# Patient Record
Sex: FEMALE | Race: BLACK OR AFRICAN AMERICAN | Employment: OTHER | URBAN - METROPOLITAN AREA
[De-identification: names, ages, dates, MRNs, and addresses within clinical notes are randomized per-mention and may not be internally consistent; named-entity substitution may affect disease eponyms.]

---

## 2013-04-17 LAB
COLONOSCOPY, EXTERNAL: NORMAL
COLONOSCOPY, EXTERNAL: NORMAL

## 2017-07-10 ENCOUNTER — OFFICE VISIT (OUTPATIENT)
Dept: FAMILY MEDICINE CLINIC | Age: 52
End: 2017-07-10

## 2017-07-10 ENCOUNTER — HOSPITAL ENCOUNTER (OUTPATIENT)
Dept: LAB | Age: 52
Discharge: HOME | End: 2017-07-10
Payer: OTHER GOVERNMENT

## 2017-07-10 VITALS
BODY MASS INDEX: 31.76 KG/M2 | TEMPERATURE: 98.2 F | HEIGHT: 62 IN | HEART RATE: 84 BPM | OXYGEN SATURATION: 97 % | RESPIRATION RATE: 20 BRPM | WEIGHT: 172.6 LBS | DIASTOLIC BLOOD PRESSURE: 110 MMHG | SYSTOLIC BLOOD PRESSURE: 180 MMHG

## 2017-07-10 DIAGNOSIS — I10 ESSENTIAL HYPERTENSION: ICD-10-CM

## 2017-07-10 DIAGNOSIS — K64.9 HEMORRHOIDS, UNSPECIFIED HEMORRHOID TYPE: Primary | ICD-10-CM

## 2017-07-10 DIAGNOSIS — G43.009 NONINTRACTABLE MIGRAINE, UNSPECIFIED MIGRAINE TYPE: ICD-10-CM

## 2017-07-10 DIAGNOSIS — J45.20 MILD INTERMITTENT ASTHMA WITHOUT COMPLICATION: ICD-10-CM

## 2017-07-10 DIAGNOSIS — E78.2 MIXED HYPERLIPIDEMIA: ICD-10-CM

## 2017-07-10 LAB
ALBUMIN SERPL BCP-MCNC: 3.7 G/DL (ref 3.4–5)
ALBUMIN/GLOB SERPL: 1 {RATIO} (ref 0.8–1.7)
ALP SERPL-CCNC: 120 U/L (ref 45–117)
ALT SERPL-CCNC: 67 U/L (ref 13–56)
ANION GAP BLD CALC-SCNC: 10 MMOL/L (ref 3–18)
AST SERPL W P-5'-P-CCNC: 89 U/L (ref 15–37)
BILIRUB SERPL-MCNC: 0.5 MG/DL (ref 0.2–1)
BUN SERPL-MCNC: 7 MG/DL (ref 7–18)
BUN/CREAT SERPL: 10 (ref 12–20)
CALCIUM SERPL-MCNC: 9.3 MG/DL (ref 8.5–10.1)
CHLORIDE SERPL-SCNC: 105 MMOL/L (ref 100–108)
CHOLEST SERPL-MCNC: 315 MG/DL
CO2 SERPL-SCNC: 26 MMOL/L (ref 21–32)
CREAT SERPL-MCNC: 0.67 MG/DL (ref 0.6–1.3)
GLOBULIN SER CALC-MCNC: 3.8 G/DL (ref 2–4)
GLUCOSE SERPL-MCNC: 89 MG/DL (ref 74–99)
HDLC SERPL-MCNC: 64 MG/DL (ref 40–60)
HDLC SERPL: 4.9 {RATIO} (ref 0–5)
LDLC SERPL CALC-MCNC: 222.6 MG/DL (ref 0–100)
LIPID PROFILE,FLP: ABNORMAL
POTASSIUM SERPL-SCNC: 4.1 MMOL/L (ref 3.5–5.5)
PROT SERPL-MCNC: 7.5 G/DL (ref 6.4–8.2)
SODIUM SERPL-SCNC: 141 MMOL/L (ref 136–145)
TRIGL SERPL-MCNC: 142 MG/DL (ref ?–150)
VLDLC SERPL CALC-MCNC: 28.4 MG/DL

## 2017-07-10 PROCEDURE — 80061 LIPID PANEL: CPT | Performed by: NURSE PRACTITIONER

## 2017-07-10 PROCEDURE — 80053 COMPREHEN METABOLIC PANEL: CPT | Performed by: NURSE PRACTITIONER

## 2017-07-10 RX ORDER — LORATADINE 10 MG/1
10 TABLET ORAL DAILY
Qty: 90 TAB | Refills: 3 | Status: SHIPPED | OUTPATIENT
Start: 2017-07-10 | End: 2018-10-26 | Stop reason: ALTCHOICE

## 2017-07-10 RX ORDER — MONTELUKAST SODIUM 10 MG/1
10 TABLET ORAL DAILY
Qty: 90 TAB | Refills: 3 | Status: SHIPPED | OUTPATIENT
Start: 2017-07-10 | End: 2018-03-28 | Stop reason: SDUPTHER

## 2017-07-10 RX ORDER — ALBUTEROL SULFATE 90 UG/1
2 AEROSOL, METERED RESPIRATORY (INHALATION)
Qty: 1 INHALER | Refills: 6 | Status: SHIPPED | OUTPATIENT
Start: 2017-07-10 | End: 2019-04-16 | Stop reason: SDUPTHER

## 2017-07-10 RX ORDER — SUMATRIPTAN 25 MG/1
TABLET, FILM COATED ORAL
Qty: 30 TAB | Refills: 0 | Status: SHIPPED | OUTPATIENT
Start: 2017-07-10 | End: 2019-01-07 | Stop reason: SDUPTHER

## 2017-07-10 RX ORDER — ERYTHROMYCIN 5 MG/G
OINTMENT OPHTHALMIC
COMMUNITY
Start: 2017-04-06 | End: 2017-07-10 | Stop reason: ALTCHOICE

## 2017-07-10 RX ORDER — LISINOPRIL AND HYDROCHLOROTHIAZIDE 20; 25 MG/1; MG/1
1 TABLET ORAL DAILY
Qty: 90 TAB | Refills: 1 | Status: SHIPPED | OUTPATIENT
Start: 2017-07-10 | End: 2018-10-26 | Stop reason: ALTCHOICE

## 2017-07-10 RX ORDER — ATORVASTATIN CALCIUM 40 MG/1
40 TABLET, FILM COATED ORAL DAILY
Qty: 30 TAB | Refills: 3 | Status: SHIPPED | OUTPATIENT
Start: 2017-07-10 | End: 2018-02-22 | Stop reason: ALTCHOICE

## 2017-07-10 RX ORDER — FLUTICASONE PROPIONATE AND SALMETEROL 250; 50 UG/1; UG/1
1 POWDER RESPIRATORY (INHALATION) 2 TIMES DAILY
Qty: 1 INHALER | Refills: 3 | Status: SHIPPED | OUTPATIENT
Start: 2017-07-10 | End: 2018-02-22

## 2017-07-10 NOTE — MR AVS SNAPSHOT
Visit Information Date & Time Provider Department Dept. Phone Encounter #  
 7/10/2017 12:00 PM Camilo Hightower  [facility] [phone] [id number] Upcoming Health Maintenance Date Due Hepatitis C Screening 1965 BREAST CANCER SCRN MAMMOGRAM 3/30/2013 FOBT Q 1 YEAR AGE 50-75 3/30/2013 PAP AKA CERVICAL CYTOLOGY 8/12/2013 Pneumococcal 19-64 Medium Risk (1 of 1 - PPSV23) 7/10/2018* INFLUENZA AGE 9 TO ADULT 8/1/2017 DTaP/Tdap/Td series (2 - Td) 10/7/2020 *Topic was postponed. The date shown is not the original due date. Allergies as of 7/10/2017  Review Complete On: 7/10/2017 By: Camilo Hightower NP Severity Noted Reaction Type Reactions [place] High 07/10/2017    Anaphylaxis Cherry High 07/10/2017    Anaphylaxis Asa-acetaminophen-caff-potass  07/16/2010    Hives Orange Concentrate [Flavoring Agent]  03/19/2014    Nausea and Vomiting Zyrtec [Cetirizine]  04/16/2014    Other (comments) Dizziness, blurred vision and disoriented Current Immunizations  Never Reviewed Name Date Influenza Vaccine 1/9/2013 Influenza Vaccine Split 10/7/2010 PPD 4/18/2012 TDAP Vaccine 10/7/2010 Not reviewed this visit You Were Diagnosed With   
  
 Codes Comments Hemorrhoids, unspecified hemorrhoid type    -  Primary ICD-10-CM: K64.9 ICD-9-CM: 455.6 Mild intermittent asthma without complication     YSP-78-GG: J45.20 ICD-9-CM: 493.90 Mixed hyperlipidemia     ICD-10-CM: E78.2 ICD-9-CM: 272.2 Essential hypertension     ICD-10-CM: I10 
ICD-9-CM: 401.9 Nonintractable migraine, unspecified migraine type     ICD-10-CM: G43.009 ICD-9-CM: 346.10 Vitals BP Pulse Temp Resp Height(growth percentile) Weight(growth percentile) (!) 180/110 (BP 1 Location: Left arm, BP Patient Position: Sitting) 84 98.2 °F (36.8 °C) (Oral) 20 5' 2\" (1.575 m) 172 lb 9.6 oz (78.3 kg) LMP SpO2 BMI OB Status 05/16/2014 97% 31.57 kg/m2 Hysterectomy BMI and BSA Data Body Mass Index Body Surface Area  
 31.57 kg/m 2 1.85 m 2 Preferred Pharmacy Pharmacy Name Phone [facility] [phone] Your Updated Medication List  
  
   
This list is accurate as of: 7/10/17  1:08 PM.  Always use your most recent med list.  
  
  
  
  
 * albuterol 2.5 mg /3 mL (0.083 %) nebulizer solution Commonly known as:  PROVENTIL VENTOLIN  
3 mL by Nebulization route every six (6) hours as needed for Wheezing. * albuterol 2.5 mg /3 mL (0.083 %) nebulizer solution Commonly known as:  PROVENTIL VENTOLIN  
3 mL by Nebulization route every four (4) hours as needed for Wheezing or Shortness of Breath. * albuterol 90 mcg/actuation inhaler Commonly known as:  PROVENTIL HFA, VENTOLIN HFA, PROAIR HFA Take 2 Puffs by inhalation every four (4) hours as needed for Wheezing or Shortness of Breath. atorvastatin 40 mg tablet Commonly known as:  LIPITOR Take 1 Tab by mouth daily. * CLOBEX 0.05 % Spry Generic drug:  clobetasol  
by Apply Externally route. * clobetasol 0.05 % topical cream  
Commonly known as:  Dorita Conception Apply  to affected area two (2) times a day. cloNIDine HCl 0.2 mg tablet Commonly known as:  CATAPRES Take 1 Tab by mouth two (2) times a day. cyclobenzaprine 10 mg tablet Commonly known as:  FLEXERIL Take 1 Tab by mouth three (3) times daily as needed for Muscle Spasm(s). erythromycin ophthalmic ointment Commonly known as:  ILOTYCIN  
  
 fluticasone-salmeterol 250-50 mcg/dose diskus inhaler Commonly known as:  ADVAIR DISKUS Take 1 Puff by inhalation two (2) times a day. hydrocortisone-pramoxine rectal foam  
Commonly known as:  PROCTOFOAM HC Insert 1 Applicator into rectum two (2) times a day. lisinopril-hydroCHLOROthiazide 20-25 mg per tablet Commonly known as:  Adonica Frankel  
 Take 1 Tab by mouth daily. loratadine 10 mg tablet Commonly known as:  Nisha Fleischer Take 1 Tab by mouth daily. montelukast 10 mg tablet Commonly known as:  SINGULAIR Take 1 Tab by mouth daily. Nebulizer & Compressor machine 1 Each by Does Not Apply route every four (4) hours as needed. NEBULIZER Kit Generic drug:  Nebulizer Accessories  
by Does Not Apply route. SUMAtriptan 25 mg tablet Commonly known as:  IMITREX Take 2 tabs by mouth at onset of headache. Then 1 tab by mouth as directed. triamcinolone 0.5 % topical cream  
Commonly known as:  ARISTOCORT Apply  to affected area two (2) times a day. use thin layer * Notice: This list has 5 medication(s) that are the same as other medications prescribed for you. Read the directions carefully, and ask your doctor or other care provider to review them with you. Prescriptions Printed Refills  
 hydrocortisone-pramoxine (PROCTOFOAM HC) rectal foam 0 Sig: Insert 1 Applicator into rectum two (2) times a day. Class: Print Route: Rectal  
 albuterol (PROVENTIL HFA, VENTOLIN HFA, PROAIR HFA) 90 mcg/actuation inhaler 6 Sig: Take 2 Puffs by inhalation every four (4) hours as needed for Wheezing or Shortness of Breath. Class: Print Route: Inhalation  
 atorvastatin (LIPITOR) 40 mg tablet 3 Sig: Take 1 Tab by mouth daily. Class: Print Route: Oral  
 SUMAtriptan (IMITREX) 25 mg tablet 0 Sig: Take 2 tabs by mouth at onset of headache. Then 1 tab by mouth as directed. Class: Print  
 montelukast (SINGULAIR) 10 mg tablet 3 Sig: Take 1 Tab by mouth daily. Class: Print Route: Oral  
 loratadine (CLARITIN) 10 mg tablet 3 Sig: Take 1 Tab by mouth daily. Class: Print Route: Oral  
 lisinopril-hydroCHLOROthiazide (PRINZIDE, ZESTORETIC) 20-25 mg per tablet 1 Sig: Take 1 Tab by mouth daily. Class: Print  Route: Oral  
 fluticasone-salmeterol (ADVAIR DISKUS) 250-50 mcg/dose diskus inhaler 3 Sig: Take 1 Puff by inhalation two (2) times a day. Class: Print Route: Inhalation We Performed the Following REFERRAL TO GENERAL SURGERY [REF27 Custom] Comments:  
 Please evaluate patient for painful external hemorrhoids. Thank you for seeing this patient. [facility] To-Do List   
 07/10/2017 Lab:  LIPID PANEL   
  
 07/10/2017 Lab:  METABOLIC PANEL, COMPREHENSIVE Referral Information Referral ID Referred By Referred To  
  
 [id number] [facility] ARLENE Mckeon [facility], [address] [facility] [place], [address] Phone: [phone] Fax: [phone] Visits Status Start Date End Date 1 Open 7/10/17 7/10/18 If your referral has a status of pending review or denied, additional information will be sent to support the outcome of this decision. Introducing [facility]! [facility] introduces [employer] patient portal. Now you can access parts of your medical record, email your doctor's office, and request medication refills online. 1. In your internet browser, go to [email or web address] 2. Click on the First Time User? Click Here link in the Sign In box. You will see the New Member Sign Up page. 3. Enter your [employer]star Access Code exactly as it appears below. You will not need to use this code after youve completed the sign-up process. If you do not sign up before the expiration date, you must request a new code. · [employer] Access Code: [email or web address] Expires: 10/8/2017 12:55 PM 
 
4. Enter the last four digits of your Social Security Number (xxxx) and Date of Birth (mm/dd/yyyy) as indicated and click Submit. You will be taken to the next sign-up page. 5. Create a Brightstar ID.  This will be your [employer]star login ID and cannot be changed, so think of one that is secure and easy to remember. 6. Create a Hy-Drive password. You can change your password at any time. 7. Enter your Password Reset Question and Answer. This can be used at a later time if you forget your password. 8. Enter your e-mail address. You will receive e-mail notification when new information is available in [address] 9. Click Sign Up. You can now view and download portions of your medical record. 10. Click the Download Summary menu link to download a portable copy of your medical information. If you have questions, please visit the Frequently Asked Questions section of the Hy-[employer] website. Remember, Hy-Drive is NOT to be used for urgent needs. For medical emergencies, dial 911. Now available from your iPhone and Android! Please provide this summary of care documentation to your next provider. Your primary care clinician is listed as oDmenica Marino. If you have any questions after today's visit, please call [phone].

## 2017-07-10 NOTE — PROGRESS NOTES
HISTORY OF PRESENT ILLNESS  Hiram De Oliveira is a 45 y.o. female. Pt presents with a chronic history of hemorrhoids. She has had them since the birth of her son. She has used over the counter treatments that have worked in the past. Nothing has helped this time. They are so painful, she is finding it difficult to walk. Pt also needs refills on all her medicines. Hemorrhoids         Review of Systems   Constitutional: Negative. HENT: Negative. Eyes: Negative. Cardiovascular: Negative. Gastrointestinal: Positive for hemorrhoids. Negative for constipation, diarrhea, nausea and vomiting. Skin: Negative. Visit Vitals    BP (!) 180/110 (BP 1 Location: Left arm, BP Patient Position: Sitting)    Pulse 84    Temp 98.2 °F (36.8 °C) (Oral)    Resp 20    Ht 5' 2\" (1.575 m)    Wt 172 lb 9.6 oz (78.3 kg)    LMP 05/16/2014    SpO2 97%    BMI 31.57 kg/m2       Physical Exam   Constitutional: She appears well-developed. No distress. Abdominal: She exhibits no distension and no mass. There is no tenderness. There is no rebound and no guarding. Rectal:  Pt has two large external nonbleeding hemorrhoids. The one on the left is swollen and very tender to touch. It does not look thrombosed. ASSESSMENT and PLAN    ICD-10-CM ICD-9-CM    1. Hemorrhoids, unspecified hemorrhoid type K64.9 455.6 REFERRAL TO GENERAL SURGERY   2. Mild intermittent asthma without complication S55.81 809.58 albuterol (PROVENTIL HFA, VENTOLIN HFA, PROAIR HFA) 90 mcg/actuation inhaler      montelukast (SINGULAIR) 10 mg tablet      fluticasone-salmeterol (ADVAIR DISKUS) 250-50 mcg/dose diskus inhaler   3. Mixed hyperlipidemia E78.2 272.2 atorvastatin (LIPITOR) 40 mg tablet      LIPID PANEL   4. Essential hypertension I10 401.9 lisinopril-hydroCHLOROthiazide (PRINZIDE, ZESTORETIC) 20-25 mg per tablet      METABOLIC PANEL, COMPREHENSIVE   5.  Nonintractable migraine, unspecified migraine type G43.009 346.10 SUMAtriptan (IMITREX) 25 mg tablet       PLAN:  Pt was given referral but if the pain worsens, she is advised to go to the ED for evaluation. Pt is to monitor her blood pressure. We may need to change medicine or add medicne. Pt was instructed on how to use rectal foam.  Pt was given after care summary.

## 2017-07-12 ENCOUNTER — TELEPHONE (OUTPATIENT)
Dept: FAMILY MEDICINE CLINIC | Age: 52
End: 2017-07-12

## 2017-07-12 NOTE — TELEPHONE ENCOUNTER
Called patient at [phone] (M)  (non-secure line) and did not leave a detailed message. Patient needs to be informed that epi pen is not prescribed by provider and patient should request refill from prescribing provider.

## 2017-07-12 NOTE — TELEPHONE ENCOUNTER
Pt called back and said that she needs her epi pen because it is  and if she goes out to eat or something happens it could be a serious problem. Requesting for Dr. Prasad Quezada or a physician in office to sign off on the refill.

## 2017-07-14 RX ORDER — EPINEPHRINE 0.3 MG/.3ML
0.3 INJECTION SUBCUTANEOUS
Qty: 1 SYRINGE | Refills: 0 | Status: SHIPPED | OUTPATIENT
Start: 2017-07-14 | End: 2018-03-28 | Stop reason: SDUPTHER

## 2017-07-14 NOTE — PROGRESS NOTES
Please advise Pt that her cholesterol is too high. Ask her if she is taking her cholesterol medication. This needs to be rechecked in 3 months fasting. Her LDL is high as well. Advise her to go on-line for diet information on high cholesterol diets. Her liver functions are elevated and this can be because of her cholesterol numbers. Advise her to limit her intake of tylenol, Motrin and Aleve as well as alcohol if she drinks. We will recheck this again in 3 months.

## 2017-07-17 ENCOUNTER — TELEPHONE (OUTPATIENT)
Dept: FAMILY MEDICINE CLINIC | Age: 52
End: 2017-07-17

## 2017-07-17 NOTE — TELEPHONE ENCOUNTER
----- Message from Stacy Coburn LPN sent at 9/68/8235  4:46 PM EDT -----      ----- Message -----     From: Chery Ortiz NP     Sent: 7/14/2017   8:26 AM       To: Mariluz Stovall LPN    Please advise Pt that her cholesterol is too high. Ask her if she is taking her cholesterol medication. This needs to be rechecked in 3 months fasting. Her LDL is high as well. Advise her to go on-line for diet information on high cholesterol diets. Her liver functions are elevated and this can be because of her cholesterol numbers. Advise her to limit her intake of tylenol, Motrin and Aleve as well as alcohol if she drinks. We will recheck this again in 3 months.

## 2017-07-17 NOTE — TELEPHONE ENCOUNTER
----- Message from Robbie Walters LPN sent at 9/16/3159  4:46 PM EDT -----      ----- Message -----     From: Juan Miguel Carlson NP     Sent: 7/14/2017   8:26 AM       To: Sana Duque LPN    Please advise Pt that her cholesterol is too high. Ask her if she is taking her cholesterol medication. This needs to be rechecked in 3 months fasting. Her LDL is high as well. Advise her to go on-line for diet information on high cholesterol diets. Her liver functions are elevated and this can be because of her cholesterol numbers. Advise her to limit her intake of tylenol, Motrin and Aleve as well as alcohol if she drinks. We will recheck this again in 3 months.

## 2018-02-22 ENCOUNTER — OFFICE VISIT (OUTPATIENT)
Dept: FAMILY MEDICINE CLINIC | Age: 53
End: 2018-02-22

## 2018-02-22 VITALS
RESPIRATION RATE: 20 BRPM | SYSTOLIC BLOOD PRESSURE: 100 MMHG | TEMPERATURE: 98.6 F | HEIGHT: 61 IN | OXYGEN SATURATION: 98 % | DIASTOLIC BLOOD PRESSURE: 67 MMHG | WEIGHT: 162.6 LBS | HEART RATE: 70 BPM | BODY MASS INDEX: 30.7 KG/M2

## 2018-02-22 DIAGNOSIS — M25.531 RIGHT WRIST PAIN: ICD-10-CM

## 2018-02-22 DIAGNOSIS — M79.641 RIGHT HAND PAIN: Primary | ICD-10-CM

## 2018-02-22 RX ORDER — BUTALBITAL, ACETAMINOPHEN AND CAFFEINE 50; 325; 40 MG/1; MG/1; MG/1
TABLET ORAL
COMMUNITY
Start: 2018-02-14 | End: 2019-07-03 | Stop reason: SDUPTHER

## 2018-02-22 NOTE — PROGRESS NOTES
HISTORY OF PRESENT ILLNESS  Goyo Tanner is a 45 y.o. female. Patient presents today for hand pain. HPI  Last night at work while moving a refrigerator she injured her hand. She was told she could come her to get an x-ray. Review of Systems   Musculoskeletal: Positive for joint pain (right wrist and hand pain.). Visit Vitals    /67 (BP 1 Location: Right arm, BP Patient Position: Sitting)    Pulse 70    Temp 98.6 °F (37 °C) (Oral)    Resp 20    Ht 5' 1\" (1.549 m)    Wt 162 lb 9.6 oz (73.8 kg)    LMP 05/16/2014    SpO2 98%    BMI 30.72 kg/m2       Physical Exam   Musculoskeletal:        Right wrist: She exhibits decreased range of motion, tenderness and swelling. Left hand: She exhibits decreased range of motion, tenderness and swelling. ASSESSMENT and PLAN    ICD-10-CM ICD-9-CM    1. Right hand pain M79.641 729.5    2. Right wrist pain M25.531 719.43      PLAN  Pt was very upset to learn that we are not set up as yet to do x-rays. Pt states she is going to the [facility] so she can get everything done at once. I did not charge patient for this visit. Pt has an appt tomorrow for migraines.

## 2018-02-22 NOTE — PROGRESS NOTES
1. Have you been to the ER, urgent care clinic since your last visit? Hospitalized since your last visit? Yes When: 02/17 [facility]    2. Have you seen or consulted any other health care providers outside of the [address] [place] [address] since your last visit? Include any pap smears or colon screening.  No

## 2018-02-23 ENCOUNTER — OFFICE VISIT (OUTPATIENT)
Dept: FAMILY MEDICINE CLINIC | Age: 53
End: 2018-02-23

## 2018-02-23 ENCOUNTER — HOSPITAL ENCOUNTER (OUTPATIENT)
Dept: LAB | Age: 53
Discharge: HOME | End: 2018-02-23
Payer: OTHER GOVERNMENT

## 2018-02-23 VITALS
HEART RATE: 78 BPM | OXYGEN SATURATION: 98 % | DIASTOLIC BLOOD PRESSURE: 87 MMHG | RESPIRATION RATE: 20 BRPM | WEIGHT: 164 LBS | SYSTOLIC BLOOD PRESSURE: 154 MMHG | BODY MASS INDEX: 30.18 KG/M2 | HEIGHT: 62 IN | TEMPERATURE: 98.3 F

## 2018-02-23 DIAGNOSIS — J01.00 ACUTE NON-RECURRENT MAXILLARY SINUSITIS: Primary | ICD-10-CM

## 2018-02-23 DIAGNOSIS — G43.009 NONINTRACTABLE MIGRAINE, UNSPECIFIED MIGRAINE TYPE: ICD-10-CM

## 2018-02-23 DIAGNOSIS — I10 ESSENTIAL HYPERTENSION: ICD-10-CM

## 2018-02-23 DIAGNOSIS — Z12.31 ENCOUNTER FOR SCREENING MAMMOGRAM FOR BREAST CANCER: ICD-10-CM

## 2018-02-23 DIAGNOSIS — M79.641 RIGHT HAND PAIN: ICD-10-CM

## 2018-02-23 LAB
ALBUMIN SERPL-MCNC: 3.7 G/DL (ref 3.4–5)
ALBUMIN/GLOB SERPL: 1 {RATIO} (ref 0.8–1.7)
ALP SERPL-CCNC: 128 U/L (ref 45–117)
ALT SERPL-CCNC: 20 U/L (ref 13–56)
ANION GAP SERPL CALC-SCNC: 8 MMOL/L (ref 3–18)
AST SERPL-CCNC: 15 U/L (ref 15–37)
BILIRUB SERPL-MCNC: 0.4 MG/DL (ref 0.2–1)
BUN SERPL-MCNC: 7 MG/DL (ref 7–18)
BUN/CREAT SERPL: 11 (ref 12–20)
CALCIUM SERPL-MCNC: 9 MG/DL (ref 8.5–10.1)
CHLORIDE SERPL-SCNC: 106 MMOL/L (ref 100–108)
CHOLEST SERPL-MCNC: 278 MG/DL
CO2 SERPL-SCNC: 26 MMOL/L (ref 21–32)
CREAT SERPL-MCNC: 0.65 MG/DL (ref 0.6–1.3)
GLOBULIN SER CALC-MCNC: 3.6 G/DL (ref 2–4)
GLUCOSE SERPL-MCNC: 84 MG/DL (ref 74–99)
HDLC SERPL-MCNC: 59 MG/DL (ref 40–60)
HDLC SERPL: 4.7 {RATIO} (ref 0–5)
LDLC SERPL CALC-MCNC: 199.6 MG/DL (ref 0–100)
LIPID PROFILE,FLP: ABNORMAL
POTASSIUM SERPL-SCNC: 4.4 MMOL/L (ref 3.5–5.5)
PROT SERPL-MCNC: 7.3 G/DL (ref 6.4–8.2)
SODIUM SERPL-SCNC: 140 MMOL/L (ref 136–145)
TRIGL SERPL-MCNC: 97 MG/DL (ref ?–150)
VLDLC SERPL CALC-MCNC: 19.4 MG/DL

## 2018-02-23 PROCEDURE — 80061 LIPID PANEL: CPT | Performed by: NURSE PRACTITIONER

## 2018-02-23 PROCEDURE — 80053 COMPREHEN METABOLIC PANEL: CPT | Performed by: NURSE PRACTITIONER

## 2018-02-23 PROCEDURE — 36415 COLL VENOUS BLD VENIPUNCTURE: CPT | Performed by: NURSE PRACTITIONER

## 2018-02-23 RX ORDER — NAPROXEN SODIUM 550 MG/1
550 TABLET ORAL 2 TIMES DAILY WITH MEALS
Qty: 30 TAB | Refills: 1 | Status: SHIPPED | OUTPATIENT
Start: 2018-02-23 | End: 2018-03-28 | Stop reason: SDUPTHER

## 2018-02-23 RX ORDER — CEFUROXIME AXETIL 500 MG/1
500 TABLET ORAL 2 TIMES DAILY
Qty: 20 TAB | Refills: 0 | Status: SHIPPED | OUTPATIENT
Start: 2018-02-23 | End: 2018-03-28 | Stop reason: ALTCHOICE

## 2018-02-23 RX ORDER — HYDROCODONE BITARTRATE AND ACETAMINOPHEN 5; 325 MG/1; MG/1
1 TABLET ORAL
Qty: 30 TAB | Refills: 0 | Status: SHIPPED | OUTPATIENT
Start: 2018-02-23 | End: 2018-03-28 | Stop reason: ALTCHOICE

## 2018-02-23 NOTE — MR AVS SNAPSHOT
[address] [place] [address] 
 
 
 [phone] [address] 
[phone] Patient: Verito Gallagher MRN:  [id number] Visit Information Date & Time Provider Department Dept. Phone Encounter #  
 2/23/2018  8:15 AM Kecia Bonilla NP [facility] [phone] [id number] Upcoming Health Maintenance Date Due Hepatitis C Screening 1965 BREAST CANCER SCRN MAMMOGRAM 3/30/2013 FOBT Q 1 YEAR AGE 50-75 3/30/2013 Influenza Age 5 to Adult 8/1/2017 PAP AKA CERVICAL CYTOLOGY 2/23/2018* Pneumococcal 19-64 Medium Risk (1 of 1 - PPSV23) 7/10/2018* DTaP/Tdap/Td series (2 - Td) 10/7/2020 *Topic was postponed. The date shown is not the original due date. Allergies as of 2/23/2018  Review Complete On: 2/23/2018 By: Kecia Bonilla NP Severity Noted Reaction Type Reactions [place] High 07/10/2017    Anaphylaxis Cherry High 07/10/2017    Anaphylaxis Asa-acetaminophen-caff-potass  07/16/2010    Hives Orange Concentrate [Flavoring Agent]  03/19/2014    Nausea and Vomiting Zyrtec [Cetirizine]  04/16/2014    Other (comments) Dizziness, blurred vision and disoriented Current Immunizations  Never Reviewed Name Date Influenza Vaccine 1/9/2013 Influenza Vaccine Split 10/7/2010 PPD 4/18/2012 TDAP Vaccine 10/7/2010 Not reviewed this visit You Were Diagnosed With   
  
 Codes Comments Acute non-recurrent maxillary sinusitis    -  Primary ICD-10-CM: J01.00 ICD-9-CM: 461.0 Right hand pain     ICD-10-CM: M79.641 ICD-9-CM: 729.5 Nonintractable migraine, unspecified migraine type     ICD-10-CM: G43.009 ICD-9-CM: 346.10 Vitals BP Pulse Temp Resp Height(growth percentile) Weight(growth percentile) 154/87 (BP 1 Location: Right arm, BP Patient Position: Sitting) 78 98.3 °F (36.8 °C) (Oral) 20 5' 2\" (1.575 m) 164 lb (74.4 kg) LMP SpO2 BMI OB Status Smoking Status 05/16/2014 98% 30 kg/m2 Hysterectomy Never Smoker BMI and BSA Data Body Mass Index Body Surface Area  
 30 kg/m 2 1.8 m 2 Preferred Pharmacy Pharmacy Name Phone [address] [facility] [phone] Your Updated Medication List  
  
   
This list is accurate as of 2/23/18  9:02 AM.  Always use your most recent med list.  
  
  
  
  
 * albuterol 2.5 mg /3 mL (0.083 %) nebulizer solution Commonly known as:  PROVENTIL VENTOLIN  
3 mL by Nebulization route every four (4) hours as needed for Wheezing or Shortness of Breath. * albuterol 90 mcg/actuation inhaler Commonly known as:  PROVENTIL HFA, VENTOLIN HFA, PROAIR HFA Take 2 Puffs by inhalation every four (4) hours as needed for Wheezing or Shortness of Breath. butalbital-acetaminophen-caffeine -40 mg per tablet Commonly known as:  FIORICET, ESGIC  
  
 cefUROXime 500 mg tablet Commonly known as:  CEFTIN Take 1 Tab by mouth two (2) times a day. EPINEPHrine 0.3 mg/0.3 mL injection Commonly known as:  EPIPEN  
0.3 mL by IntraMUSCular route once as needed for up to 1 dose. HYDROcodone-acetaminophen 5-325 mg per tablet Commonly known as:  Jackelyn Centers Take 1 Tab by mouth every six (6) hours as needed for Pain. Max Daily Amount: 4 Tabs. hydrocortisone-pramoxine rectal foam  
Commonly known as:  PROCTOFOAM HC Insert 1 Applicator into rectum two (2) times a day. lisinopril-hydroCHLOROthiazide 20-25 mg per tablet Commonly known as:  Princess Scrape Take 1 Tab by mouth daily. loratadine 10 mg tablet Commonly known as:  Ashish Krystian Take 1 Tab by mouth daily. montelukast 10 mg tablet Commonly known as:  SINGULAIR Take 1 Tab by mouth daily. naproxen sodium 550 mg tablet Commonly known as:  Leonard Rowleyo DS Take 1 Tab by mouth two (2) times daily (with meals). Nebulizer & Compressor machine 1 Each by Does Not Apply route every four (4) hours as needed. NEBULIZER Kit Generic drug:  Nebulizer Accessories  
by Does Not Apply route. SUMAtriptan 25 mg tablet Commonly known as:  IMITREX Take 2 tabs by mouth at onset of headache. Then 1 tab by mouth as directed. triamcinolone 0.5 % topical cream  
Commonly known as:  ARISTOCORT Apply  to affected area two (2) times a day. use thin layer * Notice: This list has 2 medication(s) that are the same as other medications prescribed for you. Read the directions carefully, and ask your doctor or other care provider to review them with you. Prescriptions Printed Refills  
 naproxen sodium (ANAPROX DS) 550 mg tablet 1 Sig: Take 1 Tab by mouth two (2) times daily (with meals). Class: Print Route: Oral  
 HYDROcodone-acetaminophen (NORCO) 5-325 mg per tablet 0 Sig: Take 1 Tab by mouth every six (6) hours as needed for Pain. Max Daily Amount: 4 Tabs. Class: Print Route: Oral  
 cefUROXime (CEFTIN) 500 mg tablet 0 Sig: Take 1 Tab by mouth two (2) times a day. Class: Print Route: Oral  
  
Patient Instructions Migraine/headaches Propranolol 50mg once a day: does not need weening Topamax is increased weekly until headache free then needs to be taper down to ween off. Introducing [facility]! Yi Thurman introduces [employer] patient portal. Now you can access parts of your medical record, email your doctor's office, and request medication refills online. 1. In your internet browser, go to [email or web address] 2. Click on the First Time User? Click Here link in the Sign In box. You will see the New Member Sign Up page. 3. Enter your [employer] Access Code exactly as it appears below. You will not need to use this code after youve completed the sign-up process. If you do not sign up before the expiration date, you must request a new code. · X[employer] Access Code: [email or web address] Expires: 5/24/2018  8:57 AM 
 
4. Enter the last four digits of your Social Security Number (xxxx) and Date of Birth (mm/dd/yyyy) as indicated and click Submit. You will be taken to the next sign-up page. 5. Create a Xeko ID. This will be your Xeko login ID and cannot be changed, so think of one that is secure and easy to remember. 6. Create a Xeko password. You can change your password at any time. 7. Enter your Password Reset Question and Answer. This can be used at a later time if you forget your password. 8. Enter your e-mail address. You will receive e-mail notification when new information is available in [address] 9. Click Sign Up. You can now view and download portions of your medical record. 10. Click the Download Summary menu link to download a portable copy of your medical information. If you have questions, please visit the Frequently Asked Questions section of the [employer] website. Remember, [employer] is NOT to be used for urgent needs. For medical emergencies, dial 911. Now available from your iPhone and Android! Please provide this summary of care documentation to your next provider. Your primary care clinician is listed as Severiano Reardon. If you have any questions after today's visit, please call [phone].

## 2018-02-23 NOTE — PROGRESS NOTES
1. Have you been to the ER, urgent care clinic since your last visit? Hospitalized since your last visit? No    2. Have you seen or consulted any other health care providers outside of the [address] [place] [address] since your last visit? Include any pap smears or colon screening.  No

## 2018-02-23 NOTE — PATIENT INSTRUCTIONS
Migraine/headaches    Propranolol 50mg once a day: does not need weening    Topamax is increased weekly until headache free then needs to be taper down to ween off.

## 2018-02-23 NOTE — PROGRESS NOTES
HISTORY OF PRESENT ILLNESS  Reta Lloyd is a 45 y.o. female. Patient presents today for issues with migraines. HPI  Pt seen in ED at [place] for her hand yesterday. She was given a splint and tylenol. For her headache she has been recently given Fioricet  During an ED visit. Review of Systems   Constitutional: Negative. HENT: Negative. Eyes: Negative. Respiratory: Negative. Cardiovascular: Negative. Musculoskeletal: Positive for joint pain (right hand and right wrist pain). Neurological: Positive for headaches. Visit Vitals    /87 (BP 1 Location: Right arm, BP Patient Position: Sitting)    Pulse 78    Temp 98.3 °F (36.8 °C) (Oral)    Resp 20    Ht 5' 2\" (1.575 m)    Wt 164 lb (74.4 kg)    LMP 05/16/2014    SpO2 98%    BMI 30 kg/m2       Physical Exam   Constitutional: She is oriented to person, place, and time. She appears well-developed. No distress. HENT:   Head: Normocephalic and atraumatic. Right Ear: Tympanic membrane is injected and retracted. A middle ear effusion (right greater than left) is present. Left Ear: A middle ear effusion is present. Nose: Mucosal edema present. Mouth/Throat: Oropharynx is clear and moist.   Eyes: Conjunctivae and EOM are normal. Pupils are equal, round, and reactive to light. Right eye exhibits no discharge. Left eye exhibits no discharge. Neck: Normal range of motion. Neck supple. Cardiovascular: Normal rate and regular rhythm. Murmur heard. Pulmonary/Chest: Effort normal and breath sounds normal. No respiratory distress. She has no wheezes. She has no rales. Musculoskeletal: She exhibits no edema. Right wrist: She exhibits decreased range of motion, tenderness and swelling. Right hand: She exhibits decreased range of motion, tenderness and swelling. Neurological: She is alert and oriented to person, place, and time. No cranial nerve deficit.        ASSESSMENT and PLAN    ICD-10-CM ICD-9-CM 1. Acute non-recurrent maxillary sinusitis J01.00 461.0 cefUROXime (CEFTIN) 500 mg tablet   2. Right hand pain M79.641 729.5 naproxen sodium (ANAPROX DS) 550 mg tablet      HYDROcodone-acetaminophen (NORCO) 5-325 mg per tablet   3. Nonintractable migraine, unspecified migraine type G43.009 346.10      PLAN:  We discussed her ED visit yesterday. Pt advised to continue use of splint. We discussed her concerns about too much tylenol and those kind of meds. Since she stopped drinking a year ago, she is concerned about her liver functions. Since stop drinking her blood pressure has been controlled without medications. We discussed her migraines. If Migraine persist after finishing antibiotic, we can try a daily medication such as Propranolol or Topamax. We discussed the side effects of those.     Pt was given after visit summary

## 2018-02-26 NOTE — PROGRESS NOTES
Please advise Pt that Her kidney and liver functions are stable. Her cholesterol numbers are improved but area still elevated. Encourage her to continue working on diet and exercise. We will recheck these fasting in 6 months.

## 2018-02-28 ENCOUNTER — TELEPHONE (OUTPATIENT)
Dept: FAMILY MEDICINE CLINIC | Age: 53
End: 2018-02-28

## 2018-02-28 NOTE — TELEPHONE ENCOUNTER
----- Message from Frank Vaca NP sent at 2/26/2018  8:15 AM EST -----  Please advise Pt that Her kidney and liver functions are stable. Her cholesterol numbers are improved but area still elevated. Encourage her to continue working on diet and exercise. We will recheck these fasting in 6 months.

## 2018-02-28 NOTE — LETTER
2/28/2018 1:41 PM 
 
Ms. Carly Jamil [address] [facility] [address] We are writing to inform you that your kidney and liver functions are stable. Her cholesterol numbers are improved but area still elevated. Please continue working on diet and exercise. We will recheck these fasting in 6 months Sincerely, Emilia Alejandra LPN

## 2018-03-01 ENCOUNTER — TELEPHONE (OUTPATIENT)
Dept: FAMILY MEDICINE CLINIC | Age: 53
End: 2018-03-01

## 2018-03-01 RX ORDER — AZITHROMYCIN 250 MG/1
TABLET, FILM COATED ORAL
Qty: 6 TAB | Refills: 0 | Status: SHIPPED | OUTPATIENT
Start: 2018-03-01 | End: 2018-03-05 | Stop reason: SDUPTHER

## 2018-03-01 NOTE — TELEPHONE ENCOUNTER
Pt states she is having an reaction to the antibiotics that were prescribed to her pt is having blistering lips she has taken benadryl for it she wants another antibiotic.

## 2018-03-01 NOTE — TELEPHONE ENCOUNTER
Left message for patient to return call to the office.    Provider would patient to stop antibiotic and Zpack sent to the pharmacy

## 2018-03-05 ENCOUNTER — HOSPITAL ENCOUNTER (OUTPATIENT)
Dept: MAMMOGRAPHY | Age: 53
Discharge: HOME | End: 2018-03-05
Attending: NURSE PRACTITIONER
Payer: OTHER GOVERNMENT

## 2018-03-05 DIAGNOSIS — Z12.31 ENCOUNTER FOR SCREENING MAMMOGRAM FOR BREAST CANCER: ICD-10-CM

## 2018-03-05 PROCEDURE — 77067 SCR MAMMO BI INCL CAD: CPT

## 2018-03-05 RX ORDER — AZITHROMYCIN 250 MG/1
TABLET, FILM COATED ORAL
Qty: 6 TAB | Refills: 0 | Status: SHIPPED | OUTPATIENT
Start: 2018-03-05 | End: 2018-03-10

## 2018-03-05 NOTE — TELEPHONE ENCOUNTER
Patient returned phone call and was given message. Per patient she wants to know if she should stop by the office to  script. Patient can be reached at [phone]. Please advise.

## 2018-03-28 ENCOUNTER — OFFICE VISIT (OUTPATIENT)
Dept: FAMILY MEDICINE CLINIC | Age: 53
End: 2018-03-28

## 2018-03-28 VITALS
TEMPERATURE: 99.2 F | OXYGEN SATURATION: 99 % | BODY MASS INDEX: 30.95 KG/M2 | SYSTOLIC BLOOD PRESSURE: 118 MMHG | RESPIRATION RATE: 18 BRPM | WEIGHT: 168.2 LBS | HEART RATE: 87 BPM | DIASTOLIC BLOOD PRESSURE: 73 MMHG | HEIGHT: 62 IN

## 2018-03-28 DIAGNOSIS — J45.20 MILD INTERMITTENT ASTHMA WITHOUT COMPLICATION: ICD-10-CM

## 2018-03-28 DIAGNOSIS — M65.4 DE QUERVAIN'S TENOSYNOVITIS, RIGHT: Primary | ICD-10-CM

## 2018-03-28 DIAGNOSIS — Z86.69 HISTORY OF MIGRAINE HEADACHES: ICD-10-CM

## 2018-03-28 DIAGNOSIS — M79.641 RIGHT HAND PAIN: ICD-10-CM

## 2018-03-28 RX ORDER — EPINEPHRINE 0.3 MG/.3ML
0.3 INJECTION SUBCUTANEOUS
Qty: 1 SYRINGE | Refills: 0 | Status: SHIPPED | OUTPATIENT
Start: 2018-03-28 | End: 2019-04-16 | Stop reason: SDUPTHER

## 2018-03-28 RX ORDER — MONTELUKAST SODIUM 10 MG/1
10 TABLET ORAL DAILY
Qty: 90 TAB | Refills: 3 | Status: SHIPPED | OUTPATIENT
Start: 2018-03-28 | End: 2018-10-26 | Stop reason: ALTCHOICE

## 2018-03-28 RX ORDER — NAPROXEN SODIUM 550 MG/1
550 TABLET ORAL 2 TIMES DAILY WITH MEALS
Qty: 30 TAB | Refills: 1 | Status: SHIPPED | OUTPATIENT
Start: 2018-03-28 | End: 2018-04-27

## 2018-03-28 NOTE — PATIENT INSTRUCTIONS
Rocco Ax Tenosynovitis: Care Instructions  Your Care Instructions  Celestinoa Ax (say \"Eunice\") tenosynovitis is a problem that makes the bottom of your thumb and the side of your wrist hurt. When you have de Quervain's, the ropey fiber (tendon) that helps move your thumb away from your fingers becomes swollen. You may have pain when you move your wrist or pick things up. You may hear a creaking sound when you move your wrist or thumb. Symptoms often get better in a few weeks with home care. Your doctor may want you to start some gentle stretching exercises once your symptoms are gone. Sometimes treatment with an injection or surgery is needed. Follow-up care is a key part of your treatment and safety. Be sure to make and go to all appointments, and call your doctor if you are having problems. It's also a good idea to know your test results and keep a list of the medicines you take. How can you care for yourself at home? · Until your symptoms are better, stop the activities that caused the pain. · Avoid moving the hand and wrist that hurt. · Follow your doctor's directions for wearing a splint to keep your thumb and wrist from moving. · Try ice or heat. ¨ Put ice or a cold pack on your thumb and wrist for 10 to 20 minutes at a time. Put a thin cloth between the ice and your skin. ¨ You can use heat for 20 to 30 minutes, 2 or 3 times a day. Try using a heating pad, hot shower, or hot pack. · Ask your doctor if you can take an over-the-counter pain medicine, such as acetaminophen (Tylenol), ibuprofen (Advil, Motrin), or naproxen (Aleve). Be safe with medicines. Read and follow all instructions on the label. When should you call for help? Watch closely for changes in your health, and be sure to contact your doctor if:  ? · You have new or worse pain. ? · You have new or worse numbness or tingling in your hand or fingers. ? · Your hand feels weaker.    ? · You do not get better as expected. Where can you learn more? Go to http://digna-markel.info/. Enter N162 in the search box to learn more about \"De Quervain's Tenosynovitis: Care Instructions. \"  Current as of: March 21, 2017  Content Version: 11.4  © 2961-3631 [employer]. Care instructions adapted under license by [employer] (which disclaims liability or warranty for this information). If you have questions about a medical condition or this instruction, always ask your healthcare professional. [address] any warranty or liability for your use of this information. Marie Arabia Disease: Exercises  Your Care Instructions  Here are some examples of typical rehabilitation exercises for your condition. Start each exercise slowly. Ease off the exercise if you start to have pain. Your doctor or your physical or occupational therapist will tell you when you can start these exercises and which ones will work best for you. How to do the exercises  Thumb lifts    1. Place your hand on a flat surface, with your palm up. 2. Lift your thumb away from your palm to make a \"C\" shape. 3. Hold for about 6 seconds. 4. Repeat 8 to 12 times. Passive thumb MP flexion    1. Hold your hand in front of you, and turn your hand so your little finger faces down and your thumb faces up. (Your hand should be in the position used for shaking someone's hand.) You may also rest your hand on a flat surface. 2. Use the fingers on your other hand to bend your thumb down at the point where your thumb connects to your palm. 3. Hold for at least 15 to 30 seconds. 4. Repeat 2 to 4 times. Finkelstein stretch    1. Hold your arms out in front of you. (Your hand should be in the position used for shaking someone's hand.)  2. Bend your thumb toward your palm.   3. Use your other hand to gently stretch your thumb and wrist downward until you feel the stretch on the thumb side of your wrist.  4. Hold for at least 15 to 30 seconds. 5. Repeat 2 to 4 times. Resisted ulnar deviation    For this exercise, you will need elastic exercise material, such as surgical tubing or Thera-Band. 1. Sit leaning forward with your legs slightly spread and your elbow on your thigh. 2. Grasp one end of the band with your palm down, and step on the other end with the foot opposite the hand holding the band. 3. Slowly bend your wrist sideways and away from your knee. 4. Repeat 8 to 12 times. Follow-up care is a key part of your treatment and safety. Be sure to make and go to all appointments, and call your doctor if you are having problems. It's also a good idea to know your test results and keep a list of the medicines you take. Where can you learn more? Go to http://digna-markel.info/. Enter G450 in the search box to learn more about \"De Quervain's Disease: Exercises. \"  Current as of: March 21, 2017  Content Version: 11.4  © 2145-9666 [employer]wise[employer] Incorporated. Care instructions adapted under license by [employer] (which disclaims liability or warranty for this information). If you have questions about a medical condition or this instruction, always ask your healthcare professional. [address] any warranty or liability for your use of this information.

## 2018-03-28 NOTE — PROGRESS NOTES
Chief Complaint   Patient presents with   Werner Dial     right     Patient is here today for right arm sprain x 1wk. Pt sts it is stuff and painful at night. 1. Have you been to the ER, urgent care clinic since your last visit? Hospitalized since your last visit? No    2. Have you seen or consulted any other health care providers outside of the [facility] since your last visit? Include any pap smears or colon screening.  No

## 2018-03-28 NOTE — MR AVS SNAPSHOT
[facility] 
 
 
 [address] [address] [address] [address] 
[phone] Patient: Jeanette Landa MRN:  [id number] Visit Information Date & Time Provider Department Dept. Phone Encounter #  
 3/28/2018  1:30 PM Mira Ramirez, [address] [place] [address] Primary Care [phone] [id number] Follow-up Instructions Return if symptoms worsen or fail to improve. Upcoming Health Maintenance Date Due Hepatitis C Screening 1965 FOBT Q 1 YEAR AGE 50-75 3/30/2013 PAP AKA CERVICAL CYTOLOGY 8/12/2013 Pneumococcal 19-64 Medium Risk (1 of 1 - PPSV23) 7/10/2018* Influenza Age 5 to Adult 10/16/2018* BREAST CANCER SCRN MAMMOGRAM 3/5/2020 DTaP/Tdap/Td series (2 - Td) 10/7/2020 *Topic was postponed. The date shown is not the original due date. Allergies as of 3/28/2018  Review Complete On: 3/28/2018 By: Linh Escobedo LPN Severity Noted Reaction Type Reactions Ord High 07/10/2017    Anaphylaxis Cherry High 07/10/2017    Anaphylaxis Asa-acetaminophen-caff-potass  07/16/2010    Hives Orange Concentrate [Flavoring Agent]  03/19/2014    Nausea and Vomiting Zyrtec [Cetirizine]  04/16/2014    Other (comments) Dizziness, blurred vision and disoriented Current Immunizations  Never Reviewed Name Date Influenza Vaccine 1/9/2013 Influenza Vaccine Split 10/7/2010 PPD 4/18/2012 TDAP Vaccine 10/7/2010 Not reviewed this visit You Were Diagnosed With   
  
 Codes Comments Right wrist tendonitis    -  Primary ICD-10-CM: M77.8 ICD-9-CM: 727.05 Right hand pain     ICD-10-CM: M79.641 ICD-9-CM: 729.5 Mild intermittent asthma without complication     JQW-[id number]: J45.20 ICD-9-CM: 493.90 History of migraine headaches     ICD-10-CM: Z86.69 
ICD-9-CM: V12.49 Vitals BP Pulse Temp Resp Height(growth percentile) Weight(growth percentile) 118/73 (BP 1 Location: Right arm, BP Patient Position: Sitting) 87 99.2 °F (37.3 °C) (Oral) 18 5' 2\" (1.575 m) 168 lb 3.2 oz (76.3 kg) LMP SpO2 BMI OB Status Smoking Status 05/16/2014 99% 30.76 kg/m2 Hysterectomy Never Smoker Vitals History BMI and BSA Data Body Mass Index Body Surface Area 30.76 kg/m 2 1.83 m 2 Preferred Pharmacy Pharmacy Name Phone [address] [facility] [phone] Your Updated Medication List  
  
   
This list is accurate as of 3/28/18  2:01 PM.  Always use your most recent med list.  
  
  
  
  
 * albuterol 2.5 mg /3 mL (0.083 %) nebulizer solution Commonly known as:  PROVENTIL VENTOLIN  
3 mL by Nebulization route every four (4) hours as needed for Wheezing or Shortness of Breath. * albuterol 90 mcg/actuation inhaler Commonly known as:  PROVENTIL HFA, VENTOLIN HFA, PROAIR HFA Take 2 Puffs by inhalation every four (4) hours as needed for Wheezing or Shortness of Breath. butalbital-acetaminophen-caffeine -40 mg per tablet Commonly known as:  FIORICET, ESGIC  
  
 cefUROXime 500 mg tablet Commonly known as:  CEFTIN Take 1 Tab by mouth two (2) times a day. EPINEPHrine 0.3 mg/0.3 mL injection Commonly known as:  EPIPEN  
0.3 mL by IntraMUSCular route once as needed for up to 1 dose. HYDROcodone-acetaminophen 5-325 mg per tablet Commonly known as:  Billye [place] Take 1 Tab by mouth every six (6) hours as needed for Pain. Max Daily Amount: 4 Tabs. lisinopril-hydroCHLOROthiazide 20-25 mg per tablet Commonly known as:  Ginger Kirks Take 1 Tab by mouth daily. loratadine 10 mg tablet Commonly known as:  Phyliss Jeremiah Take 1 Tab by mouth daily. montelukast 10 mg tablet Commonly known as:  SINGULAIR Take 1 Tab by mouth daily. naproxen sodium 550 mg tablet Commonly known as:  Aloha Pillion DS Take 1 Tab by mouth two (2) times daily (with meals). Nebulizer & Compressor machine 1 Each by Does Not Apply route every four (4) hours as needed. NEBULIZER Kit Generic drug:  Nebulizer Accessories  
by Does Not Apply route. SUMAtriptan 25 mg tablet Commonly known as:  IMITREX Take 2 tabs by mouth at onset of headache. Then 1 tab by mouth as directed. triamcinolone 0.5 % topical cream  
Commonly known as:  ARISTOCORT Apply  to affected area two (2) times a day. use thin layer * Notice: This list has 2 medication(s) that are the same as other medications prescribed for you. Read the directions carefully, and ask your doctor or other care provider to review them with you. Follow-up Instructions Return if symptoms worsen or fail to improve. Patient Instructions Ki Martinez Tenosynovitis: Care Instructions Your Care Instructions Ki Martinez (say \"jdj-wkcn-RIE\") tenosynovitis is a problem that makes the bottom of your thumb and the side of your wrist hurt. When you have de Quervain's, the ropey fiber (tendon) that helps move your thumb away from your fingers becomes swollen. You may have pain when you move your wrist or pick things up. You may hear a creaking sound when you move your wrist or thumb. Symptoms often get better in a few weeks with home care. Your doctor may want you to start some gentle stretching exercises once your symptoms are gone. Sometimes treatment with an injection or surgery is needed. Follow-up care is a key part of your treatment and safety. Be sure to make and go to all appointments, and call your doctor if you are having problems. It's also a good idea to know your test results and keep a list of the medicines you take. How can you care for yourself at home? · Until your symptoms are better, stop the activities that caused the pain. · Avoid moving the hand and wrist that hurt.  
· Follow your doctor's directions for wearing a splint to keep your thumb and wrist from moving. · Try ice or heat. ¨ Put ice or a cold pack on your thumb and wrist for 10 to 20 minutes at a time. Put a thin cloth between the ice and your skin. ¨ You can use heat for 20 to 30 minutes, 2 or 3 times a day. Try using a heating pad, hot shower, or hot pack. · Ask your doctor if you can take an over-the-counter pain medicine, such as acetaminophen (Tylenol), ibuprofen (Advil, Motrin), or naproxen (Aleve). Be safe with medicines. Read and follow all instructions on the label. When should you call for help? Watch closely for changes in your health, and be sure to contact your doctor if: 
? · You have new or worse pain. ? · You have new or worse numbness or tingling in your hand or fingers. ? · Your hand feels weaker. ? · You do not get better as expected. Where can you learn more? Go to http://digna-markel.info/. Enter H026 in the search box to learn more about \"De Quervain's Tenosynovitis: Care Instructions. \" Current as of: March 21, 2017 Content Version: 11.4 © 2975-7509 [employer]. Care instructions adapted under license by [employer] (which disclaims liability or warranty for this information). If you have questions about a medical condition or this instruction, always ask your healthcare professional. [address] [address] any warranty or liability for your use of this information. Chriss Geller Disease: Exercises Your Care Instructions Here are some examples of typical rehabilitation exercises for your condition. Start each exercise slowly. Ease off the exercise if you start to have pain. Your doctor or your physical or occupational therapist will tell you when you can start these exercises and which ones will work best for you. How to do the exercises Thumb lifts 1. Place your hand on a flat surface, with your palm up. 2. Lift your thumb away from your palm to make a \"C\" shape. 3. Hold for about 6 seconds. 4. Repeat 8 to 12 times. Passive thumb MP flexion 1. Hold your hand in front of you, and turn your hand so your little finger faces down and your thumb faces up. (Your hand should be in the position used for shaking someone's hand.) You may also rest your hand on a flat surface. 2. Use the fingers on your other hand to bend your thumb down at the point where your thumb connects to your palm. 3. Hold for at least 15 to 30 seconds. 4. Repeat 2 to 4 times. Finkelstein stretch 1. Hold your arms out in front of you. (Your hand should be in the position used for shaking someone's hand.) 2. Bend your thumb toward your palm. 3. Use your other hand to gently stretch your thumb and wrist downward until you feel the stretch on the thumb side of your wrist. 
4. Hold for at least 15 to 30 seconds. 5. Repeat 2 to 4 times. Resisted ulnar deviation For this exercise, you will need elastic exercise material, such as surgical tubing or Thera-Band. 1. Sit leaning forward with your legs slightly spread and your elbow on your thigh. 2. Grasp one end of the band with your palm down, and step on the other end with the foot opposite the hand holding the band. 3. Slowly bend your wrist sideways and away from your knee. 4. Repeat 8 to 12 times. Follow-up care is a key part of your treatment and safety. Be sure to make and go to all appointments, and call your doctor if you are having problems. It's also a good idea to know your test results and keep a list of the medicines you take. Where can you learn more? Go to http://digna-markel.info/. Enter Z638 in the search box to learn more about \"De Quervain's Disease: Exercises. \" Current as of: March 21, 2017 Content Version: 11.4 © 3638-5279 Healthwise, Incorporated.  Care instructions adapted under license by [employer] (which disclaims liability or warranty for this information). If you have questions about a medical condition or this instruction, always ask your healthcare professional. [address] any warranty or liability for your use of this information. Introducing hospitals HEALTH SERVICES! [facility] introduces Medallia patient portal. Now you can access parts of your medical record, email your doctor's office, and request medication refills online. 1. In your internet browser, go to [email or web address] 2. Click on the First Time User? Click Here link in the Sign In box. You will see the New Member Sign Up page. 3. Enter your Medallia Access Code exactly as it appears below. You will not need to use this code after youve completed the sign-up process. If you do not sign up before the expiration date, you must request a new code. · Medallia Access Code: [email or web address] Expires: 5/24/2018  9:57 AM 
 
4. Enter the last four digits of your Social Security Number (xxxx) and Date of Birth (mm/dd/yyyy) as indicated and click Submit. You will be taken to the next sign-up page. 5. Create a Medallia ID. This will be your Medallia login ID and cannot be changed, so think of one that is secure and easy to remember. 6. Create a Medallia password. You can change your password at any time. 7. Enter your Password Reset Question and Answer. This can be used at a later time if you forget your password. 8. Enter your e-mail address. You will receive e-mail notification when new information is available in [address] 9. Click Sign Up. You can now view and download portions of your medical record. 10. Click the Download Summary menu link to download a portable copy of your medical information. If you have questions, please visit the Frequently Asked Questions section of the Medallia website. Remember, Medallia is NOT to be used for urgent needs. For medical emergencies, dial 911. Now available from your iPhone and Android! Please provide this summary of care documentation to your next provider. Your primary care clinician is listed as Aysha Thomas. If you have any questions after today's visit, please call [phone].

## 2018-03-28 NOTE — PROGRESS NOTES
HISTORY OF PRESENT ILLNESS  Fawn Dotson is a 45 y.o. female. HPI Comments: States she has right wrist sprain. Comments she hears a crunching noise when she moves her wrist.  Reports during the day she will wear a brace with improvement. Reports if she bends it a certain way she has increased pain. Migraines: reports headaches have worsened. Reports she was seen in the ED x3 in 1 month. Reports she takes Fioricet for her headaches that she was prescribed by the ED without improvement. Comments she was previously on Imitrex but after a while it stopped working. Comments she would like to be referred to a neurologist.  Reports when head is present she has vomiting, will have a copper taste in her mouth. Allergies   Allergen Reactions    [place] Anaphylaxis    Cherry Anaphylaxis    Asa-Acetaminophen-Caff-Potass Hives    Orange Concentrate [Flavoring Agent] Nausea and Vomiting    Zyrtec [Cetirizine] Other (comments)     Dizziness, blurred vision and disoriented     Current Outpatient Prescriptions   Medication Sig Dispense Refill    EPINEPHrine (EPIPEN) 0.3 mg/0.3 mL injection 0.3 mL by IntraMUSCular route once as needed for up to 1 dose. 1 Syringe 0    naproxen sodium (ANAPROX DS) 550 mg tablet Take 1 Tab by mouth two (2) times daily (with meals). 30 Tab 1    montelukast (SINGULAIR) 10 mg tablet Take 1 Tab by mouth daily. 90 Tab 3    albuterol (PROVENTIL HFA, VENTOLIN HFA, PROAIR HFA) 90 mcg/actuation inhaler Take 2 Puffs by inhalation every four (4) hours as needed for Wheezing or Shortness of Breath. 1 Inhaler 6    loratadine (CLARITIN) 10 mg tablet Take 1 Tab by mouth daily. 90 Tab 3    lisinopril-hydroCHLOROthiazide (PRINZIDE, ZESTORETIC) 20-25 mg per tablet Take 1 Tab by mouth daily. 90 Tab 1    triamcinolone (ARISTOCORT) 0.5 % topical cream Apply  to affected area two (2) times a day.  use thin layer 30 g 0    albuterol (PROVENTIL VENTOLIN) 2.5 mg /3 mL (0.083 %) nebulizer solution 3 mL by Nebulization route every four (4) hours as needed for Wheezing or Shortness of Breath. 2 Package 3    Nebulizer & Compressor machine 1 Each by Does Not Apply route every four (4) hours as needed. 1 Each 0    Nebulizer Accessories (NEBULIZER) Kit by Does Not Apply route.  butalbital-acetaminophen-caffeine (FIORICET, ESGIC) -40 mg per tablet       SUMAtriptan (IMITREX) 25 mg tablet Take 2 tabs by mouth at onset of headache. Then 1 tab by mouth as directed. 27 Tab 0     Past Medical History:   Diagnosis Date    Allergic rhinitis 7/16/2010    Asthma     Chronic shoulder pain 7/16/2010    HTN (hypertension) 7/16/2010    Hyperlipemia 1/84/1046    Lichen planus 1/4/2162    Obesity 7/16/2010     Review of Systems   Constitutional: Negative for chills and fever. Eyes: Negative for photophobia. Gastrointestinal: Negative for nausea and vomiting. Musculoskeletal: Positive for joint pain (right hand pain ). Neurological: Negative for dizziness. Visit Vitals    /73 (BP 1 Location: Right arm, BP Patient Position: Sitting)    Pulse 87    Temp 99.2 °F (37.3 °C) (Oral)    Resp 18    Ht 5' 2\" (1.575 m)    Wt 168 lb 3.2 oz (76.3 kg)    LMP 05/16/2014    SpO2 99%    BMI 30.76 kg/m2     Physical Exam   Constitutional: She appears well-developed and well-nourished. No distress. Cardiovascular: Normal rate, regular rhythm and normal heart sounds. Exam reveals no gallop and no friction rub. No murmur heard. Pulmonary/Chest: Effort normal and breath sounds normal. She has no wheezes. She has no rhonchi. She has no rales. Musculoskeletal:        Right wrist: She exhibits tenderness (medial wrist, positive Finklestein test ). ASSESSMENT and PLAN  Diagnoses and all orders for this visit:    1. De Quervain's tenosynovitis, right  -     REFERRAL TO ORTHOPEDICS    2. Right hand pain  -     naproxen sodium (ANAPROX DS) 550 mg tablet;  Take 1 Tab by mouth two (2) times daily (with meals). 3. Mild intermittent asthma without complication  -     montelukast (SINGULAIR) 10 mg tablet; Take 1 Tab by mouth daily. 4. History of migraine headaches  -     REFERRAL TO NEUROLOGY    Other orders  -     EPINEPHrine (EPIPEN) 0.3 mg/0.3 mL injection; 0.3 mL by IntraMUSCular route once as needed for up to 1 dose. stretches for wrist tendonitis provided and reviewed  I have discussed the diagnosis with the patient and the intended plan as seen in the above orders. The patient has received an after-visit summary and questions were answered concerning future plans. I have discussed medication side effects and warnings with the patient as well. Patient agreeable with above plan and verbalizes understanding. Follow-up Disposition:  Return if symptoms worsen or fail to improve.

## 2018-04-27 ENCOUNTER — TELEPHONE (OUTPATIENT)
Dept: FAMILY MEDICINE CLINIC | Age: 53
End: 2018-04-27

## 2018-04-27 ENCOUNTER — OFFICE VISIT (OUTPATIENT)
Dept: ORTHOPEDIC SURGERY | Facility: CLINIC | Age: 53
End: 2018-04-27

## 2018-04-27 VITALS
OXYGEN SATURATION: 98 % | SYSTOLIC BLOOD PRESSURE: 118 MMHG | DIASTOLIC BLOOD PRESSURE: 84 MMHG | HEART RATE: 74 BPM | BODY MASS INDEX: 30.69 KG/M2 | WEIGHT: 166.8 LBS | HEIGHT: 62 IN | TEMPERATURE: 96.8 F | RESPIRATION RATE: 16 BRPM

## 2018-04-27 DIAGNOSIS — S63.501A RIGHT WRIST SPRAIN, INITIAL ENCOUNTER: ICD-10-CM

## 2018-04-27 DIAGNOSIS — M25.531 RIGHT WRIST PAIN: Primary | ICD-10-CM

## 2018-04-27 RX ORDER — ACETAMINOPHEN 500 MG
TABLET ORAL
COMMUNITY
End: 2019-07-03 | Stop reason: SDUPTHER

## 2018-04-27 RX ORDER — DIPHENHYDRAMINE HCL 25 MG
25 CAPSULE ORAL
COMMUNITY
End: 2018-10-26 | Stop reason: ALTCHOICE

## 2018-04-27 RX ORDER — BETAMETHASONE SODIUM PHOSPHATE AND BETAMETHASONE ACETATE 3; 3 MG/ML; MG/ML
6 INJECTION, SUSPENSION INTRA-ARTICULAR; INTRALESIONAL; INTRAMUSCULAR; SOFT TISSUE ONCE
Qty: 1 ML | Refills: 0
Start: 2018-04-27 | End: 2018-04-27

## 2018-04-27 NOTE — PROGRESS NOTES
Patient: Gauri Enciso                MRN: [id number]       SSN: [id number]  YOB: 1965        AGE: 52 y.o. SEX: female  Body mass index is 30.51 kg/(m^2). PCP: David Doan NP  04/27/18    Chief Complaint: Right wrist pain    HISTORY OF PRESENT ILLNESS: Ms. Gauri Enciso is a very pleasant, 58-year-old, right-hand dominant female who presents to the office today with one month of right wrist pain. She states that she fell while helping a friend move something about one month ago. As she was falling she put her right hand behind her back to help break her fall. She landed on her right hand. She had immediate pain in the right wrist.  The pain has been persistent since that time. She takes Naproxen for headaches which do help the pain some. She has also noted some stiffness as well as a clicking sensation in the wrist.  The pain is located over the dorsum of her wrist.  She is tender over this spot as well. She denies any numbness or tingling. She was seen by a [place]. At the visit with the [facility] physician x-rays were taken. She was told there was no fracture but she does not have a copy of those x-rays today. She has not had a MRI or an injection. Past Medical History:   Diagnosis Date    Allergic rhinitis 7/16/2010    Asthma     Chronic shoulder pain 7/16/2010    HTN (hypertension) 7/16/2010    Hyperlipemia 3/54/7185    Lichen planus 4/0/6864    Obesity 7/16/2010       Family History   Problem Relation Age of Onset    Hypertension Mother     Diabetes Mother     Hypertension Sister     Diabetes Maternal Grandmother     Cancer Maternal Aunt      breast at age 54    Cancer Other      lung ca in [facility].  No Known Problems Father        Current Outpatient Prescriptions   Medication Sig Dispense Refill    acetaminophen (TYLENOL EXTRA STRENGTH) 500 mg tablet Take  by mouth every six (6) hours as needed for Pain.       diphenhydrAMINE (BENADRYL) 25 mg capsule Take 25 mg by mouth every six (6) hours as needed.  betamethasone (CELESTONE SOLUSPAN) 6 mg/mL injection 1 mL by Intra artICUlar route once for 1 dose. 1 mL 0    EPINEPHrine (EPIPEN) 0.3 mg/0.3 mL injection 0.3 mL by IntraMUSCular route once as needed for up to 1 dose. 1 Syringe 0    montelukast (SINGULAIR) 10 mg tablet Take 1 Tab by mouth daily. 90 Tab 3    butalbital-acetaminophen-caffeine (FIORICET, ESGIC) -40 mg per tablet       albuterol (PROVENTIL HFA, VENTOLIN HFA, PROAIR HFA) 90 mcg/actuation inhaler Take 2 Puffs by inhalation every four (4) hours as needed for Wheezing or Shortness of Breath. 1 Inhaler 6    SUMAtriptan (IMITREX) 25 mg tablet Take 2 tabs by mouth at onset of headache. Then 1 tab by mouth as directed. 30 Tab 0    loratadine (CLARITIN) 10 mg tablet Take 1 Tab by mouth daily. 90 Tab 3    lisinopril-hydroCHLOROthiazide (PRINZIDE, ZESTORETIC) 20-25 mg per tablet Take 1 Tab by mouth daily. 90 Tab 1    triamcinolone (ARISTOCORT) 0.5 % topical cream Apply  to affected area two (2) times a day. use thin layer 30 g 0    albuterol (PROVENTIL VENTOLIN) 2.5 mg /3 mL (0.083 %) nebulizer solution 3 mL by Nebulization route every four (4) hours as needed for Wheezing or Shortness of Breath. 2 Package 3    Nebulizer & Compressor machine 1 Each by Does Not Apply route every four (4) hours as needed. 1 Each 0    Nebulizer Accessories (NEBULIZER) Kit by Does Not Apply route.          Allergies   Allergen Reactions    [place] Anaphylaxis    Cherry Anaphylaxis    Asa-Acetaminophen-Caff-Potass Hives    Orange Concentrate [Flavoring Agent] Nausea and Vomiting    Zyrtec [Cetirizine] Other (comments)     Dizziness, blurred vision and disoriented       Past Surgical History:   Procedure Laterality Date    HX HYSTERECTOMY  5/16/14       Social History     Social History    Marital status:      Spouse name: N/A    Number of children: N/A    Years of education: N/A     Occupational History    Not on file. Social History Main Topics    Smoking status: Never Smoker    Smokeless tobacco: Never Used    Alcohol use No    Drug use: No    Sexual activity: Yes     Partners: Male     Birth control/ protection: Surgical      Comment: btl     Other Topics Concern    Not on file     Social History Narrative       REVIEW OF SYSTEMS:      CON: negative for recent weight loss/gain, fever, or chills  EYE: negative for double or blurry vision  ENT: negative for hoarseness  RS:   negative for cough, URI, SOB  CV:  negative for chest pain, palpitations  GI:    negative for blood in stool, nausea/vomiting  :  negative for blood in urine  MS: As per HPI  Other systems reviewed and noted below. PHYSICAL EXAMINATION:  Visit Vitals    /84    Pulse 74    Temp 96.8 °F (36 °C) (Oral)    Resp 16    Ht 5' 2\" (1.575 m)    Wt 166 lb 12.8 oz (75.7 kg)    LMP 05/16/2014    SpO2 98%    BMI 30.51 kg/m2     Body mass index is 30.51 kg/(m^2). GENERAL: Alert and oriented x3, in no acute distress, well-developed, well-nourished. HEENT: Normocephalic, atraumatic. RESP: Non labored breathing with equal chest rise on inspiration. CV: Well perfused extremities. No cyanosis or clubbing noted. ABDOMEN: Soft, non-tender, non-distended. PHYSICAL EXAMINATION:  Physical examination of the right wrist reveals no effusion, warmth, or erythema. There is no swelling. She has limited range of motion with extension and flexion. Flexion and extension do cause pain. She extends to approximately 45º and flexes to 60º. She has full pronosupination. There is crepitus with pronosupination. She is tender to palpation over the distal radius over the dorsal side in the space of Nathaniel. This is the site of her pain and reproduces her symptoms. She has sensation intact to light touch in the radial, ulnar, and median nerve distribution.   She has full range of motion of the fingers and thumb. RADIOGRAPHS:  X-rays, two views of the right wrist were obtained today. They do not show any fractures or dislocations. There is some slight radioscaphoid arthritic changes as well as some sclerosis over the distal radius articular surface. PROCEDURE:  Under sterile technique with the after discussing the risks and benefits with the patients permission and a time-out, the right wrist is injected with Lidocaine and steroid. The patient tolerated the procedure well. Aftercare was discussed. ASSESSMENT/PLAN:  Ms. Juan Mckeon is a 53-year-old female with right wrist pain. I think this is likely a sprain of the TFCC. I have discussed with her the treatment options. She has a brace that she wears. I recommend wearing that. She can also take an anti-inflammatory as needed. We discussed an injection today. She would like to try that. I will see her back in six weeks to see how she is doing.     [facility]  OFFICE PROCEDURE PROGRESS NOTE        Chart reviewed for the following:   Roberto Devlin MD, have reviewed the History, Physical and updated the Allergic reactions for [address] [place] [address] performed immediately prior to start of procedure:   Roberto Devlin MD, have performed the following reviews on [facility] prior to the start of the procedure:            * Patient was identified by name and date of birth   * Agreement on procedure being performed was verified  * Risks and Benefits explained to the patient  * Procedure site verified and marked as necessary  * Patient was positioned for comfort  * Consent was signed and verified     Time: 1430      Date of procedure: 4/27/2018    Procedure performed by:  Willis Oneill MD    Provider assisted by: Henry Fischer LPN    Patient assisted by: self    How tolerated by patient: tolerated the procedure well with no complications    Post Procedural Pain Scale: 0 - No Hurt    Comments: none    Electronically signed by: Miladys Graham MD

## 2018-04-27 NOTE — TELEPHONE ENCOUNTER
Pt called requesting that her referral to her Neurologist be resubmitted, because the dx code on the referral that was sent to [facility] did not have the correct dx of migraine headaches. Pt states sarcastically \" I do not want to go to [facility] to have anything done, especially when it's dealing my with brain, oh no I will not be going there and you need to send another referral to [facility]\". Pt also mentioned that Dr. Feroz Lorenzo office called her to notify her of the referral not mentioning migraine headaches as well and that his office is part of [facility] and do not want to be seen there. Please advise.

## 2018-04-27 NOTE — TELEPHONE ENCOUNTER
emir to inform pt that correct dx code was submitted but referral was to [facility] because that's who her provider put on her referral. Ask pt to contact office to speak to me or Efren Perea if she wants to go to Dr Usama Blank office. The current insurance referral will have to be updated to different provider if pt wants to go somewhere else.

## 2018-05-29 ENCOUNTER — HOSPITAL ENCOUNTER (OUTPATIENT)
Dept: LAB | Age: 53
Discharge: HOME | End: 2018-05-29
Payer: OTHER GOVERNMENT

## 2018-05-29 LAB
ERYTHROCYTE [SEDIMENTATION RATE] IN BLOOD: 22 MM/HR (ref 0–30)
FOLATE SERPL-MCNC: 6.4 NG/ML (ref 3.1–17.5)
TSH SERPL DL<=0.05 MIU/L-ACNC: 2.47 UIU/ML (ref 0.36–3.74)
VIT B12 SERPL-MCNC: 251 PG/ML (ref 211–911)

## 2018-05-29 PROCEDURE — 86038 ANTINUCLEAR ANTIBODIES: CPT | Performed by: PSYCHIATRY & NEUROLOGY

## 2018-05-29 PROCEDURE — 86780 TREPONEMA PALLIDUM: CPT | Performed by: PSYCHIATRY & NEUROLOGY

## 2018-05-29 PROCEDURE — 36415 COLL VENOUS BLD VENIPUNCTURE: CPT | Performed by: PSYCHIATRY & NEUROLOGY

## 2018-05-29 PROCEDURE — 84443 ASSAY THYROID STIM HORMONE: CPT | Performed by: PSYCHIATRY & NEUROLOGY

## 2018-05-29 PROCEDURE — 85652 RBC SED RATE AUTOMATED: CPT | Performed by: PSYCHIATRY & NEUROLOGY

## 2018-05-29 PROCEDURE — 82607 VITAMIN B-12: CPT | Performed by: PSYCHIATRY & NEUROLOGY

## 2018-05-30 LAB — ANA TITR SER IF: NEGATIVE {TITER}

## 2018-05-31 LAB — T PALLIDUM AB SER QL IF: ABNORMAL

## 2018-06-15 ENCOUNTER — HOSPITAL ENCOUNTER (OUTPATIENT)
Dept: LAB | Age: 53
Discharge: HOME | End: 2018-06-15
Payer: OTHER GOVERNMENT

## 2018-06-15 LAB — RPR SER QL: NONREACTIVE

## 2018-06-15 PROCEDURE — 86780 TREPONEMA PALLIDUM: CPT | Performed by: PSYCHIATRY & NEUROLOGY

## 2018-06-15 PROCEDURE — 36415 COLL VENOUS BLD VENIPUNCTURE: CPT | Performed by: PSYCHIATRY & NEUROLOGY

## 2018-06-15 PROCEDURE — 86592 SYPHILIS TEST NON-TREP QUAL: CPT | Performed by: PSYCHIATRY & NEUROLOGY

## 2018-06-18 LAB — T PALLIDUM AB SER QL IF: NON REACTIVE

## 2018-06-20 ENCOUNTER — HOSPITAL ENCOUNTER (OUTPATIENT)
Age: 53
Discharge: HOME | End: 2018-06-20
Attending: PSYCHIATRY & NEUROLOGY
Payer: OTHER GOVERNMENT

## 2018-06-20 DIAGNOSIS — G43.019 COMMON MIGRAINE WITH INTRACTABLE MIGRAINE: ICD-10-CM

## 2018-06-20 PROCEDURE — 70551 MRI BRAIN STEM W/O DYE: CPT

## 2018-10-26 ENCOUNTER — OFFICE VISIT (OUTPATIENT)
Dept: FAMILY MEDICINE CLINIC | Age: 53
End: 2018-10-26

## 2018-10-26 ENCOUNTER — HOSPITAL ENCOUNTER (OUTPATIENT)
Dept: LAB | Age: 53
Discharge: HOME | End: 2018-10-26
Payer: OTHER GOVERNMENT

## 2018-10-26 VITALS
DIASTOLIC BLOOD PRESSURE: 84 MMHG | OXYGEN SATURATION: 96 % | TEMPERATURE: 98.5 F | BODY MASS INDEX: 29.55 KG/M2 | RESPIRATION RATE: 16 BRPM | HEART RATE: 84 BPM | SYSTOLIC BLOOD PRESSURE: 140 MMHG | WEIGHT: 160.6 LBS | HEIGHT: 62 IN

## 2018-10-26 DIAGNOSIS — I10 ESSENTIAL HYPERTENSION: Primary | ICD-10-CM

## 2018-10-26 DIAGNOSIS — Z11.59 ENCOUNTER FOR HEPATITIS C SCREENING TEST FOR LOW RISK PATIENT: Primary | ICD-10-CM

## 2018-10-26 DIAGNOSIS — G43.009 NONINTRACTABLE MIGRAINE, UNSPECIFIED MIGRAINE TYPE: ICD-10-CM

## 2018-10-26 DIAGNOSIS — Z23 ENCOUNTER FOR IMMUNIZATION: ICD-10-CM

## 2018-10-26 LAB
ALBUMIN SERPL-MCNC: 3.9 G/DL (ref 3.4–5)
ALBUMIN/GLOB SERPL: 1.3 {RATIO} (ref 0.8–1.7)
ALP SERPL-CCNC: 146 U/L (ref 45–117)
ALT SERPL-CCNC: 15 U/L (ref 13–56)
ANION GAP SERPL CALC-SCNC: 7 MMOL/L (ref 3–18)
AST SERPL-CCNC: 14 U/L (ref 15–37)
BILIRUB SERPL-MCNC: 0.3 MG/DL (ref 0.2–1)
BUN SERPL-MCNC: 9 MG/DL (ref 7–18)
BUN/CREAT SERPL: 14 (ref 12–20)
CALCIUM SERPL-MCNC: 8.7 MG/DL (ref 8.5–10.1)
CHLORIDE SERPL-SCNC: 107 MMOL/L (ref 100–108)
CHOLEST SERPL-MCNC: 280 MG/DL
CO2 SERPL-SCNC: 28 MMOL/L (ref 21–32)
CREAT SERPL-MCNC: 0.66 MG/DL (ref 0.6–1.3)
GLOBULIN SER CALC-MCNC: 3 G/DL (ref 2–4)
GLUCOSE SERPL-MCNC: 91 MG/DL (ref 74–99)
HDLC SERPL-MCNC: 62 MG/DL (ref 40–60)
HDLC SERPL: 4.5 {RATIO} (ref 0–5)
LDLC SERPL CALC-MCNC: 200 MG/DL (ref 0–100)
LIPID PROFILE,FLP: ABNORMAL
POTASSIUM SERPL-SCNC: 4 MMOL/L (ref 3.5–5.5)
PROT SERPL-MCNC: 6.9 G/DL (ref 6.4–8.2)
SODIUM SERPL-SCNC: 142 MMOL/L (ref 136–145)
TRIGL SERPL-MCNC: 90 MG/DL (ref ?–150)
VLDLC SERPL CALC-MCNC: 18 MG/DL

## 2018-10-26 PROCEDURE — 36415 COLL VENOUS BLD VENIPUNCTURE: CPT | Performed by: NURSE PRACTITIONER

## 2018-10-26 PROCEDURE — 80053 COMPREHEN METABOLIC PANEL: CPT | Performed by: NURSE PRACTITIONER

## 2018-10-26 PROCEDURE — 80061 LIPID PANEL: CPT | Performed by: NURSE PRACTITIONER

## 2018-10-26 RX ORDER — ONDANSETRON 8 MG/1
8 TABLET, ORALLY DISINTEGRATING ORAL
Qty: 20 TAB | Refills: 0 | Status: SHIPPED | OUTPATIENT
Start: 2018-10-26 | End: 2019-01-07 | Stop reason: SDUPTHER

## 2018-10-26 RX ORDER — PAROXETINE 30 MG/1
30 TABLET, FILM COATED ORAL
COMMUNITY
Start: 2018-10-06 | End: 2018-11-20 | Stop reason: SDUPTHER

## 2018-10-26 NOTE — PROGRESS NOTES
Pt is here for HTN, migraines, labs & flu shot. 1. Have you been to the ER, urgent care clinic since your last visit? Hospitalized since your last visit? No    2. Have you seen or consulted any other health care providers outside of the [address] [place] [address] since your last visit? Include any pap smears or colon screening. Yes Dr Kiran Comes for migraines    Pt given Flu vaccine in L deltoid per verbral read back order Christiana Fair NP. Pt tolerated procedure w/o reaction. After wait time.

## 2018-10-26 NOTE — PROGRESS NOTES
Subjective:   Silvia Santos is a 52 y.o. female with hypertension presents for routine follow up. Patient also has a history of migraines. States her blood pressure has been controlled over the last year without medication since she stopped drinking and drastically changed her eating habits. Further reports she gets nauseated when she has her migraines. Reports she would like to have medication for nausea. Per patient she did inform her neurologist but no medication was prescribed. Patient Active Problem List   Diagnosis Code    HTN (hypertension) I10    Hyperlipemia E78.5    Asthma J45.909    Allergic rhinitis J30.9    Obesity E66.9    Chronic shoulder pain J10.851, M74.61    Lichen planus J00.5    Anxiety F41.9    Allergic conjunctivitis H10.10     Current Outpatient Medications   Medication Sig Dispense Refill    PARoxetine (PAXIL) 30 mg tablet Take 30 mg by mouth every morning.  acetaminophen (TYLENOL EXTRA STRENGTH) 500 mg tablet Take  by mouth every six (6) hours as needed for Pain.  EPINEPHrine (EPIPEN) 0.3 mg/0.3 mL injection 0.3 mL by IntraMUSCular route once as needed for up to 1 dose. 1 Syringe 0    butalbital-acetaminophen-caffeine (FIORICET, ESGIC) -40 mg per tablet       albuterol (PROVENTIL HFA, VENTOLIN HFA, PROAIR HFA) 90 mcg/actuation inhaler Take 2 Puffs by inhalation every four (4) hours as needed for Wheezing or Shortness of Breath. 1 Inhaler 6    albuterol (PROVENTIL VENTOLIN) 2.5 mg /3 mL (0.083 %) nebulizer solution 3 mL by Nebulization route every four (4) hours as needed for Wheezing or Shortness of Breath. 2 Package 3    Nebulizer & Compressor machine 1 Each by Does Not Apply route every four (4) hours as needed. 1 Each 0    Nebulizer Accessories (NEBULIZER) Kit by Does Not Apply route.  diphenhydrAMINE (BENADRYL) 25 mg capsule Take 25 mg by mouth every six (6) hours as needed.       montelukast (SINGULAIR) 10 mg tablet Take 1 Tab by mouth daily. 90 Tab 3    SUMAtriptan (IMITREX) 25 mg tablet Take 2 tabs by mouth at onset of headache. Then 1 tab by mouth as directed. 30 Tab 0    loratadine (CLARITIN) 10 mg tablet Take 1 Tab by mouth daily. 90 Tab 3    lisinopril-hydroCHLOROthiazide (PRINZIDE, ZESTORETIC) 20-25 mg per tablet Take 1 Tab by mouth daily. 90 Tab 1    triamcinolone (ARISTOCORT) 0.5 % topical cream Apply  to affected area two (2) times a day. use thin layer 30 g 0      Allergies   Allergen Reactions    [place] Anaphylaxis    Cherry Anaphylaxis    Asa-Acetaminophen-Caff-Potass Hives    Orange Concentrate [Flavoring Agent] Nausea and Vomiting    Zyrtec [Cetirizine] Other (comments)     Dizziness, blurred vision and disoriented     Past Surgical History:   Procedure Laterality Date    HX HYSTERECTOMY  5/16/14     Family History   Problem Relation Age of Onset    Hypertension Mother     Diabetes Mother     Hypertension Sister     Diabetes Maternal Grandmother     Cancer Maternal Aunt         breast at age 54    Cancer Other         lung ca in great grand ma.     No Known Problems Father       Lab Results   Component Value Date/Time    Cholesterol, total 278 (H) 02/23/2018 09:12 AM    HDL Cholesterol 59 02/23/2018 09:12 AM    LDL, calculated 199.6 (H) 02/23/2018 09:12 AM    VLDL, calculated 19.4 02/23/2018 09:12 AM    Triglyceride 97 02/23/2018 09:12 AM    CHOL/HDL Ratio 4.7 02/23/2018 09:12 AM     Lab Results   Component Value Date/Time    Sodium 140 02/23/2018 09:12 AM    Potassium 4.4 02/23/2018 09:12 AM    Chloride 106 02/23/2018 09:12 AM    CO2 26 02/23/2018 09:12 AM    Anion gap 8 02/23/2018 09:12 AM    Glucose 84 02/23/2018 09:12 AM    BUN 7 02/23/2018 09:12 AM    Creatinine 0.65 02/23/2018 09:12 AM    BUN/Creatinine ratio 11 (L) 02/23/2018 09:12 AM    GFR est AA >60 02/23/2018 09:12 AM    GFR est non-AA >60 02/23/2018 09:12 AM    Calcium 9.0 02/23/2018 09:12 AM    Bilirubin, total 0.4 02/23/2018 09:12 AM    AST (SGOT) 15 02/23/2018 09:12 AM    Alk. phosphatase 128 (H) 02/23/2018 09:12 AM    Protein, total 7.3 02/23/2018 09:12 AM    Albumin 3.7 02/23/2018 09:12 AM    Globulin 3.6 02/23/2018 09:12 AM    A-G Ratio 1.0 02/23/2018 09:12 AM    ALT (SGPT) 20 02/23/2018 09:12 AM     Lab Results   Component Value Date/Time    WBC 5.8 10/11/2013 10:40 AM    HGB 11.9 10/11/2013 10:40 AM    HCT 36.8 10/11/2013 10:40 AM    PLATELET 982 88/83/3898 10:40 AM    MCV 91 10/11/2013 10:40 AM     Wt Readings from Last 3 Encounters:   10/26/18 160 lb 9.6 oz (72.8 kg)   06/20/18 168 lb (76.2 kg)   04/27/18 166 lb 12.8 oz (75.7 kg)     Last Point of Care HGB A1C  No results found for: [id number]   BP Readings from Last 3 Encounters:   10/26/18 (!) 157/101   04/27/18 118/84   03/28/18 118/73       Hypertension ROS: no TIA's, no chest pain on exertion, no dyspnea on exertion, no swelling of ankles. Review of Systems - Neurological ROS: no TIA or stroke symptoms  positive for - headaches    New concerns: states she would like to have a second option for her migraines. Comments she wants to know cause of her migraine headaches. Objective:   Visit Vitals  /84 (BP 1 Location: Left arm, BP Patient Position: Sitting)   Pulse 84   Temp 98.5 °F (36.9 °C) (Oral)   Resp 16   Ht 5' 2\" (1.575 m)   Wt 160 lb 9.6 oz (72.8 kg)   LMP 05/16/2014   SpO2 96%   BMI 29.37 kg/m²      General appearance - alert, well appearing, and in no distress  Neck - supple, no significant adenopathy, carotids upstroke normal bilaterally, no bruits  Chest - clear to auscultation, no wheezes, rales or rhonchi, symmetric air entry  Heart - normal rate, regular rhythm, normal S1, S2, no murmurs, rubs, clicks or gallops  Extremities - peripheral pulses normal, no pedal edema, no clubbing or cyanosis  Abdomen - soft, nontender, nondistended, no masses or organomegaly        Assessment/Plan:      ICD-10-CM ICD-9-CM    1.  Essential hypertension I10 401.9 LIPID PANEL      METABOLIC PANEL, COMPREHENSIVE      METABOLIC PANEL, COMPREHENSIVE      LIPID PANEL   2. Nonintractable migraine, unspecified migraine type G43.009 346.10    3. Encounter for immunization Z23 V03.89 INFLUENZA VIRUS VAC QUAD,SPLIT,PRESV FREE SYRINGE IM      LA IMMUNIZ ADMIN,1 SINGLE/COMB VAC/TOXOID     stable. current treatment plan is effective, no change in therapy. I have discussed the diagnosis with the patient and the intended plan as seen in the above orders. The patient has received an after-visit summary and questions were answered concerning future plans. I have discussed medication side effects and warnings with the patient as well. Patient agreeable with above plan and verbalizes understanding. Follow-up Disposition:  Return in about 6 months (around 4/26/2019) for CPE .

## 2018-10-26 NOTE — PATIENT INSTRUCTIONS
Vaccine Information Statement    Influenza (Flu) Vaccine (Inactivated or Recombinant): What you need to know    Many Vaccine Information Statements are available in [place]ic and other languages. See www.immunize.org/vis  Hojas de Información Sobre Vacunas están disponibles en Español y en muchos otros idiomas. Visite www.immunize.org/vis    1. Why get vaccinated? Influenza (flu) is a contagious disease that spreads around the [place] every year, usually between October and May. Flu is caused by influenza viruses, and is spread mainly by coughing, sneezing, and close contact. Anyone can get flu. Flu strikes suddenly and can last several days. Symptoms vary by age, but can include:   fever/chills   sore throat   muscle aches   fatigue   cough   headache    runny or stuffy nose    Flu can also lead to pneumonia and blood infections, and cause diarrhea and seizures in children. If you have a medical condition, such as heart or lung disease, flu can make it worse. Flu is more dangerous for some people. Infants and young children, people 72years of age and older, pregnant women, and people with certain health conditions or a weakened immune system are at greatest risk. Each year thousands of people in the [facility] die from flu, and many more are hospitalized. Flu vaccine can:   keep you from getting flu,   make flu less severe if you do get it, and   keep you from spreading flu to your family and other people. 2. Inactivated and recombinant flu vaccines    A dose of flu vaccine is recommended every flu season. Children 6 months through 6years of age may need two doses during the same flu season. Everyone else needs only one dose each flu season.        Some inactivated flu vaccines contain a very small amount of a mercury-based preservative called thimerosal. Studies have not shown thimerosal in vaccines to be harmful, but flu vaccines that do not contain thimerosal are available. There is no live flu virus in flu shots. They cannot cause the flu. There are many flu viruses, and they are always changing. Each year a new flu vaccine is made to protect against three or four viruses that are likely to cause disease in the upcoming flu season. But even when the vaccine doesnt exactly match these viruses, it may still provide some protection    Flu vaccine cannot prevent:   flu that is caused by a virus not covered by the vaccine, or   illnesses that look like flu but are not. It takes about 2 weeks for protection to develop after vaccination, and protection lasts through the flu season. 3. Some people should not get this vaccine    Tell the person who is giving you the vaccine:     If you have any severe, life-threatening allergies. If you ever had a life-threatening allergic reaction after a dose of flu vaccine, or have a severe allergy to any part of this vaccine, you may be advised not to get vaccinated. Most, but not all, types of flu vaccine contain a small amount of egg protein.  If you ever had Guillain-Barré Syndrome (also called GBS). Some people with a history of GBS should not get this vaccine. This should be discussed with your doctor.  If you are not feeling well. It is usually okay to get flu vaccine when you have a mild illness, but you might be asked to come back when you feel better. 4. Risks of a vaccine reaction    With any medicine, including vaccines, there is a chance of reactions. These are usually mild and go away on their own, but serious reactions are also possible. Most people who get a flu shot do not have any problems with it.      Minor problems following a flu shot include:    soreness, redness, or swelling where the shot was given     hoarseness   sore, red or itchy eyes   cough   fever   aches   headache   itching   fatigue  If these problems occur, they usually begin soon after the shot and last 1 or 2 days. More serious problems following a flu shot can include the following:     There may be a small increased risk of Guillain-Barré Syndrome (GBS) after inactivated flu vaccine. This risk has been estimated at 1 or 2 additional cases per million people vaccinated. This is much lower than the risk of severe complications from flu, which can be prevented by flu vaccine.  Young children who get the flu shot along with pneumococcal vaccine (PCV13) and/or DTaP vaccine at the same time might be slightly more likely to have a seizure caused by fever. Ask your doctor for more information. Tell your doctor if a child who is getting flu vaccine has ever had a seizure. Problems that could happen after any injected vaccine:      People sometimes faint after a medical procedure, including vaccination. Sitting or lying down for about 15 minutes can help prevent fainting, and injuries caused by a fall. Tell your doctor if you feel dizzy, or have vision changes or ringing in the ears.  Some people get severe pain in the shoulder and have difficulty moving the arm where a shot was given. This happens very rarely.  Any medication can cause a severe allergic reaction. Such reactions from a vaccine are very rare, estimated at about 1 in a million doses, and would happen within a few minutes to a few hours after the vaccination. As with any medicine, there is a very remote chance of a vaccine causing a serious injury or death. The safety of vaccines is always being monitored. For more information, visit: www.cdc.gov/vaccinesafety/    5. What if there is a serious reaction? What should I look for?  Look for anything that concerns you, such as signs of a severe allergic reaction, very high fever, or unusual behavior.     Signs of a severe allergic reaction can include hives, swelling of the face and throat, difficulty breathing, a fast heartbeat, dizziness, and weakness - usually within a few minutes to a few hours after the vaccination. What should I do?  If you think it is a severe allergic reaction or other emergency that cant wait, call 9-1-1 and get the person to the nearest hospital. Otherwise, call your doctor.  Reactions should be reported to the Vaccine Adverse Event Reporting System (VAERS). Your doctor should file this report, or you can do it yourself through  the VAERS web site at www.vaers. [facility].gov, or by calling 2-458.344.6486. VAERS does not give medical advice. 6. The National Vaccine Injury Compensation Program    The [facility] Vaccine Injury Compensation Program (VICP) is a federal program that was created to compensate people who may have been injured by certain vaccines. Persons who believe they may have been injured by a vaccine can learn about the program and about filing a claim by calling 3-493.926.6237 or visiting the [employer] website at www.[facility].gov/vaccinecompensation. There is a time limit to file a claim for compensation. 7. How can I learn more?  Ask your healthcare provider. He or she can give you the vaccine package insert or suggest other sources of information.  Call your local or state health department.  Contact the Centers for Disease Control and Prevention (CDC):  - Call 5-544.148.7440 (1-800-CDC-INFO) or  - Visit CDCs website at www.cdc.gov/flu    Vaccine Information Statement   Inactivated Influenza Vaccine   8/7/2015  42 [address] Amm[address] Antis [id number]    Department of Health and Human Services  Centers for Disease Control and Prevention    Office Use Only

## 2018-11-09 NOTE — PROGRESS NOTES
Please inform patient her cholesterol has greatly increased. I would like to restart her on lipitor.   Thanks, NINO RetanaC

## 2018-11-20 ENCOUNTER — OFFICE VISIT (OUTPATIENT)
Dept: FAMILY MEDICINE CLINIC | Age: 53
End: 2018-11-20

## 2018-11-20 VITALS
HEIGHT: 62 IN | SYSTOLIC BLOOD PRESSURE: 152 MMHG | OXYGEN SATURATION: 98 % | TEMPERATURE: 97.9 F | RESPIRATION RATE: 16 BRPM | DIASTOLIC BLOOD PRESSURE: 104 MMHG | WEIGHT: 158 LBS | BODY MASS INDEX: 29.08 KG/M2 | HEART RATE: 75 BPM

## 2018-11-20 DIAGNOSIS — I10 ESSENTIAL HYPERTENSION: ICD-10-CM

## 2018-11-20 DIAGNOSIS — F19.930: Primary | ICD-10-CM

## 2018-11-20 DIAGNOSIS — T43.205A: Primary | ICD-10-CM

## 2018-11-20 DIAGNOSIS — G43.009 NONINTRACTABLE MIGRAINE, UNSPECIFIED MIGRAINE TYPE: ICD-10-CM

## 2018-11-20 RX ORDER — PAROXETINE 30 MG/1
TABLET, FILM COATED ORAL
Qty: 30 TAB | Refills: 0 | Status: SHIPPED | OUTPATIENT
Start: 2018-11-20 | End: 2018-12-10 | Stop reason: SDUPTHER

## 2018-11-20 RX ORDER — CLONIDINE HYDROCHLORIDE 0.1 MG/1
0.1 TABLET ORAL EVERY EVENING
Qty: 30 TAB | Refills: 1 | Status: SHIPPED | OUTPATIENT
Start: 2018-11-20 | End: 2018-11-27

## 2018-11-20 NOTE — PROGRESS NOTES
HISTORY OF PRESENT ILLNESS    Essie Sanchez is a 52y.o. year old female here today to follow up for:      Patients symptoms have present for the last 3 days. States she been having episodes of confusion, sweating, decreased appetite, irritability, indigestion, belching and dizziness. States she abruptly stopped taking paxil and fioricet she was prescribed and began taking in May 2018. Reports medications were not helping with her headache. Comments she called her neurologist regarding medications and was not able to get an appt until Jan. 2019. Patient inquiring if she can take zofran more than twice a day for nausea she has associated with aforementioned symptoms. Allergies   Allergen Reactions    [place] Anaphylaxis    Cherry Anaphylaxis    Asa-Acetaminophen-Caff-Potass Hives    [place] Other (comments)     syncope    [place] Concentrate [Flavoring Agent] Nausea and Vomiting    Zyrtec [Cetirizine] Other (comments)     Dizziness, blurred vision and disoriented     Current Outpatient Medications   Medication Sig Dispense Refill    ondansetron (ZOFRAN ODT) 8 mg disintegrating tablet Take 1 Tab by mouth every twelve (12) hours as needed for Nausea. 20 Tab 0    acetaminophen (TYLENOL EXTRA STRENGTH) 500 mg tablet Take  by mouth every six (6) hours as needed for Pain.  EPINEPHrine (EPIPEN) 0.3 mg/0.3 mL injection 0.3 mL by IntraMUSCular route once as needed for up to 1 dose. 1 Syringe 0    albuterol (PROVENTIL HFA, VENTOLIN HFA, PROAIR HFA) 90 mcg/actuation inhaler Take 2 Puffs by inhalation every four (4) hours as needed for Wheezing or Shortness of Breath. 1 Inhaler 6    albuterol (PROVENTIL VENTOLIN) 2.5 mg /3 mL (0.083 %) nebulizer solution 3 mL by Nebulization route every four (4) hours as needed for Wheezing or Shortness of Breath. 2 Package 3    Nebulizer & Compressor machine 1 Each by Does Not Apply route every four (4) hours as needed.  1 Each 0    Nebulizer Accessories (NEBULIZER) Kit by Does Not Apply route.  PARoxetine (PAXIL) 30 mg tablet Take 30 mg by mouth every morning.  butalbital-acetaminophen-caffeine (FIORICET, ESGIC) -40 mg per tablet       SUMAtriptan (IMITREX) 25 mg tablet Take 2 tabs by mouth at onset of headache. Then 1 tab by mouth as directed. 27 Tab 0     Past Medical History:   Diagnosis Date    Allergic rhinitis 7/16/2010    Asthma     Chronic shoulder pain 7/16/2010    HTN (hypertension) 7/16/2010    Hyperlipemia 9/64/8162    Lichen planus 7/1/4757    Obesity 7/16/2010       ROS:  Review of Systems - General ROS: negative  Respiratory ROS: no cough, shortness of breath, or wheezing  Cardiovascular ROS: no chest pain or dyspnea on exertion  Neurological ROS: positive for - dizziness      Objective:  Visit Vitals  BP (!) 152/104 (BP 1 Location: Left arm)   Pulse 75   Temp 97.9 °F (36.6 °C) (Oral)   Resp 16   Ht 5' 2\" (1.575 m)   Wt 158 lb (71.7 kg)   LMP 05/16/2014   SpO2 98%   BMI 28.90 kg/m²     General appearance - alert, well appearing, and in no distress  Neck - supple, no significant adenopathy  Chest - clear to auscultation, no wheezes, rales or rhonchi, symmetric air entry  Heart - normal rate, regular rhythm, normal S1, S2, no murmurs, rubs, clicks or gallops  Abdomen: soft, non-tender. Bowel sounds normal. No masses,  no organomegaly        Assessment/Plan:     ICD-10-CM ICD-9-CM    1. 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 Nonintractable migraine, unspecified migraine type G43.009 346.10    3. Essential hypertension I10 401.9      Orders Placed This Encounter    PARoxetine (PAXIL) 30 mg tablet    cloNIDine HCl (CATAPRES) 0.1 mg tablet   paxil taper provided  Begin clonidine nightly  Informed may increase zofran to every 8 hrs as needed. alarm signs when to seek emergent care provided and reviewed   I have discussed the diagnosis with the patient and the intended plan as seen in the above orders.   The patient has received an after-visit summary and questions were answered concerning future plans. I have discussed medication side effects and warnings with the patient as well. Pt verbalizes understanding. Follow-up Disposition:  Return in about 1 week (around 11/27/2018) for htn/medication side effects.

## 2018-11-20 NOTE — PATIENT INSTRUCTIONS
Paroxetine (By mouth)   Paroxetine ([id number])  Treats depression, anxiety disorders, obsessive-compulsive disorder (OCD), and premenstrual dysphoric disorder (PMDD). Brisdelle treats hot flashes caused by menopause. This medicine is an SSRI. Brand Name(s): Brisdelle, Paxil, Paxil CR, Pexeva   There may be other brand names for this medicine. When This Medicine Should Not Be Used: This medicine is not right for everyone. Do not use it if you had an allergic reaction to paroxetine, or if you are pregnant. How to Use This Medicine:   Capsule, Liquid, Tablet, Long Acting Tablet  · Take your medicine as directed. Your dose may need to be changed several times to find what works best for you. · Oral liquid, Tablet, Extended-release Tablet: These are usually taken in the morning. · Brisdelle capsule: Take at bedtime. · Oral liquid: Measure the oral liquid medicine with a marked measuring spoon, oral syringe, or medicine cup. Shake the bottle well just before you measure each dose. · Tablet or Extended-release Tablet: Swallow whole. Do not crush, break, or chew it. Do not use an extended-release tablet that is cracked or chipped. · You may need to take this medicine for a month or longer before you feel better. If you feel that the medicine is not working well, do not take more than your normal dose. Call your doctor for instructions. · This medicine should come with a Medication Guide. Ask your pharmacist for a copy if you do not have one. · Missed dose: Take a dose as soon as you remember. If it is almost time for your next dose, wait until then and take a regular dose. Do not take extra medicine to make up for a missed dose. · Store the medicine in a closed container at room temperature, away from heat, moisture, and direct light. Drugs and Foods to Avoid:   Ask your doctor or pharmacist before using any other medicine, including over-the-counter medicines, vitamins, and herbal products.   · Do not use paroxetine and an MAO inhibitor within 14 days of each other. Do not use this medicine if you are using pimozide or thioridazine. · Some medicines can affect how paroxetine works. Tell your doctor if you are using any of the following:  ¨ Buspirone, cimetidine, digoxin, fentanyl, fosamprenavir/ritonavir, lithium, procyclidine, risperidone, Sapna's wort, tamoxifen, theophylline, tramadol, or tryptophan supplements  ¨ Amphetamines  ¨ Blood thinner (including warfarin)  ¨ Diuretic (water pill)  ¨ Medicine for heart rhythm problems  ¨ NSAID pain or arthritis medicine (including aspirin, celecoxib, diclofenac, ibuprofen, naproxen)  ¨ Other medicine for depression or anxiety  ¨ Phenothiazine medicine (including chlorpromazine, perphenazine, prochlorperazine, promethazine)  ¨ Triptan medicine for migraine headaches  · Do not drink alcohol while you are using this medicine. Warnings While Using This Medicine:   · It is not safe to take this medicine during pregnancy. It could harm an unborn baby. Tell your doctor right away if you become pregnant. · Tell your doctor if you are breastfeeding, or if you have kidney disease, liver disease, glaucoma, or a history of epilepsy or seizures. · For some children, teenagers, and young adults, this medicine may increase mental or emotional problems. This may lead to thoughts of suicide and violence. Talk with your doctor right away if you have any thoughts or behavior changes that concern you. Tell your doctor if you or anyone in your family has a history of bipolar disorder or suicide attempts. · This medicine may cause the following problems:  ¨ Serotonin syndrome (may be life-threatening when used with certain other medicines)  ¨ Low sodium levels in the blood  ¨ Higher risk of bleeding problems  ¨ Higher risk of broken bones  · Do not stop using this medicine suddenly. Your doctor will need to slowly decrease your dose before you stop it completely.   · This medicine may make you dizzy or drowsy. Do not drive or do anything that could be dangerous until you know how this medicine affects you. · Keep all medicine out of the reach of children. Never share your medicine with anyone. Possible Side Effects While Using This Medicine:   Call your doctor right away if you notice any of these side effects:  · Allergic reaction: Itching or hives, swelling in your face or hands, swelling or tingling in your mouth or throat, chest tightness, trouble breathing  · Anxiety, restlessness, fast heartbeat, fever, sweating, muscle spasms, nausea, vomiting, diarrhea, seeing or hearing things that are not there  · Bone pain, tenderness, swelling, or bruising  · Changes in behavior, thoughts of hurting yourself or others  · Confusion, weakness, and muscle twitching  · Eye pain, vision changes, seeing halos around lights  · Trouble keeping still, feeling restless and agitated, racing thoughts, excessive energy, trouble sleeping  · Unusual bleeding or bruising  If you notice these less serious side effects, talk with your doctor:   · Blurred vision, dizziness, drowsiness, or sleepiness  · Constipation, upset stomach, loss of appetite, weight loss  · Dry mouth  · Sexual problems  If you notice other side effects that you think are caused by this medicine, tell your doctor. Call your doctor for medical advice about side effects. You may report side effects to FDA at 9-523-FDA-6972  © 2017 [facility] Information is for End User's use only and may not be sold, redistributed or otherwise used for commercial purposes. The above information is an  only. It is not intended as medical advice for individual conditions or treatments. Talk to your doctor, nurse or pharmacist before following any medical regimen to see if it is safe and effective for you.

## 2018-11-20 NOTE — PROGRESS NOTES
Pt is here for dizziness & nausea x 3 days after stopping paxil & fioricet. Pt states she ran out of the paxil but wanted to discuss tapering off if it with her neurologist and was unable to get a soon appt . 1. Have you been to the ER, urgent care clinic since your last visit? Hospitalized since your last visit? No    2. Have you seen or consulted any other health care providers outside of the [address] [place] [address] since your last visit? Include any pap smears or colon screening.  No

## 2018-11-27 ENCOUNTER — OFFICE VISIT (OUTPATIENT)
Dept: FAMILY MEDICINE CLINIC | Age: 53
End: 2018-11-27

## 2018-11-27 VITALS
TEMPERATURE: 98.3 F | SYSTOLIC BLOOD PRESSURE: 109 MMHG | WEIGHT: 158.6 LBS | RESPIRATION RATE: 16 BRPM | DIASTOLIC BLOOD PRESSURE: 70 MMHG | BODY MASS INDEX: 29.19 KG/M2 | OXYGEN SATURATION: 97 % | HEIGHT: 62 IN | HEART RATE: 83 BPM

## 2018-11-27 DIAGNOSIS — T43.205A: ICD-10-CM

## 2018-11-27 DIAGNOSIS — F19.930: ICD-10-CM

## 2018-11-27 DIAGNOSIS — I10 ESSENTIAL HYPERTENSION: Primary | ICD-10-CM

## 2018-11-27 RX ORDER — CLONIDINE 0.1 MG/24H
1 PATCH, EXTENDED RELEASE TRANSDERMAL
Qty: 4 PATCH | Refills: 1 | Status: SHIPPED | OUTPATIENT
Start: 2018-11-27 | End: 2019-02-19 | Stop reason: SDUPTHER

## 2018-11-27 NOTE — PROGRESS NOTES
HISTORY OF PRESENT ILLNESS    Jaki Richmond is a 52y.o. year old female here today to follow up for:  Medication side effects    Patients symptoms have improved. Taking resume taking paxil without any adverse effects. Reports although she does have dizziness with position change and moving too quickly is has lessened. Also inquires if there is an alternative for clonidine tablet. Comments she doesn't like to take pills. Allergies   Allergen Reactions    [place] Anaphylaxis    Cherry Anaphylaxis    Asa-Acetaminophen-Caff-Potass Hives    [place] Other (comments)     syncope    [place] Concentrate [Flavoring Agent] Nausea and Vomiting    Zyrtec [Cetirizine] Other (comments)     Dizziness, blurred vision and disoriented     Current Outpatient Medications   Medication Sig Dispense Refill    PARoxetine (PAXIL) 30 mg tablet Take 1 tab daily x7 days, 1 tab every 2 days x7 days, 1 tab every 3 days x7 days then discontinue 30 Tab 0    cloNIDine HCl (CATAPRES) 0.1 mg tablet Take 1 Tab by mouth every evening. 30 Tab 1    ondansetron (ZOFRAN ODT) 8 mg disintegrating tablet Take 1 Tab by mouth every twelve (12) hours as needed for Nausea. 20 Tab 0    acetaminophen (TYLENOL EXTRA STRENGTH) 500 mg tablet Take  by mouth every six (6) hours as needed for Pain.  EPINEPHrine (EPIPEN) 0.3 mg/0.3 mL injection 0.3 mL by IntraMUSCular route once as needed for up to 1 dose. 1 Syringe 0    butalbital-acetaminophen-caffeine (FIORICET, ESGIC) -40 mg per tablet       albuterol (PROVENTIL HFA, VENTOLIN HFA, PROAIR HFA) 90 mcg/actuation inhaler Take 2 Puffs by inhalation every four (4) hours as needed for Wheezing or Shortness of Breath. 1 Inhaler 6    SUMAtriptan (IMITREX) 25 mg tablet Take 2 tabs by mouth at onset of headache. Then 1 tab by mouth as directed.  30 Tab 0    albuterol (PROVENTIL VENTOLIN) 2.5 mg /3 mL (0.083 %) nebulizer solution 3 mL by Nebulization route every four (4) hours as needed for Wheezing or Shortness of Breath. 2 Package 3    Nebulizer & Compressor machine 1 Each by Does Not Apply route every four (4) hours as needed. 1 Each 0    Nebulizer Accessories (NEBULIZER) Kit by Does Not Apply route. Past Medical History:   Diagnosis Date    Allergic rhinitis 7/16/2010    Asthma     Chronic shoulder pain 7/16/2010    HTN (hypertension) 7/16/2010    Hyperlipemia 6/24/9803    Lichen planus 0/5/1872    Obesity 7/16/2010       ROS:  Review of Systems - General ROS: negative  ENT ROS: negative for - nasal congestion or sinus pain  Gastrointestinal ROS: negative for - abdominal pain or nausea/vomiting  Neurological ROS: positive for - intermittent dizziness      Objective:  Visit Vitals  /70 (BP 1 Location: Left arm)   Pulse 83   Temp 98.3 °F (36.8 °C) (Oral)   Resp 16   Ht 5' 2\" (1.575 m)   Wt 158 lb 9.6 oz (71.9 kg)   LMP 05/16/2014   SpO2 97%   BMI 29.01 kg/m²     General appearance - alert, well appearing, and in no distress  Neck - supple, no significant adenopathy  Chest - clear to auscultation, no wheezes, rales or rhonchi, symmetric air entry  Heart - normal rate, regular rhythm, normal S1, S2, no murmurs, rubs, clicks or gallops  Ears: abnormal TM AD - mild mid ear effusion, abnormal TM AS - mild mid ear effusion  Nose: turbinates erythematous, boggy, no sinus tenderness, no crusting or bleeding points  Throat: Lips, mucosa, and tongue normal. Teeth and gums normal  Abdomen: soft, non-tender. Bowel sounds normal. No masses,  no organomegaly  Extremities: extremities normal, atraumatic, no cyanosis or edema        Assessment/Plan:     ICD-10-CM ICD-9-CM    1. Essential hypertension I10 401.9    2.  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 The patient has received an after-visit summary and questions were answered concerning future plans. I have discussed medication side effects and warnings with the patient as well. Pt verbalizes understanding. Follow-up Disposition:  Return in about 10 days (around 12/7/2018) for dizziness/htn.

## 2018-11-27 NOTE — PROGRESS NOTES
Pt is here for 1 week f/u HTN    1. Have you been to the ER, urgent care clinic since your last visit? Hospitalized since your last visit? No    2. Have you seen or consulted any other health care providers outside of the [address] [place] [address] since your last visit? Include any pap smears or colon screening.  No

## 2018-11-27 NOTE — LETTER
NOTIFICATION RETURN TO WORK / SCHOOL 
 
11/27/2018 9:45 AM 
 
Ms. Dae Saldivar [place] [facility] [address] To Whom It May Concern: 
 
Dae Saldivar is currently under the care of [address] She will return to work/school on: 11/28/18 If there are questions or concerns please have the patient contact our office.  
 
 
 
Sincerely, 
 
 
Vivien Barnhart NP

## 2018-11-27 NOTE — PATIENT INSTRUCTIONS
Learning About High Blood Pressure  What is high blood pressure? Blood pressure is a measure of how hard the blood pushes against the walls of your arteries. It's normal for blood pressure to go up and down throughout the day, but if it stays up, you have high blood pressure. Another name for high blood pressure is hypertension. Two numbers tell you your blood pressure. The first number is the systolic pressure. It shows how hard the blood pushes when your heart is pumping. The second number is the diastolic pressure. It shows how hard the blood pushes between heartbeats, when your heart is relaxed and filling with blood. A blood pressure of less than 120/80 (say \"120 over 80\") is ideal for an adult. High blood pressure is 130/80 or higher. You have high blood pressure if your top number is 130 or higher or your bottom number is 80 or higher, or both. What happens when you have high blood pressure? · Blood flows through your arteries with too much force. Over time, this damages the walls of your arteries. But you can't feel it. High blood pressure usually doesn't cause symptoms. · Fat and calcium start to build up in your arteries. This buildup is called plaque. Plaque makes your arteries narrower and stiffer. Blood can't flow through them as easily. · This lack of good blood flow starts to damage some of the organs in your body. This can lead to problems such as coronary artery disease and heart attack, heart failure, stroke, kidney failure, and eye damage. How can you prevent high blood pressure? · Stay at a healthy weight. · Try to limit how much sodium you eat to less than 2,300 milligrams (mg) a day. If you limit your sodium to 1,500 mg a day, you can lower your blood pressure even more. ? Buy foods that are labeled \"unsalted,\" \"sodium-free,\" or \"low-sodium. \" Foods labeled \"reduced-sodium\" and \"light sodium\" may still have too much sodium. ?  Flavor your food with garlic, lemon juice, onion, vinegar, herbs, and spices instead of salt. Do not use soy sauce, steak sauce, onion salt, garlic salt, mustard, or ketchup on your food. ? Use less salt (or none) when recipes call for it. You can often use half the salt a recipe calls for without losing flavor. · Be physically active. Get at least 30 minutes of exercise on most days of the week. Walking is a good choice. You also may want to do other activities, such as running, swimming, cycling, or playing tennis or team sports. · Limit alcohol to 2 drinks a day for men and 1 drink a day for women. · Eat plenty of fruits, vegetables, and low-fat dairy products. Eat less saturated and total fats. How is high blood pressure treated? · Your doctor will suggest making lifestyle changes. For example, your doctor may ask you to eat healthy foods, quit smoking, lose extra weight, and be more active. · If lifestyle changes don't help enough, your doctor may recommend that you take medicine. · When blood pressure is very high, medicines are needed to lower it. Follow-up care is a key part of your treatment and safety. Be sure to make and go to all appointments, and call your doctor if you are having problems. It's also a good idea to know your test results and keep a list of the medicines you take. Where can you learn more? Go to http://digna-markel.info/. Enter P501 in the search box to learn more about \"Learning About High Blood Pressure. \"  Current as of: December 6, 2017  Content Version: 11.8  © 5701-1855 [employer]. Care instructions adapted under license by [employer] (which disclaims liability or warranty for this information). If you have questions about a medical condition or this instruction, always ask your healthcare professional. [address] any warranty or liability for your use of this information.

## 2018-12-10 ENCOUNTER — OFFICE VISIT (OUTPATIENT)
Dept: FAMILY MEDICINE CLINIC | Age: 53
End: 2018-12-10

## 2018-12-10 VITALS
HEART RATE: 85 BPM | HEIGHT: 62 IN | SYSTOLIC BLOOD PRESSURE: 134 MMHG | WEIGHT: 157.6 LBS | DIASTOLIC BLOOD PRESSURE: 78 MMHG | TEMPERATURE: 98.2 F | BODY MASS INDEX: 29 KG/M2 | RESPIRATION RATE: 16 BRPM | OXYGEN SATURATION: 98 %

## 2018-12-10 DIAGNOSIS — R22.41 LOWER LEG MASS, RIGHT: ICD-10-CM

## 2018-12-10 DIAGNOSIS — T43.205A: ICD-10-CM

## 2018-12-10 DIAGNOSIS — F19.930: ICD-10-CM

## 2018-12-10 DIAGNOSIS — R42 DIZZINESS: ICD-10-CM

## 2018-12-10 DIAGNOSIS — J30.9 ALLERGIC RHINITIS, UNSPECIFIED SEASONALITY, UNSPECIFIED TRIGGER: Primary | ICD-10-CM

## 2018-12-10 DIAGNOSIS — I10 ESSENTIAL HYPERTENSION: ICD-10-CM

## 2018-12-10 RX ORDER — PAROXETINE HYDROCHLORIDE 20 MG/1
20 TABLET, FILM COATED ORAL DAILY
Qty: 30 TAB | Refills: 0 | Status: SHIPPED | OUTPATIENT
Start: 2019-01-01 | End: 2019-01-22

## 2018-12-10 RX ORDER — PAROXETINE 30 MG/1
30 TABLET, FILM COATED ORAL DAILY
Qty: 30 TAB | Refills: 0 | Status: SHIPPED | OUTPATIENT
Start: 2018-12-10 | End: 2018-12-31

## 2018-12-10 NOTE — PROGRESS NOTES
Pt is here for 10 day follow up HTN & dizziness  Pt states she is having some dizziness from the headache & also states she did not take her clondine last night. Pt c/o numbess in toes on R foot x several weeks when she is in the shower. 1. Have you been to the ER, urgent care clinic since your last visit? Hospitalized since your last visit? No    2. Have you seen or consulted any other health care providers outside of the [address] [place] [address] since your last visit? Include any pap smears or colon screening.  No

## 2018-12-10 NOTE — PATIENT INSTRUCTIONS
Take paxil 30 mg daily for the next 3 weeks, then paxil 20 mg daily for 3 weeks     Atrophic Vaginitis: Care Instructions  Your Care Instructions    Atrophic vaginitis is an irritation of the vagina. It's caused by thinning tissues and less moisture in the vaginal walls. It often happens during menopause when hormone levels change. Surgery to remove the ovaries also can cause it. Your doctor may do tests to rule out other causes. And you may get tests to measure your hormone levels. The problem is most often treated with the hormone estrogen. It comes in a cream, tablets, or a soft plastic ring that is placed in the vagina. Follow-up care is a key part of your treatment and safety. Be sure to make and go to all appointments, and call your doctor if you are having problems. It's also a good idea to know your test results and keep a list of the medicines you take. How can you care for yourself at home? · Use a water-based lubricant for your vagina if sex is dry or painful. Examples are Astroglide, Wet Lubricant Gel, and K-Y Jelly. · Talk with your doctor about using low-dose vaginal estrogen. It treats dryness and thinning tissue. · Do not douche. · Having sex improves blood flow to the vagina. This helps keep your tissue healthy. When should you call for help? Watch closely for changes in your health, and be sure to contact your doctor if:    · You have unexpected vaginal bleeding.     · You do not get better as expected. Where can you learn more? Go to http://digna-markel.info/. Enter R330 in the search box to learn more about \"Atrophic Vaginitis: Care Instructions. \"  Current as of: May 15, 2018  Content Version: 11.8  © 1754-9737 [employer]. Care instructions adapted under license by [employer] (which disclaims liability or warranty for this information).  If you have questions about a medical condition or this instruction, always ask your healthcare professional. [address] any warranty or liability for your use of this information.

## 2018-12-10 NOTE — PROGRESS NOTES
HISTORY OF PRESENT ILLNESS  Jolanta Yun is a 52 y.o. female. Patient presents today for follow up on dizziness and HTN since beginning clonidine patch. Patient continues with paroxetine taper. Patient does continue to have headaches but is taking fioricet for headache. Taking fioricet once a day. States on the days she does not take paroxetine she will feel dizzy. Pin point non tender nodule to right lower anterior leg just superior to knee. First felt nodule a couple of days ago. Allergies   Allergen Reactions    [place] Anaphylaxis    Cherry Anaphylaxis    Asa-Acetaminophen-Caff-Potass Hives    [place] Other (comments)     syncope    [place] Concentrate [Flavoring Agent] Nausea and Vomiting    Zyrtec [Cetirizine] Other (comments)     Dizziness, blurred vision and disoriented     Current Outpatient Medications   Medication Sig Dispense Refill    cloNIDine (CATAPRES) 0.1 mg/24 hr ptwk 1 Patch by TransDERmal route every seven (7) days. 4 Patch 1    PARoxetine (PAXIL) 30 mg tablet Take 1 tab daily x7 days, 1 tab every 2 days x7 days, 1 tab every 3 days x7 days then discontinue 30 Tab 0    ondansetron (ZOFRAN ODT) 8 mg disintegrating tablet Take 1 Tab by mouth every twelve (12) hours as needed for Nausea. 20 Tab 0    acetaminophen (TYLENOL EXTRA STRENGTH) 500 mg tablet Take  by mouth every six (6) hours as needed for Pain.  EPINEPHrine (EPIPEN) 0.3 mg/0.3 mL injection 0.3 mL by IntraMUSCular route once as needed for up to 1 dose. 1 Syringe 0    butalbital-acetaminophen-caffeine (FIORICET, ESGIC) -40 mg per tablet       albuterol (PROVENTIL HFA, VENTOLIN HFA, PROAIR HFA) 90 mcg/actuation inhaler Take 2 Puffs by inhalation every four (4) hours as needed for Wheezing or Shortness of Breath. 1 Inhaler 6    SUMAtriptan (IMITREX) 25 mg tablet Take 2 tabs by mouth at onset of headache. Then 1 tab by mouth as directed.  30 Tab 0    albuterol (PROVENTIL VENTOLIN) 2.5 mg /3 mL (0.083 %) nebulizer solution 3 mL by Nebulization route every four (4) hours as needed for Wheezing or Shortness of Breath. 2 Package 3    Nebulizer & Compressor machine 1 Each by Does Not Apply route every four (4) hours as needed. 1 Each 0    Nebulizer Accessories (NEBULIZER) Kit by Does Not Apply route. Past Medical History:   Diagnosis Date    Allergic rhinitis 7/16/2010    Asthma     Chronic shoulder pain 7/16/2010    HTN (hypertension) 7/16/2010    Hyperlipemia 8/26/6296    Lichen planus 7/0/3184    Obesity 7/16/2010     Social History     Socioeconomic History    Marital status:      Spouse name: Not on file    Number of children: Not on file    Years of education: Not on file    Highest education level: Not on file   Social Needs    Financial resource strain: Not on file    Food insecurity - worry: Not on file    Food insecurity - inability: Not on file   [employer] needs - medical: Not on file   [employer] needs - non-medical: Not on file   Occupational History    Not on file   Tobacco Use    Smoking status: Never Smoker    Smokeless tobacco: Never Used   Substance and Sexual Activity    Alcohol use: No    Drug use: No    Sexual activity: Yes     Partners: Male     Birth control/protection: Surgical     Comment: btl   Other Topics Concern    Not on file   Social History Narrative    Not on file     Review of Systems   Constitutional: Negative for chills and fever. Respiratory: Negative for cough and shortness of breath. Cardiovascular: Negative for chest pain and palpitations. Gastrointestinal: Negative for abdominal pain. Skin:        Right leg nodule, noticed several days ago   Neurological: Positive for dizziness. Psychiatric/Behavioral: Negative for depression. The patient is not nervous/anxious and does not have insomnia.     /78 (BP 1 Location: Right arm, BP Patient Position: Sitting)   Pulse 85   Temp 98.2 °F (36.8 °C) (Oral)   Resp 16   Ht 5' 2\" (1.575 m)   Wt 157 lb 9.6 oz (71.5 kg)   LMP 05/16/2014   SpO2 98%   BMI 28.83 kg/m²   Physical Exam   Constitutional: She appears well-developed and well-nourished. No distress. HENT:   Head: Normocephalic and atraumatic. Right Ear: Tympanic membrane, external ear and ear canal normal.   Left Ear: Tympanic membrane, external ear and ear canal normal.   Nose: Mucosal edema present. Right sinus exhibits no maxillary sinus tenderness and no frontal sinus tenderness. Left sinus exhibits no maxillary sinus tenderness and no frontal sinus tenderness. Mouth/Throat: Uvula is midline, oropharynx is clear and moist and mucous membranes are normal.   Neck: Normal range of motion. Neck supple. Cardiovascular: Normal rate, regular rhythm and normal heart sounds. Exam reveals no gallop and no friction rub. No murmur heard. Pulmonary/Chest: Effort normal and breath sounds normal. She has no wheezes. She has no rhonchi. She has no rales. Musculoskeletal: She exhibits no edema. Lymphadenopathy:     She has no cervical adenopathy. Skin: Skin is warm and dry. ASSESSMENT and PLAN    ICD-10-CM ICD-9-CM    1. Allergic rhinitis, unspecified seasonality, unspecified trigger J30.9 477.9 REFERRAL TO ENT-OTOLARYNGOLOGY      CANCELED: REFERRAL TO ENT-OTOLARYNGOLOGY   2. Essential hypertension I10 401.9    3. 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 Lower leg mass, right R22.41 782.2 US EXT NONVAS RT LTD   5. Dizziness R42 780.4 REFERRAL TO ENT-OTOLARYNGOLOGY   requesting to have ultrasound performed at [facility] and requesting referral to [facility] ENT provider  Orders Placed This Encounter    US EXT NONVAS RT LTD    REFERRAL TO ENT-OTOLARYNGOLOGY    PARoxetine (PAXIL) 30 mg tablet    PARoxetine (PAXIL) 20 mg tablet   informed will adjust taper. Will resume taking paxil 30 daily for 21 days, then 20 mg daily for 21 days.   Further instructions regarding taper will be discussed during follow up visit  I have discussed the diagnosis with the patient and the intended plan as seen in the above orders. The patient has received an after-visit summary and questions were answered concerning future plans. I have discussed medication side effects and warnings with the patient as well. Patient agreeable with above plan and verbalizes understanding. Follow-up Disposition:  Return in about 4 weeks (around 1/7/2019) for medication side effects .

## 2019-01-07 ENCOUNTER — OFFICE VISIT (OUTPATIENT)
Dept: FAMILY MEDICINE CLINIC | Age: 54
End: 2019-01-07

## 2019-01-07 VITALS
WEIGHT: 163.6 LBS | SYSTOLIC BLOOD PRESSURE: 131 MMHG | HEART RATE: 76 BPM | DIASTOLIC BLOOD PRESSURE: 85 MMHG | OXYGEN SATURATION: 99 % | RESPIRATION RATE: 18 BRPM | TEMPERATURE: 97.9 F | BODY MASS INDEX: 30.11 KG/M2 | HEIGHT: 62 IN

## 2019-01-07 DIAGNOSIS — B34.9 ACUTE VIRAL SYNDROME: ICD-10-CM

## 2019-01-07 DIAGNOSIS — N62 MACROMASTIA: ICD-10-CM

## 2019-01-07 DIAGNOSIS — F19.930: Primary | ICD-10-CM

## 2019-01-07 DIAGNOSIS — G43.009 NONINTRACTABLE MIGRAINE, UNSPECIFIED MIGRAINE TYPE: ICD-10-CM

## 2019-01-07 DIAGNOSIS — T43.205A: Primary | ICD-10-CM

## 2019-01-07 RX ORDER — MONTELUKAST SODIUM 5 MG/1
10 TABLET, CHEWABLE ORAL
Qty: 60 TAB | Refills: 2 | Status: SHIPPED | OUTPATIENT
Start: 2019-01-07 | End: 2019-02-19 | Stop reason: SDUPTHER

## 2019-01-07 RX ORDER — SUMATRIPTAN 50 MG/1
TABLET, FILM COATED ORAL
Qty: 30 TAB | Refills: 0 | Status: SHIPPED | OUTPATIENT
Start: 2019-01-07 | End: 2019-04-26 | Stop reason: SDUPTHER

## 2019-01-07 RX ORDER — PAROXETINE 10 MG/1
TABLET, FILM COATED ORAL
Qty: 60 TAB | Refills: 0 | Status: SHIPPED | OUTPATIENT
Start: 2019-01-07 | End: 2019-02-19 | Stop reason: SDUPTHER

## 2019-01-07 RX ORDER — ONDANSETRON 8 MG/1
8 TABLET, ORALLY DISINTEGRATING ORAL
Qty: 20 TAB | Refills: 0 | Status: SHIPPED | OUTPATIENT
Start: 2019-01-07 | End: 2019-02-19 | Stop reason: SDUPTHER

## 2019-01-07 NOTE — PROGRESS NOTES
HISTORY OF PRESENT ILLNESS  Torrey Patel is a 52 y.o. female. Patient presents for follow up visit Paxil taper adjustment. Patient's paxil was increased to 30 mg x21 days, then 20 mg x21 days. Patient reports she continues to have dizziness states this is unchanged. Patient does not have an appt scheduled with ENT, will follow up today  States she also states for the last week cold like symptoms. Sore throat and increased dizziness. States her daughters had the same thing. rpeorts she has been using cough drops and drinking herbal tea. States she has had back soreness. Has a history of back pain, upper. States she would like a referral to have breast reduction. Allergies   Allergen Reactions    [place] Anaphylaxis    Cherry Anaphylaxis    Asa-Acetaminophen-Caff-Potass Hives    [place] Other (comments)     syncope    [place] Concentrate [Flavoring Agent] Nausea and Vomiting    Zyrtec [Cetirizine] Other (comments)     Dizziness, blurred vision and disoriented     Current Outpatient Medications   Medication Sig Dispense Refill    PARoxetine (PAXIL) 20 mg tablet Take 1 Tab by mouth daily for 21 days. 30 Tab 0    cloNIDine (CATAPRES) 0.1 mg/24 hr ptwk 1 Patch by TransDERmal route every seven (7) days. 4 Patch 1    ondansetron (ZOFRAN ODT) 8 mg disintegrating tablet Take 1 Tab by mouth every twelve (12) hours as needed for Nausea. 20 Tab 0    EPINEPHrine (EPIPEN) 0.3 mg/0.3 mL injection 0.3 mL by IntraMUSCular route once as needed for up to 1 dose. 1 Syringe 0    butalbital-acetaminophen-caffeine (FIORICET, ESGIC) -40 mg per tablet       albuterol (PROVENTIL HFA, VENTOLIN HFA, PROAIR HFA) 90 mcg/actuation inhaler Take 2 Puffs by inhalation every four (4) hours as needed for Wheezing or Shortness of Breath. 1 Inhaler 6    SUMAtriptan (IMITREX) 25 mg tablet Take 2 tabs by mouth at onset of headache. Then 1 tab by mouth as directed.  30 Tab 0    albuterol (PROVENTIL VENTOLIN) 2.5 mg /3 mL (0.083 %) nebulizer solution 3 mL by Nebulization route every four (4) hours as needed for Wheezing or Shortness of Breath. 2 Package 3    Nebulizer & Compressor machine 1 Each by Does Not Apply route every four (4) hours as needed. 1 Each 0    Nebulizer Accessories (NEBULIZER) Kit by Does Not Apply route.  acetaminophen (TYLENOL EXTRA STRENGTH) 500 mg tablet Take  by mouth every six (6) hours as needed for Pain. Past Medical History:   Diagnosis Date    Allergic rhinitis 7/16/2010    Asthma     Chronic shoulder pain 7/16/2010    HTN (hypertension) 7/16/2010    Hyperlipemia 6/67/6974    Lichen planus 8/6/2590    Obesity 7/16/2010     Social History     Socioeconomic History    Marital status:      Spouse name: Not on file    Number of children: Not on file    Years of education: Not on file    Highest education level: Not on file   Social Needs    Financial resource strain: Not on file    Food insecurity - worry: Not on file    Food insecurity - inability: Not on file   [employer]NET [employer] needs - medical: Not on file   INTERNET BUSINESS TRADER needs - non-medical: Not on file   Occupational History    Not on file   Tobacco Use    Smoking status: Never Smoker    Smokeless tobacco: Never Used   Substance and Sexual Activity    Alcohol use: No    Drug use: No    Sexual activity: Yes     Partners: Male     Birth control/protection: Surgical     Comment: btl   Other Topics Concern    Not on file   Social History Narrative    Not on file     Review of Systems   Constitutional: Negative for chills and fever. HENT: Positive for congestion and sore throat (mild). Negative for ear pain and sinus pain. Respiratory: Positive for cough (mild). Negative for shortness of breath and wheezing. Cardiovascular: Negative for chest pain and palpitations. Neurological: Positive for dizziness (unchanged) and headaches (unchanged ). Negative for focal weakness.      /85 (BP 1 Location: Left arm, BP Patient Position: Sitting)   Pulse 76   Temp 97.9 °F (36.6 °C) (Oral)   Resp 18   Ht 5' 2\" (1.575 m)   Wt 163 lb 9.6 oz (74.2 kg)   LMP 05/16/2014   SpO2 99%   BMI 29.92 kg/m²   Physical Exam   Constitutional: She is oriented to person, place, and time. She appears well-developed and well-nourished. No distress. HENT:   Head: Normocephalic and atraumatic. Right Ear: Tympanic membrane is not erythematous. A middle ear effusion is present. Left Ear: Tympanic membrane is not erythematous. A middle ear effusion is present. Nose: Mucosal edema present. Right sinus exhibits no maxillary sinus tenderness and no frontal sinus tenderness. Left sinus exhibits no maxillary sinus tenderness and no frontal sinus tenderness. Mouth/Throat: Uvula is midline, oropharynx is clear and moist and mucous membranes are normal.   Cobblestoning to post oropharynx   Neck: Normal range of motion. Neck supple. Cardiovascular: Normal rate, regular rhythm and normal heart sounds. Exam reveals no gallop and no friction rub. No murmur heard. Pulmonary/Chest: Effort normal and breath sounds normal. She has no wheezes. She has no rhonchi. She has no rales. Musculoskeletal: She exhibits no edema. Thoracic back: She exhibits tenderness (mild ). She exhibits no swelling and no spasm. Lymphadenopathy:     She has no cervical adenopathy. Neurological: She is alert and oriented to person, place, and time. She has normal reflexes. She displays normal reflexes. Coordination normal.     ASSESSMENT and PLAN    ICD-10-CM ICD-9-CM    1. 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 Nonintractable migraine, unspecified migraine type G43.009 346.10 REFERRAL TO NEUROLOGY      SUMAtriptan (IMITREX) 50 mg tablet   3.  Macromastia N62 611.1 REFERRAL TO PLASTIC SURGERY   4. Acute viral syndrome B34.9 079.99      Orders Placed This Encounter    REFERRAL TO NEUROLOGY     Referral Priority: Routine     Referral Type:   Consultation     Referral Reason:   Specialty Services Required     Requested Specialty:   Neurology     Number of Visits Requested:   1    REFERRAL TO PLASTIC SURGERY     Referral Priority:   Routine     Referral Type:   Consultation     Referral Reason:   Specialty Services Required     Number of Visits Requested:   1    ondansetron (ZOFRAN ODT) 8 mg disintegrating tablet     Sig: Take 1 Tab by mouth every twelve (12) hours as needed for Nausea. Dispense:  20 Tab     Refill:  0    SUMAtriptan (IMITREX) 50 mg tablet     Sig: Take 1 tabs by mouth at onset of headache. Then may repeat 1 tab in 2 hrs if needed     Dispense:  30 Tab     Refill:  0    PARoxetine (PAXIL) 10 mg tablet     Sig: Take 1 tab daily x21 days after completion of Paxil 20mg. Then on day 22 began taking 1 tab every other day x 21 days     Dispense:  60 Tab     Refill:  0    montelukast (SINGULAIR) 5 mg chewable tablet     Sig: Take 2 Tabs by mouth nightly. Dispense:  60 Tab     Refill:  2   alarm signs when to seek emergent care provided and reviewed   will continue with paxil taper. Dizziness ? R/t eustachian tube dysfunction  discussed referral process. Advised referrals will be submitted to [facility]. Instructed to call [facility] to check status of referrals in approx. 7-10 business days. I have discussed the diagnosis with the patient and the intended plan as seen in the above orders. The patient has received an after-visit summary and questions were answered concerning future plans. I have discussed medication side effects and warnings with the patient as well. Patient agreeable with above plan and verbalizes understanding. Follow-up Disposition:  Return in about 6 weeks (around 2/18/2019) for paxil taper.

## 2019-01-07 NOTE — PATIENT INSTRUCTIONS
Viral upper respiratory infection (URI or \"common cold\")   - Seek medical care if symptoms become more severe or if you develop      chest pain, shortness of breath, confusion or fever > 103 degrees. - Contact us if your symptoms fail to improve after 7-10 days   - Rest as much as possible and stay home from work/school at least 24 hours                  after last fever. Wash hand frequently and cough/sneeze into your sleeve to help prevent       infection of others. Drink plenty of fluids. - Ibuprofen (Advil, Motrin) 400-800mg every 6 hours or Aleve 220 mg 1-2 pills every 8 hours                 for fever, headache, pain   - Tylenol extra strength 500 mg every 6 hours for pain, headache, fever   - Nasal saline rinses 2-3 times daily for nasal congestion   - Mucinex 1200 mg twice daily or Guaifenesin 400 mg every 4 hours for chest congestion              - Robitussin DM or Delsym for cough   - Cepacol throat losenges and saline gargles (1 tsp salt in 8 oz water) for sore throat   - Tea with honey for cough              - Benadryl (diphenhydramine) 50 mg at night for nasal congestion/allergies   - Pseudophedrine 12-hour tablets twice daily for nasal and inner ear congestion. Ask your         Pharmacist. Use with caution if you have high blood pressure   - Afrin (oxymetazoline) nasal spray 2 sprays in each nostril twice daily for severe      congestion. Do not use this medication for more than 7 days as it may cause        \"rebound congestion. Use with caution if your blood pressure is uncontrolled.

## 2019-01-07 NOTE — PROGRESS NOTES
Chief Complaint   Patient presents with    Medication Reaction     Patient states Paxil makes her feel Dizzy. 1. Have you been to the ER, urgent care clinic since your last visit? Hospitalized since your last visit? No    2. Have you seen or consulted any other health care providers outside of the [address] [place] [address] since your last visit? Include any pap smears or colon screening.  No

## 2019-02-19 ENCOUNTER — OFFICE VISIT (OUTPATIENT)
Dept: FAMILY MEDICINE CLINIC | Age: 54
End: 2019-02-19

## 2019-02-19 VITALS
SYSTOLIC BLOOD PRESSURE: 140 MMHG | HEIGHT: 62 IN | DIASTOLIC BLOOD PRESSURE: 87 MMHG | WEIGHT: 163.4 LBS | OXYGEN SATURATION: 98 % | TEMPERATURE: 98.6 F | RESPIRATION RATE: 16 BRPM | BODY MASS INDEX: 30.07 KG/M2 | HEART RATE: 85 BPM

## 2019-02-19 DIAGNOSIS — T43.205A: ICD-10-CM

## 2019-02-19 DIAGNOSIS — E78.2 MIXED HYPERLIPIDEMIA: ICD-10-CM

## 2019-02-19 DIAGNOSIS — I10 ESSENTIAL HYPERTENSION: ICD-10-CM

## 2019-02-19 DIAGNOSIS — F19.930: ICD-10-CM

## 2019-02-19 DIAGNOSIS — G43.009 NONINTRACTABLE MIGRAINE, UNSPECIFIED MIGRAINE TYPE: Primary | ICD-10-CM

## 2019-02-19 RX ORDER — LORATADINE 10 MG
10 TABLET,DISINTEGRATING ORAL DAILY
Qty: 30 TAB | Refills: 2 | Status: SHIPPED | OUTPATIENT
Start: 2019-02-19 | End: 2019-04-26 | Stop reason: SDUPTHER

## 2019-02-19 RX ORDER — ONDANSETRON 8 MG/1
8 TABLET, ORALLY DISINTEGRATING ORAL
Qty: 20 TAB | Refills: 0 | Status: SHIPPED | OUTPATIENT
Start: 2019-02-19 | End: 2019-07-03 | Stop reason: SDUPTHER

## 2019-02-19 RX ORDER — MONTELUKAST SODIUM 5 MG/1
10 TABLET, CHEWABLE ORAL
Qty: 60 TAB | Refills: 2 | Status: SHIPPED | OUTPATIENT
Start: 2019-02-19 | End: 2019-04-16 | Stop reason: SDUPTHER

## 2019-02-19 RX ORDER — CLONIDINE 0.1 MG/24H
1 PATCH, EXTENDED RELEASE TRANSDERMAL
Qty: 4 PATCH | Refills: 1 | Status: SHIPPED | OUTPATIENT
Start: 2019-02-19 | End: 2019-04-26 | Stop reason: SDUPTHER

## 2019-02-19 RX ORDER — PAROXETINE 10 MG/1
10 TABLET, FILM COATED ORAL DAILY
Qty: 30 TAB | Refills: 3 | Status: SHIPPED | OUTPATIENT
Start: 2019-02-19 | End: 2019-08-20 | Stop reason: SDUPTHER

## 2019-02-19 NOTE — PROGRESS NOTES
Pt is here for f/u on tapering off paxil. Pt taking 1 tab 10mg every over day. Pt c/o headache & vomiting. 1. Have you been to the ER, urgent care clinic since your last visit? Hospitalized since your last visit? No    2. Have you seen or consulted any other health care providers outside of the [address] [place] [address] since your last visit? Include any pap smears or colon screening.  No

## 2019-02-19 NOTE — PATIENT INSTRUCTIONS
Migraine Headache: Care Instructions  Your Care Instructions  Migraines are painful, throbbing headaches that often start on one side of the head. They may cause nausea and vomiting and make you sensitive to light, sound, or smell. Without treatment, migraines can last from 4 hours to a few days. Medicines can help prevent migraines or stop them after they have started. Your doctor can help you find which ones work best for you. Follow-up care is a key part of your treatment and safety. Be sure to make and go to all appointments, and call your doctor if you are having problems. It's also a good idea to know your test results and keep a list of the medicines you take. How can you care for yourself at home? · Do not drive if you have taken a prescription pain medicine. · Rest in a quiet, dark room until your headache is gone. Close your eyes, and try to relax or go to sleep. Don't watch TV or read. · Put a cold, moist cloth or cold pack on the painful area for 10 to 20 minutes at a time. Put a thin cloth between the cold pack and your skin. · Use a warm, moist towel or a heating pad set on low to relax tight shoulder and neck muscles. · Have someone gently massage your neck and shoulders. · Take your medicines exactly as prescribed. Call your doctor if you think you are having a problem with your medicine. You will get more details on the specific medicines your doctor prescribes. · Be careful not to take pain medicine more often than the instructions allow. You could get worse or more frequent headaches when the medicine wears off. To prevent migraines  · Keep a headache diary so you can figure out what triggers your headaches. Avoiding triggers may help you prevent headaches. Record when each headache began, how long it lasted, and what the pain was like.  (Was it throbbing, aching, stabbing, or dull?) Write down any other symptoms you had with the headache, such as nausea, flashing lights or dark spots, or sensitivity to bright light or loud noise. Note if the headache occurred near your period. List anything that might have triggered the headache. Triggers may include certain foods (chocolate, cheese, wine) or odors, smoke, bright light, stress, or lack of sleep. · If your doctor has prescribed medicine for your migraines, take it as directed. You may have medicine that you take only when you get a migraine and medicine that you take all the time to help prevent migraines. ? If your doctor has prescribed medicine for when you get a headache, take it at the first sign of a migraine, unless your doctor has given you other instructions. ? If your doctor has prescribed medicine to prevent migraines, take it exactly as prescribed. Call your doctor if you think you are having a problem with your medicine. · Find healthy ways to deal with stress. Migraines are most common during or right after stressful times. Take time to relax before and after you do something that has caused a migraine in the past.  · Try to keep your muscles relaxed by keeping good posture. Check your jaw, face, neck, and shoulder muscles for tension. Try to relax them. When you sit at a desk, change positions often. And make sure to stretch for 30 seconds each hour. · Get plenty of sleep and exercise. · Eat meals on a regular schedule. Avoid foods and drinks that often trigger migraines. These include chocolate, alcohol (especially red wine and port), aspartame, monosodium glutamate (MSG), and some additives found in foods (such as hot dogs, mccullough, cold cuts, aged cheeses, and pickled foods). · Limit caffeine. Don't drink too much coffee, tea, or soda. But don't quit caffeine suddenly. That can also give you migraines. · Do not smoke or allow others to smoke around you. If you need help quitting, talk to your doctor about stop-smoking programs and medicines. These can increase your chances of quitting for good.   · If you are taking birth control pills or hormone therapy, talk to your doctor about whether they are triggering your migraines. When should you call for help? Call 911 anytime you think you may need emergency care. For example, call if:    · You have signs of a stroke. These may include:  ? Sudden numbness, paralysis, or weakness in your face, arm, or leg, especially on only one side of your body. ? Sudden vision changes. ? Sudden trouble speaking. ? Sudden confusion or trouble understanding simple statements. ? Sudden problems with walking or balance. ? A sudden, severe headache that is different from past headaches.    Call your doctor now or seek immediate medical care if:    · You have new or worse nausea and vomiting.     · You have a new or higher fever.     · Your headache gets much worse.    Watch closely for changes in your health, and be sure to contact your doctor if:    · You are not getting better after 2 days (48 hours). Where can you learn more? Go to http://digna-markel.info/. Enter J172 in the search box to learn more about \"Migraine Headache: Care Instructions. \"  Current as of: Becky 3, 2018  Content Version: 11.9  © 3126-1555 [employer]wise, Incorporated. Care instructions adapted under license by [employer] (which disclaims liability or warranty for this information). If you have questions about a medical condition or this instruction, always ask your healthcare professional. [address] any warranty or liability for your use of this information.

## 2019-02-19 NOTE — PROGRESS NOTES
HISTORY OF PRESENT ILLNESS  Hiram De Oliveira is a 52 y.o. female presents today to follow up on paxil taper. HPI   Patient reports she is having headaches. Comments yesterday she had abd cramping and vomiting yesterday x1 episode. States she was able to hold down popcorn. Reports she currently does have headache. Patient reports with her paxil taper she has noticed on the days she's not scheduled to take the paxil is when her headaches are present. Awaiting to hear from neurology. Comments over the last months her taste has been off. Comments she has had increased congestion. Per patient she has not been contacted by ENT at [facility].  Allergies   Allergen Reactions    [place] Anaphylaxis   [place] Anaphylaxis    Asa-Acetaminophen-Caff-Potass Hives    [place] Other (comments)     syncope    [place] Concentrate [Flavoring Agent] Nausea and Vomiting    Zyrtec [Cetirizine] Other (comments)     Dizziness, blurred vision and disoriented     Current Outpatient Medications   Medication Sig Dispense Refill    ondansetron (ZOFRAN ODT) 8 mg disintegrating tablet Take 1 Tab by mouth every twelve (12) hours as needed for Nausea. 20 Tab 0    SUMAtriptan (IMITREX) 50 mg tablet Take 1 tabs by mouth at onset of headache. Then may repeat 1 tab in 2 hrs if needed 30 Tab 0    PARoxetine (PAXIL) 10 mg tablet Take 1 tab daily x21 days after completion of Paxil 20mg. Then on day 22 began taking 1 tab every other day x 21 days 60 Tab 0    montelukast (SINGULAIR) 5 mg chewable tablet Take 2 Tabs by mouth nightly. 60 Tab 2    cloNIDine (CATAPRES) 0.1 mg/24 hr ptwk 1 Patch by TransDERmal route every seven (7) days. 4 Patch 1    acetaminophen (TYLENOL EXTRA STRENGTH) 500 mg tablet Take  by mouth every six (6) hours as needed for Pain.  EPINEPHrine (EPIPEN) 0.3 mg/0.3 mL injection 0.3 mL by IntraMUSCular route once as needed for up to 1 dose.  1 Syringe 0    butalbital-acetaminophen-caffeine (Andrew Lloyd) -40 mg per tablet       albuterol (PROVENTIL HFA, VENTOLIN HFA, PROAIR HFA) 90 mcg/actuation inhaler Take 2 Puffs by inhalation every four (4) hours as needed for Wheezing or Shortness of Breath. 1 Inhaler 6    albuterol (PROVENTIL VENTOLIN) 2.5 mg /3 mL (0.083 %) nebulizer solution 3 mL by Nebulization route every four (4) hours as needed for Wheezing or Shortness of Breath. 2 Package 3    Nebulizer & Compressor machine 1 Each by Does Not Apply route every four (4) hours as needed. 1 Each 0    Nebulizer Accessories (NEBULIZER) Kit by Does Not Apply route. Past Medical History:   Diagnosis Date    Allergic rhinitis 7/16/2010    Asthma     Chronic shoulder pain 7/16/2010    HTN (hypertension) 7/16/2010    Hyperlipemia 5/44/2747    Lichen planus 1/4/7687    Obesity 7/16/2010     Social History     Socioeconomic History    Marital status:      Spouse name: Not on file    Number of children: Not on file    Years of education: Not on file    Highest education level: Not on file   Social Needs    Financial resource strain: Not on file    Food insecurity - worry: Not on file    Food insecurity - inability: Not on file   Guestmob needs - medical: Not on file   [employer] needs - non-medical: Not on file   Occupational History    Not on file   Tobacco Use    Smoking status: Never Smoker    Smokeless tobacco: Never Used   Substance and Sexual Activity    Alcohol use: No    Drug use: No    Sexual activity: Yes     Partners: Male     Birth control/protection: Surgical     Comment: btl   Other Topics Concern    Not on file   Social History Narrative    Not on file     Review of Systems   Constitutional: Negative for chills and fever. Gastrointestinal: Positive for nausea. Negative for vomiting. Neurological: Positive for headaches. Negative for dizziness.      /87 (BP 1 Location: Left arm)   Pulse 85   Temp 98.6 °F (37 °C) (Oral)   Resp 16   Ht 5' 2\" (1.575 m)   Wt 163 lb 6.4 oz (74.1 kg)   LMP 05/16/2014   SpO2 98%   BMI 29.89 kg/m²   Physical Exam   Constitutional: She appears well-developed and well-nourished. HENT:   Head: Normocephalic and atraumatic. Neck: Normal range of motion. Neck supple. Cardiovascular: Normal rate, regular rhythm and normal heart sounds. Exam reveals no gallop and no friction rub. No murmur heard. Pulmonary/Chest: Effort normal and breath sounds normal. She has no wheezes. She has no rhonchi. She has no rales. Musculoskeletal: She exhibits no edema. Skin: Skin is warm and dry. Psychiatric: She has a normal mood and affect. Her behavior is normal. Judgment and thought content normal.       ASSESSMENT and PLAN    ICD-10-CM ICD-9-CM    1. Nonintractable migraine, unspecified migraine type G43.009 346.10    2. Essential hypertension I10 401.9    3. Mixed hyperlipidemia E78.2 272.2    4. 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 Continue paxil at 10 mg daily until seen by neurology  Instructed to take claritin in the morning and singulair at night to help with allergy symptoms. I have discussed the diagnosis with the patient and the intended plan as seen in the above orders. The patient has received an after-visit summary and questions were answered concerning future plans. I have discussed medication side effects and warnings with the patient as well. Patient agreeable with above plan and verbalizes understanding. Follow-up Disposition:  Return in about 3 months (around 5/19/2019) for HTN.

## 2019-04-02 ENCOUNTER — HOSPITAL ENCOUNTER (OUTPATIENT)
Dept: MAMMOGRAPHY | Age: 54
Discharge: HOME | End: 2019-04-02
Attending: NURSE PRACTITIONER
Payer: OTHER GOVERNMENT

## 2019-04-02 DIAGNOSIS — Z12.31 VISIT FOR SCREENING MAMMOGRAM: ICD-10-CM

## 2019-04-02 PROCEDURE — 77067 SCR MAMMO BI INCL CAD: CPT

## 2019-04-12 DIAGNOSIS — J45.909 ASTHMA: ICD-10-CM

## 2019-04-12 DIAGNOSIS — J45.20 MILD INTERMITTENT ASTHMA WITHOUT COMPLICATION: Primary | ICD-10-CM

## 2019-04-12 RX ORDER — FLUTICASONE PROPIONATE AND SALMETEROL 250; 50 UG/1; UG/1
1 POWDER RESPIRATORY (INHALATION) 2 TIMES DAILY
Qty: 1 INHALER | Refills: 3 | OUTPATIENT
Start: 2019-04-12

## 2019-04-12 RX ORDER — MONTELUKAST SODIUM 10 MG/1
10 TABLET ORAL DAILY
Qty: 90 TAB | Refills: 3 | OUTPATIENT
Start: 2019-04-12

## 2019-04-12 RX ORDER — EPINEPHRINE 0.3 MG/.3ML
0.3 INJECTION SUBCUTANEOUS
Qty: 1 SYRINGE | Refills: 0 | OUTPATIENT
Start: 2019-04-12 | End: 2019-04-12

## 2019-04-12 RX ORDER — ALBUTEROL SULFATE 90 UG/1
2 AEROSOL, METERED RESPIRATORY (INHALATION)
Qty: 1 INHALER | Refills: 6 | OUTPATIENT
Start: 2019-04-12

## 2019-04-12 RX ORDER — ALBUTEROL SULFATE 0.83 MG/ML
2.5 SOLUTION RESPIRATORY (INHALATION)
Qty: 2 PACKAGE | Refills: 3 | OUTPATIENT
Start: 2019-04-12

## 2019-04-16 DIAGNOSIS — J45.20 MILD INTERMITTENT ASTHMA WITHOUT COMPLICATION: ICD-10-CM

## 2019-04-16 RX ORDER — EPINEPHRINE 0.3 MG/.3ML
0.3 INJECTION SUBCUTANEOUS
Qty: 1 SYRINGE | Refills: 0 | Status: SHIPPED | OUTPATIENT
Start: 2019-04-16 | End: 2019-04-26 | Stop reason: SDUPTHER

## 2019-04-16 RX ORDER — ALBUTEROL SULFATE 90 UG/1
2 AEROSOL, METERED RESPIRATORY (INHALATION)
Qty: 1 INHALER | Refills: 1 | Status: CANCELLED | OUTPATIENT
Start: 2019-04-16

## 2019-04-16 RX ORDER — ALBUTEROL SULFATE 0.83 MG/ML
2.5 SOLUTION RESPIRATORY (INHALATION)
Qty: 2 PACKAGE | Refills: 3 | Status: SHIPPED | OUTPATIENT
Start: 2019-04-16 | End: 2020-10-15 | Stop reason: SDUPTHER

## 2019-04-16 RX ORDER — ALBUTEROL SULFATE 0.83 MG/ML
2.5 SOLUTION RESPIRATORY (INHALATION)
Qty: 2 PACKAGE | Refills: 3 | Status: CANCELLED | OUTPATIENT
Start: 2019-04-16

## 2019-04-16 RX ORDER — MONTELUKAST SODIUM 5 MG/1
10 TABLET, CHEWABLE ORAL
Qty: 60 TAB | Refills: 2 | Status: SHIPPED | OUTPATIENT
Start: 2019-04-16 | End: 2019-10-29 | Stop reason: SDUPTHER

## 2019-04-16 RX ORDER — MONTELUKAST SODIUM 5 MG/1
10 TABLET, CHEWABLE ORAL
Qty: 60 TAB | Refills: 2 | Status: CANCELLED | OUTPATIENT
Start: 2019-04-16

## 2019-04-16 RX ORDER — ALBUTEROL SULFATE 90 UG/1
2 AEROSOL, METERED RESPIRATORY (INHALATION)
Qty: 1 INHALER | Refills: 6 | Status: SHIPPED | OUTPATIENT
Start: 2019-04-16 | End: 2020-10-15 | Stop reason: SDUPTHER

## 2019-04-16 RX ORDER — EPINEPHRINE 0.3 MG/.3ML
0.3 INJECTION SUBCUTANEOUS
Qty: 1 SYRINGE | Refills: 0 | Status: CANCELLED | OUTPATIENT
Start: 2019-04-16 | End: 2019-04-16

## 2019-04-16 RX ORDER — FLUTICASONE PROPIONATE AND SALMETEROL 250; 50 UG/1; UG/1
1 POWDER RESPIRATORY (INHALATION) EVERY 12 HOURS
Qty: 1 INHALER | Refills: 2 | Status: CANCELLED | OUTPATIENT
Start: 2019-04-16

## 2019-04-16 NOTE — TELEPHONE ENCOUNTER
Last OV 2/19/19  Last Refill 2/19/19-singular                   7/10/17 Advair                    7/10/17 Albuterol                   3/28/18 Epipen
Patient is also requesting refill on Advair Diskus inhaler that was last written in 2016 by marcellus. Also would like a refill for Epipen, hers is . Patient also said that she usually takes Claritin but it is not working so she needs to switch it up and requesting a new rx with a decongestant.      Patient has apt scheduled for 19
Pt is calling to check on the status of her medication. PT called this in on Friday and is having a hard time with all the pollen pt is asking for this to be done soon and for the nurse to call her once it is sent over. Please Advise.       [phone]
above

## 2019-04-26 ENCOUNTER — HOSPITAL ENCOUNTER (OUTPATIENT)
Dept: LAB | Age: 54
Discharge: HOME | End: 2019-04-26
Payer: OTHER GOVERNMENT

## 2019-04-26 ENCOUNTER — OFFICE VISIT (OUTPATIENT)
Dept: FAMILY MEDICINE CLINIC | Age: 54
End: 2019-04-26

## 2019-04-26 VITALS
OXYGEN SATURATION: 97 % | HEART RATE: 92 BPM | BODY MASS INDEX: 31.15 KG/M2 | HEIGHT: 61 IN | WEIGHT: 165 LBS | RESPIRATION RATE: 16 BRPM | SYSTOLIC BLOOD PRESSURE: 136 MMHG | TEMPERATURE: 98 F | DIASTOLIC BLOOD PRESSURE: 94 MMHG

## 2019-04-26 DIAGNOSIS — Z00.00 WELL WOMAN EXAM (NO GYNECOLOGICAL EXAM): Primary | ICD-10-CM

## 2019-04-26 DIAGNOSIS — I10 ESSENTIAL HYPERTENSION: ICD-10-CM

## 2019-04-26 DIAGNOSIS — E78.2 MIXED HYPERLIPIDEMIA: ICD-10-CM

## 2019-04-26 DIAGNOSIS — J45.20 MILD INTERMITTENT ASTHMA WITHOUT COMPLICATION: ICD-10-CM

## 2019-04-26 DIAGNOSIS — G43.009 NONINTRACTABLE MIGRAINE, UNSPECIFIED MIGRAINE TYPE: ICD-10-CM

## 2019-04-26 LAB
ALBUMIN SERPL-MCNC: 4 G/DL (ref 3.4–5)
ALBUMIN/GLOB SERPL: 1.2 {RATIO} (ref 0.8–1.7)
ALP SERPL-CCNC: 196 U/L (ref 45–117)
ALT SERPL-CCNC: 20 U/L (ref 13–56)
ANION GAP SERPL CALC-SCNC: 7 MMOL/L (ref 3–18)
AST SERPL-CCNC: 16 U/L (ref 15–37)
BILIRUB SERPL-MCNC: 0.4 MG/DL (ref 0.2–1)
BUN SERPL-MCNC: 9 MG/DL (ref 7–18)
BUN/CREAT SERPL: 13 (ref 12–20)
CALCIUM SERPL-MCNC: 9.3 MG/DL (ref 8.5–10.1)
CHLORIDE SERPL-SCNC: 105 MMOL/L (ref 100–108)
CHOLEST SERPL-MCNC: 305 MG/DL
CO2 SERPL-SCNC: 29 MMOL/L (ref 21–32)
CREAT SERPL-MCNC: 0.69 MG/DL (ref 0.6–1.3)
GLOBULIN SER CALC-MCNC: 3.3 G/DL (ref 2–4)
GLUCOSE SERPL-MCNC: 92 MG/DL (ref 74–99)
HDLC SERPL-MCNC: 62 MG/DL (ref 40–60)
HDLC SERPL: 4.9 {RATIO} (ref 0–5)
LDLC SERPL CALC-MCNC: 221.4 MG/DL (ref 0–100)
LIPID PROFILE,FLP: ABNORMAL
POTASSIUM SERPL-SCNC: 3.9 MMOL/L (ref 3.5–5.5)
PROT SERPL-MCNC: 7.3 G/DL (ref 6.4–8.2)
SODIUM SERPL-SCNC: 141 MMOL/L (ref 136–145)
TRIGL SERPL-MCNC: 108 MG/DL (ref ?–150)
VLDLC SERPL CALC-MCNC: 21.6 MG/DL

## 2019-04-26 PROCEDURE — 80061 LIPID PANEL: CPT

## 2019-04-26 PROCEDURE — 36415 COLL VENOUS BLD VENIPUNCTURE: CPT

## 2019-04-26 PROCEDURE — 80053 COMPREHEN METABOLIC PANEL: CPT

## 2019-04-26 RX ORDER — FLUTICASONE PROPIONATE AND SALMETEROL 250; 50 UG/1; UG/1
1 POWDER RESPIRATORY (INHALATION) EVERY 12 HOURS
Qty: 1 INHALER | Refills: 3 | Status: SHIPPED | OUTPATIENT
Start: 2019-04-26 | End: 2019-08-20 | Stop reason: SDUPTHER

## 2019-04-26 RX ORDER — LORATADINE 10 MG
10 TABLET,DISINTEGRATING ORAL DAILY
Qty: 30 TAB | Refills: 2 | Status: SHIPPED | OUTPATIENT
Start: 2019-04-26

## 2019-04-26 RX ORDER — DIPHENHYDRAMINE HCL 25 MG
25 TABLET ORAL
Qty: 30 TAB | Refills: 3 | Status: SHIPPED | OUTPATIENT
Start: 2019-04-26 | End: 2020-10-15 | Stop reason: SDUPTHER

## 2019-04-26 RX ORDER — EPINEPHRINE 0.3 MG/.3ML
0.3 INJECTION SUBCUTANEOUS
Qty: 1 SYRINGE | Refills: 0
Start: 2019-04-26 | End: 2021-01-06 | Stop reason: SDUPTHER

## 2019-04-26 RX ORDER — CLONIDINE 0.1 MG/24H
1 PATCH, EXTENDED RELEASE TRANSDERMAL
Qty: 12 PATCH | Refills: 2 | Status: SHIPPED | OUTPATIENT
Start: 2019-04-26 | End: 2019-07-03 | Stop reason: SDUPTHER

## 2019-04-26 RX ORDER — IBUPROFEN 800 MG/1
800 TABLET ORAL
Qty: 60 TAB | Refills: 0 | Status: SHIPPED | OUTPATIENT
Start: 2019-04-26 | End: 2020-10-15 | Stop reason: SDUPTHER

## 2019-04-26 RX ORDER — SUMATRIPTAN 50 MG/1
TABLET, FILM COATED ORAL
Qty: 30 TAB | Refills: 0 | Status: SHIPPED | OUTPATIENT
Start: 2019-04-26 | End: 2021-02-22 | Stop reason: SDUPTHER

## 2019-04-26 NOTE — PATIENT INSTRUCTIONS
Well Visit, Women 48 to 72: Care Instructions Your Care Instructions Physical exams can help you stay healthy. Your doctor has checked your overall health and may have suggested ways to take good care of yourself. He or she also may have recommended tests. At home, you can help prevent illness with healthy eating, regular exercise, and other steps. Follow-up care is a key part of your treatment and safety. Be sure to make and go to all appointments, and call your doctor if you are having problems. It's also a good idea to know your test results and keep a list of the medicines you take. How can you care for yourself at home? · Reach and stay at a healthy weight. This will lower your risk for many problems, such as obesity, diabetes, heart disease, and high blood pressure. · Get at least 30 minutes of exercise on most days of the week. Walking is a good choice. You also may want to do other activities, such as running, swimming, cycling, or playing tennis or team sports. · Do not smoke. Smoking can make health problems worse. If you need help quitting, talk to your doctor about stop-smoking programs and medicines. These can increase your chances of quitting for good. · Protect your skin from too much sun. When you're outdoors from 10 a.m. to 4 p.m., stay in the shade or cover up with clothing and a hat with a wide brim. Wear sunglasses that block UV rays. Even when it's cloudy, put broad-spectrum sunscreen (SPF 30 or higher) on any exposed skin. · See a dentist one or two times a year for checkups and to have your teeth cleaned. · Wear a seat belt in the car. · Limit alcohol to 1 drink a day. Too much alcohol can cause health problems. Follow your doctor's advice about when to have certain tests. These tests can spot problems early. · Cholesterol.  Your doctor will tell you how often to have this done based on your age, family history, or other things that can increase your risk for heart attack and stroke. · Blood pressure. Have your blood pressure checked during a routine doctor visit. Your doctor will tell you how often to check your blood pressure based on your age, your blood pressure results, and other factors. · Mammogram. Ask your doctor how often you should have a mammogram, which is an X-ray of your breasts. A mammogram can spot breast cancer before it can be felt and when it is easiest to treat. · Pap test and pelvic exam. Ask your doctor how often you should have a Pap test. You may not need to have a Pap test as often as you used to. · Vision. Have your eyes checked every year or two or as often as your doctor suggests. Some experts recommend that you have yearly exams for glaucoma and other age-related eye problems starting at age 48. · Hearing. Tell your doctor if you notice any change in your hearing. You can have tests to find out how well you hear. · Diabetes. Ask your doctor whether you should have tests for diabetes. · Colon cancer. You should begin tests for colon cancer at age 48. You may have one of several tests. Your doctor will tell you how often to have tests based on your age and risk. Risks include whether you already had a precancerous polyp removed from your colon or whether your parents, sisters and brothers, or children have had colon cancer. · Thyroid disease. Talk to your doctor about whether to have your thyroid checked as part of a regular physical exam. Women have an increased chance of a thyroid problem. · Osteoporosis. You should begin tests for bone density at age 72. If you are younger than 72, ask your doctor whether you have factors that may increase your risk for this disease. You may want to have this test before age 72. · Heart attack and stroke risk. At least every 4 to 6 years, you should have your risk for heart attack and stroke assessed.  Your doctor uses factors such as your age, blood pressure, cholesterol, and whether you smoke or have diabetes to show what your risk for a heart attack or stroke is over the next 10 years. When should you call for help? Watch closely for changes in your health, and be sure to contact your doctor if you have any problems or symptoms that concern you. Where can you learn more? Go to http://digna-markel.info/. Enter S486 in the search box to learn more about \"Well Visit, Women 50 to 72: Care Instructions. \" Current as of: March 28, 2018 Content Version: 11.9 © 9021-0370 [employer]. Care instructions adapted under license by [employer] (which disclaims liability or warranty for this information). If you have questions about a medical condition or this instruction, always ask your healthcare professional. [address] any warranty or liability for your use of this information.

## 2019-04-26 NOTE — PROGRESS NOTES
Subjective:  
62 y.o. female for Well Woman Check. Her gyne and breast care is done elsewhere by her Ob-Gyne physician. Patient Active Problem List  
Diagnosis Code  
 HTN (hypertension) I10  
 Hyperlipemia E78.5  Asthma J45.909  Allergic rhinitis J30.9  Obesity E66.9  Chronic shoulder pain N38.017, U88.71  
 Lichen planus D01.2  Anxiety F41.9  Allergic conjunctivitis H10.10 Patient Active Problem List  
 Diagnosis Date Noted  Allergic conjunctivitis 03/30/2012  Lichen planus 32/10/2560  Anxiety 09/07/2010  
 HTN (hypertension) 07/16/2010  Hyperlipemia 07/16/2010  Asthma 07/16/2010  Allergic rhinitis 07/16/2010  Obesity 07/16/2010  Chronic shoulder pain 07/16/2010 Current Outpatient Medications Medication Sig Dispense Refill  albuterol (PROVENTIL HFA, VENTOLIN HFA, PROAIR HFA) 90 mcg/actuation inhaler Take 2 Puffs by inhalation every four (4) hours as needed for Wheezing or Shortness of Breath. 1 Inhaler 6  
 albuterol (PROVENTIL VENTOLIN) 2.5 mg /3 mL (0.083 %) nebulizer solution 3 mL by Nebulization route every four (4) hours as needed for Wheezing or Shortness of Breath. 2 Package 3  
 montelukast (SINGULAIR) 5 mg chewable tablet Take 2 Tabs by mouth nightly. 60 Tab 2  
 PARoxetine (PAXIL) 10 mg tablet Take 1 Tab by mouth daily. 30 Tab 3  cloNIDine (CATAPRES) 0.1 mg/24 hr ptwk 1 Patch by TransDERmal route every seven (7) days. 4 Patch 1  
 loratadine (CLARITIN REDITABS) 10 mg dissolvable tablet Take 1 Tab by mouth daily. 30 Tab 2  
 ondansetron (ZOFRAN ODT) 8 mg disintegrating tablet Take 1 Tab by mouth every twelve (12) hours as needed for Nausea. 20 Tab 0  
 SUMAtriptan (IMITREX) 50 mg tablet Take 1 tabs by mouth at onset of headache. Then may repeat 1 tab in 2 hrs if needed 30 Tab 0  
 butalbital-acetaminophen-caffeine (FIORICET, ESGIC) -40 mg per tablet  Nebulizer & Compressor machine 1 Each by Does Not Apply route every four (4) hours as needed. 1 Each 0  
 acetaminophen (TYLENOL EXTRA STRENGTH) 500 mg tablet Take  by mouth every six (6) hours as needed for Pain. Allergies Allergen Reactions  [place] Anaphylaxis  [place] Anaphylaxis  Asa-Acetaminophen-Caff-Potass Hives Just allergic to aspirin only per Pt.  [place] Other (comments)  
  syncope  [place] Concentrate [Flavoring Agent] Nausea and Vomiting  Zyrtec [Cetirizine] Other (comments) Dizziness, blurred vision and disoriented Past Medical History:  
Diagnosis Date  Allergic rhinitis 7/16/2010  Asthma  Chronic shoulder pain 7/16/2010  
 HTN (hypertension) 7/16/2010  Hyperlipemia 7/16/2010  Lichen planus 9/5/1227  Obesity 7/16/2010 Past Surgical History:  
Procedure Laterality Date  HX HYSTERECTOMY  5/16/14 Family History Problem Relation Age of Onset  Hypertension Mother  Diabetes Mother  Hypertension Sister  Diabetes Maternal Grandmother  Cancer Maternal Aunt   
     breast at age 54  Cancer Other   
     lung ca in great grand ma.  No Known Problems Father Social History Tobacco Use  Smoking status: Never Smoker  Smokeless tobacco: Never Used Substance Use Topics  Alcohol use: No  
  
 
Specific concerns today: needs several refills. Report she has had tdap and 1st shingrix at [facility]. 
Patient states she has been having to use her albuterol with the recent weather. States when her allergies area uncontrolled this causes asthma exacerbation requesting advair to be refilled. Review of Systems Review of Systems Constitutional: Negative for chills and fever. HENT: Positive for congestion. Negative for sinus pain and sore throat. Eyes: Negative. Respiratory: Negative for cough and shortness of breath. Cardiovascular: Negative for chest pain and palpitations. Gastrointestinal: Negative for abdominal pain. Genitourinary: Negative. Skin: Negative. Neurological: Negative for dizziness and headaches. Objective:  
Blood pressure (!) 152/94, pulse 92, temperature 98 °F (36.7 °C), temperature source Oral, resp. rate 16, height 5' 0.63\" (1.54 m), weight 165 lb (74.8 kg), last menstrual period 05/16/2014, SpO2 97 %. Physical Examination:  
Physical Exam  
Constitutional: She is oriented to person, place, and time. She appears well-developed and well-nourished. HENT:  
Head: Normocephalic and atraumatic. Right Ear: A middle ear effusion is present. Left Ear: A middle ear effusion is present. Nose: Mucosal edema present. Right sinus exhibits no maxillary sinus tenderness and no frontal sinus tenderness. Left sinus exhibits no maxillary sinus tenderness and no frontal sinus tenderness. Mouth/Throat: Uvula is midline and mucous membranes are normal. No oropharyngeal exudate. Eyes: Pupils are equal, round, and reactive to light. Neck: Normal range of motion. Neck supple. Cardiovascular: Normal rate, regular rhythm and normal heart sounds. Exam reveals no gallop and no friction rub. No murmur heard. Pulmonary/Chest: Effort normal and breath sounds normal. She has no wheezes. She has no rhonchi. She has no rales. Abdominal: Soft. Bowel sounds are normal. There is no tenderness. Musculoskeletal: She exhibits no edema. Lymphadenopathy:  
  She has no cervical adenopathy. Neurological: She is alert and oriented to person, place, and time. She displays normal reflexes. No cranial nerve deficit. Skin: Skin is warm and dry. No erythema. Psychiatric: She has a normal mood and affect. Her behavior is normal. Judgment and thought content normal.  
 
  
 
Assessment/Plan:  
 
routine labs ordered ICD-10-CM ICD-9-CM 1. Well woman exam (no gynecological exam) Z00.00 V70.0 [V70.0] 2. Nonintractable migraine, unspecified migraine type G43.009 346.10 SUMAtriptan (IMITREX) 50 mg tablet 3. Mild intermittent asthma without complication Y85.76 978.97   
4. Essential hypertension H88 975.0 METABOLIC PANEL, COMPREHENSIVE 5. Mixed hyperlipidemia E78.2 272.2 LIPID PANEL Orders Placed This Encounter  LIPID PANEL  
 METABOLIC PANEL, COMPREHENSIVE  fluticasone propion-salmeterol (ADVAIR) 250-50 mcg/dose diskus inhaler  diphenhydrAMINE (BENADRYL ALLERGY) 25 mg tablet  EPINEPHrine (EPIPEN) 0.3 mg/0.3 mL injection  loratadine (CLARITIN REDITABS) 10 mg dissolvable tablet  SUMAtriptan (IMITREX) 50 mg tablet  cloNIDine (CATAPRES) 0.1 mg/24 hr ptwk  ibuprofen (MOTRIN) 800 mg tablet I have discussed the diagnosis with the patient and the intended plan as seen in the above orders. The patient has received an after-visit summary and questions were answered concerning future plans. I have discussed medication side effects and warnings with the patient as well. Patient agreeable with above plan and verbalizes understanding. Follow-up and Dispositions · Return in about 3 months (around 7/26/2019) for HTN/asthma.

## 2019-04-26 NOTE — PROGRESS NOTES
Pt is here for Well woman exam w/o pap 1. Have you been to the ER, urgent care clinic since your last visit? Hospitalized since your last visit? No 
 
2. Have you seen or consulted any other health care providers outside of the [address] [place] [address] since your last visit? Include any pap smears or colon screening. [facility].

## 2019-07-03 NOTE — TELEPHONE ENCOUNTER
Requested Prescriptions     Pending Prescriptions Disp Refills    cloNIDine (CATAPRES) 0.1 mg/24 hr ptwk 12 Patch 2     Si Patch by TransDERmal route every seven (7) days.  ondansetron (ZOFRAN ODT) 8 mg disintegrating tablet 20 Tab 0     Sig: Take 1 Tab by mouth every twelve (12) hours as needed for Nausea.  acetaminophen (TYLENOL EXTRA STRENGTH) 500 mg tablet       Sig: Take  by mouth every six (6) hours as needed for Pain.     butalbital-acetaminophen-caffeine (FIORICET, ESGIC) -40 mg per tablet

## 2019-07-05 RX ORDER — ONDANSETRON 8 MG/1
8 TABLET, ORALLY DISINTEGRATING ORAL
Qty: 20 TAB | Refills: 0 | Status: SHIPPED | OUTPATIENT
Start: 2019-07-05 | End: 2019-09-17 | Stop reason: SDUPTHER

## 2019-07-05 RX ORDER — CLONIDINE 0.1 MG/24H
1 PATCH, EXTENDED RELEASE TRANSDERMAL
Qty: 12 PATCH | Refills: 2 | Status: SHIPPED | OUTPATIENT
Start: 2019-07-05 | End: 2019-10-29 | Stop reason: SDUPTHER

## 2019-07-05 RX ORDER — BUTALBITAL, ACETAMINOPHEN AND CAFFEINE 50; 325; 40 MG/1; MG/1; MG/1
1 TABLET ORAL
Qty: 40 TAB | Refills: 1 | Status: SHIPPED | OUTPATIENT
Start: 2019-07-05 | End: 2019-10-29 | Stop reason: SDUPTHER

## 2019-07-05 RX ORDER — ACETAMINOPHEN 500 MG
500 TABLET ORAL
Qty: 60 TAB | Refills: 0 | Status: SHIPPED | OUTPATIENT
Start: 2019-07-05 | End: 2020-05-27 | Stop reason: SDUPTHER

## 2019-08-20 ENCOUNTER — HOSPITAL ENCOUNTER (OUTPATIENT)
Dept: LAB | Age: 54
Discharge: HOME | End: 2019-08-20
Payer: OTHER GOVERNMENT

## 2019-08-20 ENCOUNTER — OFFICE VISIT (OUTPATIENT)
Dept: FAMILY MEDICINE CLINIC | Age: 54
End: 2019-08-20

## 2019-08-20 VITALS
WEIGHT: 164 LBS | HEIGHT: 61 IN | SYSTOLIC BLOOD PRESSURE: 136 MMHG | HEART RATE: 98 BPM | TEMPERATURE: 98 F | OXYGEN SATURATION: 98 % | DIASTOLIC BLOOD PRESSURE: 85 MMHG | BODY MASS INDEX: 30.96 KG/M2 | RESPIRATION RATE: 16 BRPM

## 2019-08-20 DIAGNOSIS — R42 DIZZINESS: ICD-10-CM

## 2019-08-20 DIAGNOSIS — E78.2 MIXED HYPERLIPIDEMIA: ICD-10-CM

## 2019-08-20 DIAGNOSIS — I10 ESSENTIAL HYPERTENSION: Primary | ICD-10-CM

## 2019-08-20 DIAGNOSIS — I10 ESSENTIAL HYPERTENSION: ICD-10-CM

## 2019-08-20 DIAGNOSIS — J45.30 MILD PERSISTENT ASTHMA WITHOUT COMPLICATION: ICD-10-CM

## 2019-08-20 LAB
ALBUMIN SERPL-MCNC: 4 G/DL (ref 3.4–5)
ALBUMIN/GLOB SERPL: 1.3 {RATIO} (ref 0.8–1.7)
ALP SERPL-CCNC: 159 U/L (ref 45–117)
ALT SERPL-CCNC: 14 U/L (ref 13–56)
ANION GAP SERPL CALC-SCNC: 6 MMOL/L (ref 3–18)
AST SERPL-CCNC: 10 U/L (ref 10–38)
BILIRUB SERPL-MCNC: 0.5 MG/DL (ref 0.2–1)
BUN SERPL-MCNC: 8 MG/DL (ref 7–18)
BUN/CREAT SERPL: 11 (ref 12–20)
CALCIUM SERPL-MCNC: 9.2 MG/DL (ref 8.5–10.1)
CHLORIDE SERPL-SCNC: 104 MMOL/L (ref 100–111)
CHOLEST SERPL-MCNC: 327 MG/DL
CO2 SERPL-SCNC: 28 MMOL/L (ref 21–32)
CREAT SERPL-MCNC: 0.72 MG/DL (ref 0.6–1.3)
GLOBULIN SER CALC-MCNC: 3 G/DL (ref 2–4)
GLUCOSE SERPL-MCNC: 87 MG/DL (ref 74–99)
HDLC SERPL-MCNC: 64 MG/DL (ref 40–60)
HDLC SERPL: 5.1 {RATIO} (ref 0–5)
LDLC SERPL CALC-MCNC: 243 MG/DL (ref 0–100)
LIPID PROFILE,FLP: ABNORMAL
POTASSIUM SERPL-SCNC: 4 MMOL/L (ref 3.5–5.5)
PROT SERPL-MCNC: 7 G/DL (ref 6.4–8.2)
SODIUM SERPL-SCNC: 138 MMOL/L (ref 136–145)
TRIGL SERPL-MCNC: 100 MG/DL (ref ?–150)
VLDLC SERPL CALC-MCNC: 20 MG/DL

## 2019-08-20 PROCEDURE — 80053 COMPREHEN METABOLIC PANEL: CPT

## 2019-08-20 PROCEDURE — 36415 COLL VENOUS BLD VENIPUNCTURE: CPT

## 2019-08-20 PROCEDURE — 80061 LIPID PANEL: CPT

## 2019-08-20 RX ORDER — PAROXETINE 10 MG/1
10 TABLET, FILM COATED ORAL DAILY
Qty: 30 TAB | Refills: 0 | Status: SHIPPED | OUTPATIENT
Start: 2019-08-20 | End: 2019-10-29 | Stop reason: ALTCHOICE

## 2019-08-20 RX ORDER — FLUTICASONE PROPIONATE AND SALMETEROL 250; 50 UG/1; UG/1
1 POWDER RESPIRATORY (INHALATION) EVERY 12 HOURS
Qty: 1 INHALER | Refills: 3 | Status: SHIPPED | OUTPATIENT
Start: 2019-08-20 | End: 2020-10-15 | Stop reason: SDUPTHER

## 2019-08-20 RX ORDER — ERENUMAB-AOOE 70 MG/ML
INJECTION SUBCUTANEOUS
Refills: 3 | COMMUNITY
Start: 2019-08-07 | End: 2019-10-29 | Stop reason: DRUGHIGH

## 2019-08-20 NOTE — PROGRESS NOTES
Subjective:   Silvia Sellers is a 62 y.o. female here today to follow up with Asthma. ROS: denies fever, chills, denies chest pain, denies wheezing or coughing.  has not been taking Advair ® 250  Q12HR as prescribed. States she did not receive Advair when it is was last sent to the pharmacy. No significant ongoing wheezing or shortness of breath, using bronchodilator MDI less than twice a week. States pharmacy didn't have Jazmin Zuluaga, has been using albuterol 2 times daily. Hypertension ROS: taking medications as instructed, no medication side effects noted, no TIA's, no chest pain on exertion, no dyspnea on exertion, no swelling of ankles. New concerns: patient state she continues to have intermittent dizziness. Per patient she was informed neurologist to begin [place] as prescribed and then dizziness will be reassessed. States dizziness seemed to have increased since she began to taper off of her Paxil. Currently taking Paxil 5mg daily. Current Outpatient Medications   Medication Sig Dispense Refill    cloNIDine (CATAPRES) 0.1 mg/24 hr ptwk 1 Patch by TransDERmal route every seven (7) days. 12 Patch 2    ondansetron (ZOFRAN ODT) 8 mg disintegrating tablet Take 1 Tab by mouth every twelve (12) hours as needed for Nausea. 20 Tab 0    acetaminophen (TYLENOL EXTRA STRENGTH) 500 mg tablet Take 1 Tab by mouth every six (6) hours as needed for Pain (not to exceed 4 tabs in 24 hrs). 60 Tab 0    butalbital-acetaminophen-caffeine (FIORICET, ESGIC) -40 mg per tablet Take 1 Tab by mouth every six (6) hours as needed for Pain or Headache. Not to exceed 4 tabs in 24 hrs 40 Tab 1    fluticasone propion-salmeterol (ADVAIR) 250-50 mcg/dose diskus inhaler Take 1 Puff by inhalation every twelve (12) hours. 1 Inhaler 3    diphenhydrAMINE (BENADRYL ALLERGY) 25 mg tablet Take 1 Tab by mouth every six (6) hours as needed (allergies).  30 Tab 3    loratadine (CLARITIN REDITABS) 10 mg dissolvable tablet Take 1 Tab by mouth daily. 30 Tab 2    SUMAtriptan (IMITREX) 50 mg tablet Take 1 tabs by mouth at onset of headache. Then may repeat 1 tab in 2 hrs if needed 30 Tab 0    ibuprofen (MOTRIN) 800 mg tablet Take 1 Tab by mouth every six (6) hours as needed for Pain. 60 Tab 0    albuterol (PROVENTIL HFA, VENTOLIN HFA, PROAIR HFA) 90 mcg/actuation inhaler Take 2 Puffs by inhalation every four (4) hours as needed for Wheezing or Shortness of Breath. 1 Inhaler 6    albuterol (PROVENTIL VENTOLIN) 2.5 mg /3 mL (0.083 %) nebulizer solution 3 mL by Nebulization route every four (4) hours as needed for Wheezing or Shortness of Breath. 2 Package 3    montelukast (SINGULAIR) 5 mg chewable tablet Take 2 Tabs by mouth nightly. 60 Tab 2    PARoxetine (PAXIL) 10 mg tablet Take 1 Tab by mouth daily. 30 Tab 3    Nebulizer & Compressor machine 1 Each by Does Not Apply route every four (4) hours as needed.  1 Each 0    AIMOVIG AUTOINJECTOR 70 mg/mL injection INJECT 1 ML BENEATH THE SKIN EVERY 30 DAYS  3      Patient Active Problem List   Diagnosis Code    HTN (hypertension) I10    Hyperlipemia E78.5    Asthma J45.909    Allergic rhinitis J30.9    Obesity E66.9    Chronic shoulder pain N47.978, Q08.56    Lichen planus I83.7    Anxiety F41.9    Allergic conjunctivitis H10.10     Lab Results   Component Value Date/Time    Cholesterol, total 305 (H) 04/26/2019 10:06 AM    HDL Cholesterol 62 (H) 04/26/2019 10:06 AM    LDL, calculated 221.4 (H) 04/26/2019 10:06 AM    VLDL, calculated 21.6 04/26/2019 10:06 AM    Triglyceride 108 04/26/2019 10:06 AM    CHOL/HDL Ratio 4.9 04/26/2019 10:06 AM     Lab Results   Component Value Date/Time    Sodium 141 04/26/2019 10:06 AM    Potassium 3.9 04/26/2019 10:06 AM    Chloride 105 04/26/2019 10:06 AM    CO2 29 04/26/2019 10:06 AM    Anion gap 7 04/26/2019 10:06 AM    Glucose 92 04/26/2019 10:06 AM    BUN 9 04/26/2019 10:06 AM    Creatinine 0.69 04/26/2019 10:06 AM    BUN/Creatinine ratio 13 04/26/2019 10:06 AM    GFR est AA >60 04/26/2019 10:06 AM    GFR est non-AA >60 04/26/2019 10:06 AM    Calcium 9.3 04/26/2019 10:06 AM    Bilirubin, total 0.4 04/26/2019 10:06 AM    AST (SGOT) 16 04/26/2019 10:06 AM    Alk. phosphatase 196 (H) 04/26/2019 10:06 AM    Protein, total 7.3 04/26/2019 10:06 AM    Albumin 4.0 04/26/2019 10:06 AM    Globulin 3.3 04/26/2019 10:06 AM    A-G Ratio 1.2 04/26/2019 10:06 AM    ALT (SGPT) 20 04/26/2019 10:06 AM     Lab Results   Component Value Date/Time    WBC 5.8 10/11/2013 10:40 AM    HGB 11.9 10/11/2013 10:40 AM    HCT 36.8 10/11/2013 10:40 AM    PLATELET 324 50/17/6845 10:40 AM    MCV 91 10/11/2013 10:40 AM     Wt Readings from Last 3 Encounters:   08/20/19 164 lb (74.4 kg)   04/26/19 165 lb (74.8 kg)   02/19/19 163 lb 6.4 oz (74.1 kg)     BP Readings from Last 3 Encounters:   08/20/19 136/85   04/26/19 (!) 136/94   02/19/19 140/87         Objective:   Visit Vitals  /85 (BP 1 Location: Left arm, BP Patient Position: Sitting)   Pulse 98   Temp 98 °F (36.7 °C) (Oral)   Resp 16   Ht 5' 0.63\" (1.54 m)   Wt 164 lb (74.4 kg)   LMP 05/16/2014   SpO2 98%   BMI 31.37 kg/m²      Awake and alert in no acute distress   Neck supple without lymphadenopathy, no carotid artery bruits auscultated bilaterally. No thyromegaly   Lungs clear throughout   S1 S2 RRR without ectopy or + murmur auscultated. Extremities: no clubbing, cyanosis, +1 non pitting peripheral edema      Peak Flow Reading:  Peak flow reading is 266, about 61 % of predicted. Normal predicated range: 440  Today's range:  250-300  Best 300    Today's attempts:   1st attempt: 250  2nd attempt: 300   3rd attempt: 250        Assessment/Plan:    ICD-10-CM ICD-9-CM    1. Essential hypertension I10 401.9 LIPID PANEL      METABOLIC PANEL, COMPREHENSIVE   2. Mixed hyperlipidemia E78.2 272.2 LIPID PANEL      METABOLIC PANEL, COMPREHENSIVE   3. Mild persistent asthma without complication O63.42 466.06    4.  Dizziness R42 780.4 Orders Placed This Encounter    LIPID PANEL    METABOLIC PANEL, COMPREHENSIVE    AIMOVIG AUTOINJECTOR 70 mg/mL injection    PARoxetine (PAXIL) 10 mg tablet    fluticasone propion-salmeterol (ADVAIR/WIXELA) 250-50 mcg/dose diskus inhaler     Resume taking paxil 10 mg. Once starting Aimovig then decrease to Paxil to 5 mg daily. I have discussed the diagnosis with the patient and the intended plan as seen in the above orders. The patient has received an after-visit summary and questions were answered concerning future plans. I have discussed medication side effects and warnings with the patient as well. Patient agreeable with above plan and verbalizes understanding. Follow-up and Dispositions    · Return in about 1 month (around 9/17/2019) for asthma/arthritis.

## 2019-08-20 NOTE — PROGRESS NOTES
HISTORY OF PRESENT ILLNESS  Juliane Campbell is a 62 y.o. female. HPI   Pt is here for follow-up of her asthma and HTN. Pt was sent over Advair but pharmacy at [facility] never received it. She has been using her Albuterol 2 times a day, morning and night. She has been working in the yard and states that she has had to use Albuterol more often since working in yard. Pt states she has been wheezing a few days ago. Pt does have a headache this morning 5/10 but states it is manageable. Pt denies SOB, CP, chest tightness. Review of Systems   Constitutional: Negative for chills and fever. Eyes: Negative. Respiratory: Negative for cough and shortness of breath. Cardiovascular: Negative for chest pain, palpitations and leg swelling. Skin: Negative. Neurological: Positive for dizziness (lightheadedness when standing). Negative for tingling, weakness and headaches. Physical Exam   Constitutional: She is oriented to person, place, and time. She appears well-developed and well-nourished. HENT:   Head: Normocephalic and atraumatic. Neck: Normal range of motion. Neck supple. Cardiovascular: Normal rate, regular rhythm and normal pulses. Exam reveals no gallop and no friction rub. Murmur heard. Systolic murmur is present. Pulmonary/Chest: Effort normal and breath sounds normal. No respiratory distress. Abdominal: Soft. Bowel sounds are normal.   Musculoskeletal: Normal range of motion. She exhibits edema (plus 1 to bilateral ankles). Neurological: She is alert and oriented to person, place, and time. She has normal reflexes. Skin: Skin is warm and dry. Psychiatric: She has a normal mood and affect. Nursing note and vitals reviewed.

## 2019-08-20 NOTE — PROGRESS NOTES
Peak Flow Measurements this visit:    1st attempt: 250    2nd attempt: 300    3rd attempt: 250                                                                   Average: 266    Best of three: 300

## 2019-08-20 NOTE — PROGRESS NOTES
Lucille Miller presents today for   Chief Complaint   Patient presents with    Hypertension    Asthma       Is someone accompanying this pt? no    Is the patient using any DME equipment during OV? no    Depression Screening:  3 most recent PHQ Screens 4/26/2019   Little interest or pleasure in doing things Not at all   Feeling down, depressed, irritable, or hopeless Not at all   Total Score PHQ 2 0   Trouble falling or staying asleep, or sleeping too much Not at all   Feeling tired or having little energy Not at all   Poor appetite, weight loss, or overeating Several days   Feeling bad about yourself - or that you are a failure or have let yourself or your family down Not at all   Trouble concentrating on things such as school, work, reading, or watching TV Not at all   Moving or speaking so slowly that other people could have noticed; or the opposite being so fidgety that others notice Not at all   Thoughts of being better off dead, or hurting yourself in some way Not at all   PHQ 9 Score 1   How difficult have these problems made it for you to do your work, take care of your home and get along with others Not difficult at all       Learning Assessment:  Learning Assessment 5/22/2015   PRIMARY LEARNER Patient   HIGHEST LEVEL OF EDUCATION - PRIMARY LEARNER  -   BARRIERS PRIMARY LEARNER -   CO-LEARNER CAREGIVER -   PRIMARY LANGUAGE ENGLISH   LEARNER PREFERENCE PRIMARY LISTENING     READING     DEMONSTRATION     -   ANSWERED BY patient   RELATIONSHIP SELF       Abuse Screening:  Abuse Screening Questionnaire 2/23/2018   Do you ever feel afraid of your partner? N   Are you in a relationship with someone who physically or mentally threatens you? N   Is it safe for you to go home? Y       Fall Risk  No flowsheet data found. Health Maintenance reviewed and discussed and ordered per Provider.     Health Maintenance Due   Topic Date Due    Hepatitis C Screening  1965    Pneumococcal 0-64 years (1 of 1 - PPSV23) 03/30/1969    Shingrix Vaccine Age 50> (1 of 2) 03/30/2013    FOBT Q 1 YEAR AGE 50-75  03/30/2013    PAP AKA CERVICAL CYTOLOGY  08/12/2013    Influenza Age 9 to Adult  08/01/2019           Coordination of Care:  1. Have you been to the ER, urgent care clinic since your last visit? Hospitalized since your last visit? no    2. Have you seen or consulted any other health care providers outside of the [address] [place] [address] since your last visit? Include any pap smears or colon screening.  no

## 2019-08-23 ENCOUNTER — TELEPHONE (OUTPATIENT)
Dept: FAMILY MEDICINE CLINIC | Age: 54
End: 2019-08-23

## 2019-08-23 NOTE — TELEPHONE ENCOUNTER
----- Message from Gypsy Marin NP sent at 8/22/2019  8:08 AM EDT -----  Please advise patient her cholesterol has greatly increased. She will need to begin on crestor 20 mg nightly. I have sent prescription to [facility].  Thanks!

## 2019-08-29 NOTE — TELEPHONE ENCOUNTER
----- Message from Eris Villagran NP sent at 8/22/2019  8:08 AM EDT -----  Please advise patient her cholesterol has greatly increased. She will need to begin on crestor 20 mg nightly. I have sent prescription to [facility].  Thanks!

## 2019-08-29 NOTE — TELEPHONE ENCOUNTER
----- Message from Mara Elmore NP sent at 8/22/2019  8:08 AM EDT -----  Please advise patient her cholesterol has greatly increased. She will need to begin on crestor 20 mg nightly. I have sent prescription to [facility].  Thanks!

## 2019-09-17 ENCOUNTER — OFFICE VISIT (OUTPATIENT)
Dept: FAMILY MEDICINE CLINIC | Age: 54
End: 2019-09-17

## 2019-09-17 VITALS
WEIGHT: 167 LBS | HEART RATE: 61 BPM | OXYGEN SATURATION: 98 % | DIASTOLIC BLOOD PRESSURE: 88 MMHG | BODY MASS INDEX: 31.53 KG/M2 | RESPIRATION RATE: 17 BRPM | HEIGHT: 61 IN | TEMPERATURE: 98 F | SYSTOLIC BLOOD PRESSURE: 144 MMHG

## 2019-09-17 DIAGNOSIS — M65.319 TRIGGER FINGER OF THUMB, UNSPECIFIED LATERALITY: ICD-10-CM

## 2019-09-17 DIAGNOSIS — M54.2 NECK PAIN: ICD-10-CM

## 2019-09-17 DIAGNOSIS — E78.2 MIXED HYPERLIPIDEMIA: Primary | ICD-10-CM

## 2019-09-17 RX ORDER — ROSUVASTATIN CALCIUM 20 MG/1
20 TABLET, COATED ORAL
Qty: 90 TAB | Refills: 0 | Status: SHIPPED | OUTPATIENT
Start: 2019-09-17 | End: 2019-10-29 | Stop reason: SINTOL

## 2019-09-17 RX ORDER — ONDANSETRON 8 MG/1
8 TABLET, ORALLY DISINTEGRATING ORAL
Qty: 20 TAB | Refills: 0 | Status: SHIPPED | OUTPATIENT
Start: 2019-09-17 | End: 2020-02-18 | Stop reason: SDUPTHER

## 2019-09-17 NOTE — PROGRESS NOTES
Elizacorrie Chakraborty presents today for   Chief Complaint   Patient presents with    Neck Pain    Arthritis       Is someone accompanying this pt? no    Is the patient using any DME equipment during OV? no    Depression Screening:  3 most recent PHQ Screens 4/26/2019   Little interest or pleasure in doing things Not at all   Feeling down, depressed, irritable, or hopeless Not at all   Total Score PHQ 2 0   Trouble falling or staying asleep, or sleeping too much Not at all   Feeling tired or having little energy Not at all   Poor appetite, weight loss, or overeating Several days   Feeling bad about yourself - or that you are a failure or have let yourself or your family down Not at all   Trouble concentrating on things such as school, work, reading, or watching TV Not at all   Moving or speaking so slowly that other people could have noticed; or the opposite being so fidgety that others notice Not at all   Thoughts of being better off dead, or hurting yourself in some way Not at all   PHQ 9 Score 1   How difficult have these problems made it for you to do your work, take care of your home and get along with others Not difficult at all       Learning Assessment:  Learning Assessment 5/22/2015   PRIMARY LEARNER Patient   HIGHEST LEVEL OF EDUCATION - PRIMARY LEARNER  -   BARRIERS PRIMARY LEARNER -   CO-LEARNER CAREGIVER -   PRIMARY LANGUAGE ENGLISH   LEARNER PREFERENCE PRIMARY LISTENING     READING     DEMONSTRATION     -   ANSWERED BY patient   RELATIONSHIP SELF       Abuse Screening:  Abuse Screening Questionnaire 2/23/2018   Do you ever feel afraid of your partner? N   Are you in a relationship with someone who physically or mentally threatens you? N   Is it safe for you to go home? Y       Fall Risk  No flowsheet data found. Health Maintenance reviewed and discussed and ordered per Provider.     Health Maintenance Due   Topic Date Due    Hepatitis C Screening  1965    Pneumococcal 0-64 years (1 of 1 - PPSV23) 03/30/1969    Shingrix Vaccine Age 50> (1 of 2) 03/30/2013    FOBT Q 1 YEAR AGE 50-75  03/30/2013    PAP AKA CERVICAL CYTOLOGY  08/12/2013    Influenza Age 9 to Adult  08/01/2019           Coordination of Care:  1. Have you been to the ER, urgent care clinic since your last visit? Hospitalized since your last visit? no    2. Have you seen or consulted any other health care providers outside of the [address] [place] [address] since your last visit? Include any pap smears or colon screening.  no

## 2019-09-17 NOTE — PROGRESS NOTES
HISTORY OF PRESENT ILLNESS  Juliane Campbell is a 62 y.o. female. HPI   Pt is here to be evaluated for arthritis, and neck pain. Pt states that her arthritis is located in both hands and is worse in her thumbs. Pt pain is described as stiff and steady pain 4/10 now and 10/10 in the mornings. This has been worsening for the past 2 years, with the last couple of months she has not been able to move her thumbs in the morning. Pt stats that the Naproxen helps minimally, it takes the inflammation down. Pt is also concerned with her neck pain. She notices that when turning her neck she has a popping or cracking sensation that is not painful but startling. The pain is located in the midline, closer to the right. Pain is rated a 1-2/10 and more of an annoyance more so than pain. She has not had any imaging done in the recent past.    Pt is now having a migraine 7/10, she is taking Aimovig, first dose was on the 31st. She has not had relief from headaches as of yet but neurology states that it may take some for medication to take effect. Review of Systems   Constitutional: Negative for chills and fever. HENT: Negative. Eyes: Negative. Respiratory: Negative for cough and shortness of breath. Cardiovascular: Negative for chest pain, palpitations and leg swelling. Gastrointestinal: Negative for abdominal pain and vomiting. Musculoskeletal: Positive for joint pain (bilateral hands, and thumbs) and neck pain. Skin: Negative. Neurological: Negative for dizziness, tingling, weakness and headaches. Psychiatric/Behavioral: Negative. Physical Exam   Constitutional: She is oriented to person, place, and time. She appears well-developed and well-nourished. HENT:   Head: Normocephalic and atraumatic. Neck: Normal range of motion. Neck supple. Cardiovascular: Normal rate, regular rhythm and normal heart sounds. Exam reveals no gallop and no friction rub. No murmur heard.   Pulmonary/Chest: Effort normal and breath sounds normal. No respiratory distress. Abdominal: Soft. Bowel sounds are normal.   Musculoskeletal: Normal range of motion. Neurological: She is alert and oriented to person, place, and time. She has normal reflexes. Skin: Skin is warm and dry. Psychiatric: She has a normal mood and affect. Nursing note and vitals reviewed.

## 2019-09-17 NOTE — PATIENT INSTRUCTIONS
Neck Pain: Care Instructions  Your Care Instructions    You can have neck pain anywhere from the bottom of your head to the top of your shoulders. It can spread to the upper back or arms. Injuries, painting a ceiling, sleeping with your neck twisted, staying in one position for too long, and many other activities can cause neck pain. Most neck pain gets better with home care. Your doctor may recommend medicine to relieve pain or relax your muscles. He or she may suggest exercise and physical therapy to increase flexibility and relieve stress. You may need to wear a special (cervical) collar to support your neck for a day or two. Follow-up care is a key part of your treatment and safety. Be sure to make and go to all appointments, and call your doctor if you are having problems. It's also a good idea to know your test results and keep a list of the medicines you take. How can you care for yourself at home? · Try using a heating pad on a low or medium setting for 15 to 20 minutes every 2 or 3 hours. Try a warm shower in place of one session with the heating pad. · You can also try an ice pack for 10 to 15 minutes every 2 to 3 hours. Put a thin cloth between the ice and your skin. · Take pain medicines exactly as directed. ? If the doctor gave you a prescription medicine for pain, take it as prescribed. ? If you are not taking a prescription pain medicine, ask your doctor if you can take an over-the-counter medicine. · If your doctor recommends a cervical collar, wear it exactly as directed. When should you call for help? Call your doctor now or seek immediate medical care if:    · You have new or worsening numbness in your arms, buttocks or legs.     · You have new or worsening weakness in your arms or legs.  (This could make it hard to stand up.)     · You lose control of your bladder or bowels.    Watch closely for changes in your health, and be sure to contact your doctor if:    · Your neck pain is getting worse.     · You are not getting better after 1 week.     · You do not get better as expected. Where can you learn more? Go to http://digna-markel.info/. Enter [id number] in the search box to learn more about \"Neck Pain: Care Instructions. \"  Current as of: September 20, 2018  Content Version: 12.1  © 1816-8536 [employer]. Care instructions adapted under license by [employer] (which disclaims liability or warranty for this information). If you have questions about a medical condition or this instruction, always ask your healthcare professional. [place] [address] any warranty or liability for your use of this information. Neck: Exercises  Introduction  Here are some examples of exercises for you to try. The exercises may be suggested for a condition or for rehabilitation. Start each exercise slowly. Ease off the exercises if you start to have pain. You will be told when to start these exercises and which ones will work best for you. How to do the exercises  Neck stretch    1. This stretch works best if you keep your shoulder down as you lean away from it. To help you remember to do this, start by relaxing your shoulders and lightly holding on to your thighs or your chair. 2. Tilt your head toward your shoulder and hold for 15 to 30 seconds. Let the weight of your head stretch your muscles. 3. If you would like a little added stretch, use your hand to gently and steadily pull your head toward your shoulder. For example, keeping your right shoulder down, lean your head to the left. 4. Repeat 2 to 4 times toward each shoulder. Diagonal neck stretch    1. Turn your head slightly toward the direction you will be stretching, and tilt your head diagonally toward your chest and hold for 15 to 30 seconds.   2. If you would like a little added stretch, use your hand to gently and steadily pull your head forward on the diagonal.  3. Repeat 2 to 4 times toward each side. Dorsal glide stretch    1. Sit or stand tall and look straight ahead. 2. Slowly tuck your chin as you glide your head backward over your body  3. Hold for a count of 6, and then relax for up to 10 seconds. 4. Repeat 8 to 12 times. Chest and shoulder stretch    1. Sit or stand tall and glide your head backward as in the dorsal glide stretch. 2. Raise both arms so that your hands are next to your ears. 3. Take a deep breath, and as you breathe out, lower your elbows down and behind your back. You will feel your shoulder blades slide down and together, and at the same time you will feel a stretch across your chest and the front of your shoulders. 4. Hold for about 6 seconds, and then relax for up to 10 seconds. 5. Repeat 8 to 12 times. Strengthening: Hands on head    1. Move your head backward, forward, and side to side against gentle pressure from your hands, holding each position for about 6 seconds. 2. Repeat 8 to 12 times. Follow-up care is a key part of your treatment and safety. Be sure to make and go to all appointments, and call your doctor if you are having problems. It's also a good idea to know your test results and keep a list of the medicines you take. Where can you learn more? Go to http://digna-markel.info/. Enter P975 in the search box to learn more about \"Neck: Exercises. \"  Current as of: September 20, 2018  Content Version: 12.1  © 0729-2653 [employer]. Care instructions adapted under license by [employer] (which disclaims liability or warranty for this information). If you have questions about a medical condition or this instruction, always ask your healthcare professional. [address] any warranty or liability for your use of this information.

## 2019-09-17 NOTE — PROGRESS NOTES
HISTORY OF PRESENT ILLNESS  Raj Singh is a 62 y.o. female. Patient presents today for further evaluation and treatment of arthritis. Reports for the last 2 years arthritis has progressively worsening. Comments over the last several months pain has been severe to were she can't bend her thumbs. They are very stiff. Comments as the day progresses pain improves. Reports pain is located mostly on right. Allergies   Allergen Reactions    [place] Anaphylaxis    Cherry Anaphylaxis    Asa-Acetaminophen-Caff-Potass Hives     Just allergic to aspirin only per Pt.  [place] Other (comments)     syncope    [place] Concentrate [Flavoring Agent] Nausea and Vomiting    Zyrtec [Cetirizine] Other (comments)     Dizziness, blurred vision and disoriented     Current Outpatient Medications   Medication Sig Dispense Refill    AIMOVIG AUTOINJECTOR 70 mg/mL injection INJECT 1 ML BENEATH THE SKIN EVERY 30 DAYS  3    PARoxetine (PAXIL) 10 mg tablet Take 1 Tab by mouth daily. 30 Tab 0    fluticasone propion-salmeterol (ADVAIR/WIXELA) 250-50 mcg/dose diskus inhaler Take 1 Puff by inhalation every twelve (12) hours. 1 Inhaler 3    cloNIDine (CATAPRES) 0.1 mg/24 hr ptwk 1 Patch by TransDERmal route every seven (7) days. 12 Patch 2    ondansetron (ZOFRAN ODT) 8 mg disintegrating tablet Take 1 Tab by mouth every twelve (12) hours as needed for Nausea. 20 Tab 0    acetaminophen (TYLENOL EXTRA STRENGTH) 500 mg tablet Take 1 Tab by mouth every six (6) hours as needed for Pain (not to exceed 4 tabs in 24 hrs). 60 Tab 0    butalbital-acetaminophen-caffeine (FIORICET, ESGIC) -40 mg per tablet Take 1 Tab by mouth every six (6) hours as needed for Pain or Headache. Not to exceed 4 tabs in 24 hrs 40 Tab 1    diphenhydrAMINE (BENADRYL ALLERGY) 25 mg tablet Take 1 Tab by mouth every six (6) hours as needed (allergies). 30 Tab 3    loratadine (CLARITIN REDITABS) 10 mg dissolvable tablet Take 1 Tab by mouth daily.  30 Tab 2  SUMAtriptan (IMITREX) 50 mg tablet Take 1 tabs by mouth at onset of headache. Then may repeat 1 tab in 2 hrs if needed 30 Tab 0    ibuprofen (MOTRIN) 800 mg tablet Take 1 Tab by mouth every six (6) hours as needed for Pain. 60 Tab 0    albuterol (PROVENTIL HFA, VENTOLIN HFA, PROAIR HFA) 90 mcg/actuation inhaler Take 2 Puffs by inhalation every four (4) hours as needed for Wheezing or Shortness of Breath. 1 Inhaler 6    albuterol (PROVENTIL VENTOLIN) 2.5 mg /3 mL (0.083 %) nebulizer solution 3 mL by Nebulization route every four (4) hours as needed for Wheezing or Shortness of Breath. 2 Package 3    montelukast (SINGULAIR) 5 mg chewable tablet Take 2 Tabs by mouth nightly. 60 Tab 2    Nebulizer & Compressor machine 1 Each by Does Not Apply route every four (4) hours as needed.  1 Each 0     Past Medical History:   Diagnosis Date    Allergic rhinitis 7/16/2010    Asthma     Chronic shoulder pain 7/16/2010    HTN (hypertension) 7/16/2010    Hyperlipemia 6/03/7779    Lichen planus 2/1/8674    Obesity 7/16/2010     Social History     Socioeconomic History    Marital status:      Spouse name: Not on file    Number of children: Not on file    Years of education: Not on file    Highest education level: Not on file   Occupational History    Not on file   Social Needs    Financial resource strain: Not on file    Food insecurity:     Worry: Not on file     Inability: Not on file    Transportation needs:     Medical: Not on file     Non-medical: Not on file   Tobacco Use    Smoking status: Never Smoker    Smokeless tobacco: Never Used   Substance and Sexual Activity    Alcohol use: No    Drug use: No    Sexual activity: Yes     Partners: Male     Birth control/protection: Surgical     Comment: btl   Lifestyle    Physical activity:     Days per week: Not on file     Minutes per session: Not on file    Stress: Not on file   Relationships    Social connections:     Talks on phone: Not on file Gets together: Not on file     Attends Yazdanism service: Not on file     Active member of club or organization: Not on file     Attends meetings of clubs or organizations: Not on file     Relationship status: Not on file    Intimate partner violence:     Fear of current or ex partner: Not on file     Emotionally abused: Not on file     Physically abused: Not on file     Forced sexual activity: Not on file   Other Topics Concern    Not on file   Social History Narrative    Not on file     Wt Readings from Last 3 Encounters:   09/17/19 167 lb (75.8 kg)   08/20/19 164 lb (74.4 kg)   04/26/19 165 lb (74.8 kg)     BP Readings from Last 3 Encounters:   09/17/19 144/88   08/20/19 136/85   04/26/19 (!) 136/94     Review of Systems   Constitutional: Negative for chills and fever. Respiratory: Negative for shortness of breath. Cardiovascular: Negative for chest pain and palpitations. Musculoskeletal: Positive for joint pain and neck pain. Joint swelling   Neurological: Negative for dizziness and headaches. /88 (BP 1 Location: Left arm, BP Patient Position: Sitting)   Pulse 61   Temp 98 °F (36.7 °C) (Oral)   Resp 17   Ht 5' 0.63\" (1.54 m)   Wt 167 lb (75.8 kg)   LMP 05/16/2014   SpO2 98%   BMI 31.94 kg/m²   Physical Exam   Constitutional: She is oriented to person, place, and time. She appears well-developed and well-nourished. HENT:   Head: Normocephalic and atraumatic. Neck: Normal range of motion. Neck supple. Cardiovascular: Normal rate, regular rhythm and normal heart sounds. Exam reveals no gallop and no friction rub. No murmur heard. Pulmonary/Chest: Effort normal and breath sounds normal. She has no wheezes. She has no rhonchi. She has no rales. Musculoskeletal:        Right hand: She exhibits tenderness (thumb). Left hand: She exhibits tenderness (thumb). Normal sensation noted. Normal strength noted.    Neurological: She is alert and oriented to person, place, and time. Skin: Skin is warm and dry. ASSESSMENT and PLAN    ICD-10-CM ICD-9-CM    1. Mixed hyperlipidemia E78.2 272.2    2. Neck pain M54.2 723.1 XR SPINE CERV W OBL/FLEX/EXT MIN 6 V COMP   3. Trigger finger of thumb, unspecified laterality M65.319 727.03 REFERRAL TO ORTHOPEDICS     Orders Placed This Encounter    XR SPINE CERV W OBL/FLEX/EXT MIN 6 V COMP    [facility]    rosuvastatin (CRESTOR) 20 mg tablet    ondansetron (ZOFRAN ODT) 8 mg disintegrating tablet     I have discussed the diagnosis with the patient and the intended plan as seen in the above orders. The patient has received an after-visit summary and questions were answered concerning future plans. I have discussed medication side effects and warnings with the patient as well. Patient agreeable with above plan and verbalizes understanding. Follow-up and Dispositions    · Return in about 6 weeks (around 10/29/2019) for HLD/HTN.

## 2019-10-03 ENCOUNTER — OFFICE VISIT (OUTPATIENT)
Dept: ORTHOPEDIC SURGERY | Age: 54
End: 2019-10-03

## 2019-10-03 VITALS
WEIGHT: 165.8 LBS | HEIGHT: 61 IN | TEMPERATURE: 97.5 F | BODY MASS INDEX: 31.3 KG/M2 | DIASTOLIC BLOOD PRESSURE: 68 MMHG | RESPIRATION RATE: 12 BRPM | SYSTOLIC BLOOD PRESSURE: 107 MMHG | HEART RATE: 72 BPM | OXYGEN SATURATION: 100 %

## 2019-10-03 DIAGNOSIS — M65.312 BILATERAL TRIGGER THUMB: Primary | ICD-10-CM

## 2019-10-03 DIAGNOSIS — M79.642 BILATERAL HAND PAIN: ICD-10-CM

## 2019-10-03 DIAGNOSIS — M79.641 BILATERAL HAND PAIN: ICD-10-CM

## 2019-10-03 DIAGNOSIS — M65.311 BILATERAL TRIGGER THUMB: Primary | ICD-10-CM

## 2019-10-03 NOTE — PROGRESS NOTES
1. Have you been to the ER, urgent care clinic since your last visit? Hospitalized since your last visit? No    2. Have you seen or consulted any other health care providers outside of the [address] [place] [address] since your last visit? Include any pap smears or colon screening.  No

## 2019-10-03 NOTE — PROGRESS NOTES
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 Worker's Compensation and legal considerations: not known. Vitals:    10/03/19 1312   BP: 107/68   Pulse: 72   Resp: 12   Temp: 97.5 °F (36.4 °C)   TempSrc: Oral   SpO2: 100%   Weight: 165 lb 12.8 oz (75.2 kg)   Height: 5' 0.63\" (1.54 m)   PainSc:   3   PainLoc: Hand   LMP: 05/16/2014           Chief Complaint   Patient presents with    Hand Pain     JAYY HAND PAIN         HPI: Patient comes in today with complaints of bilateral thumb pain and locking. She reports been going on for years worsening in the past month. Date of onset: Indeterminate    Injury: No    Prior Treatment:  No    Numbness/ Tingling: No    ROS: Review of Systems - General ROS: negative  Respiratory ROS: no cough, shortness of breath, or wheezing  Cardiovascular ROS: no chest pain or dyspnea on exertion  Musculoskeletal ROS: positive for - pain in thumb - bilateral  Neurological ROS: negative  Dermatological ROS: negative    Past Medical History:   Diagnosis Date    Allergic rhinitis 7/16/2010    Asthma     Chronic shoulder pain 7/16/2010    HTN (hypertension) 7/16/2010    Hyperlipemia 3/48/0199    Lichen planus 7/4/2157    Obesity 7/16/2010       Past Surgical History:   Procedure Laterality Date    HX HYSTERECTOMY  5/16/14       Current Outpatient Medications   Medication Sig Dispense Refill    triamcinolone acetonide (KENALOG) 10 mg/mL injection 1 mL by Intra artICUlar route once for 1 dose. 1 Vial 0    rosuvastatin (CRESTOR) 20 mg tablet Take 1 Tab by mouth nightly. 90 Tab 0    ondansetron (ZOFRAN ODT) 8 mg disintegrating tablet Take 1 Tab by mouth every twelve (12) hours as needed for Nausea. 20 Tab 0    AIMOVIG AUTOINJECTOR 70 mg/mL injection INJECT 1 ML BENEATH THE SKIN EVERY 30 DAYS  3    PARoxetine (PAXIL) 10 mg tablet Take 1 Tab by mouth daily.  30 Tab 0    fluticasone propion-salmeterol (ADVAIR/WIXELA) 250-50 mcg/dose diskus inhaler Take 1 Puff by inhalation every twelve (12) hours. 1 Inhaler 3    cloNIDine (CATAPRES) 0.1 mg/24 hr ptwk 1 Patch by TransDERmal route every seven (7) days. 12 Patch 2    acetaminophen (TYLENOL EXTRA STRENGTH) 500 mg tablet Take 1 Tab by mouth every six (6) hours as needed for Pain (not to exceed 4 tabs in 24 hrs). 60 Tab 0    butalbital-acetaminophen-caffeine (FIORICET, ESGIC) -40 mg per tablet Take 1 Tab by mouth every six (6) hours as needed for Pain or Headache. Not to exceed 4 tabs in 24 hrs 40 Tab 1    diphenhydrAMINE (BENADRYL ALLERGY) 25 mg tablet Take 1 Tab by mouth every six (6) hours as needed (allergies). 30 Tab 3    loratadine (CLARITIN REDITABS) 10 mg dissolvable tablet Take 1 Tab by mouth daily. 30 Tab 2    SUMAtriptan (IMITREX) 50 mg tablet Take 1 tabs by mouth at onset of headache. Then may repeat 1 tab in 2 hrs if needed 30 Tab 0    ibuprofen (MOTRIN) 800 mg tablet Take 1 Tab by mouth every six (6) hours as needed for Pain. 60 Tab 0    albuterol (PROVENTIL HFA, VENTOLIN HFA, PROAIR HFA) 90 mcg/actuation inhaler Take 2 Puffs by inhalation every four (4) hours as needed for Wheezing or Shortness of Breath. 1 Inhaler 6    albuterol (PROVENTIL VENTOLIN) 2.5 mg /3 mL (0.083 %) nebulizer solution 3 mL by Nebulization route every four (4) hours as needed for Wheezing or Shortness of Breath. 2 Package 3    montelukast (SINGULAIR) 5 mg chewable tablet Take 2 Tabs by mouth nightly. 60 Tab 2    Nebulizer & Compressor machine 1 Each by Does Not Apply route every four (4) hours as needed. 1 Each 0       Allergies   Allergen Reactions    [place] Anaphylaxis    Cherry Anaphylaxis    Asa-Acetaminophen-Caff-Potass Hives     Just allergic to aspirin only per Pt.      [place] Other (comments)     syncope    [place] Concentrate [Flavoring Agent] Nausea and Vomiting    Zyrtec [Cetirizine] Other (comments)     Dizziness, blurred vision and disoriented         PE:     Hand:    Examination L Digit(s) R Digit(s)   1st CMC Tenderness -  -    1st CMC Grind -  -    Cyndi Nodes -  -    [place] Nodes -  -    A1 Pulley Tenderness +  +    Triggering +  +    UCL Instability -  -    RCL Instability -  -    Lateral Stress Pain -  -    Palmar Cords -  -    Tabletop test -  -    Garrod's Pads -  -     Strength       Pinch Strength         ROM: Full      Imaging:     Plain films of bilateral hands shows minimal degenerative changes throughout the hand. ICD-10-CM ICD-9-CM    1. Bilateral trigger thumb M65.311 727.03 TRIAMCINOLONE ACETONIDE INJ    M65.312  triamcinolone acetonide (KENALOG) 10 mg/mL injection      INJECT TENDON ORIGIN/INSERT   2. Bilateral hand pain M79.641 729.5 AMB POC XRAY, HAND; 3+ VIEWS    M79.642  AMB POC XRAY, HAND; 3+ VIEWS       Plan:     Bilateral trigger thumb injections    Follow-up PRN    Plan was reviewed with patient, who verbalized agreement and understanding of the plan    [address] [place] [address]  OFFICE PROCEDURE PROGRESS NOTE        Chart reviewed for the following:   Afshin MONROY DO, have reviewed the History, Physical and updated the Allergic reactions for [address] [place] [address] performed immediately prior to start of procedure:   Afhsin MONROY DO, have performed the following reviews on [place] [address] prior to the start of the procedure:            * Patient was identified by name and date of birth   * Agreement on procedure being performed was verified  * Risks and Benefits explained to the patient  * Procedure site verified and marked as necessary  * Patient was positioned for comfort  * Consent was signed and verified     Time: 14:25      Date of procedure: 10/3/2019    Procedure performed by:   Isabela Quezada DO    Provider assisted by: Jovanna Ramos LPN    Patient assisted by: self    How tolerated by patient: tolerated the procedure well with no complications    Post Procedural Pain Scale: 0 - No Kalyn    Comments: none    Procedure:  After consent was obtained, using sterile technique the tendon was prepped. Local anesthetic used: 1% lidocaine. Kenalog 5 mg X2 and was then injected and the needle withdrawn. The procedure was well tolerated. The patient is asked to continue to rest the area for a few more days before resuming regular activities. It may be more painful for the first 1-2 days. Watch for fever, or increased swelling or persistent pain in the joint. Call or return to clinic prn if such symptoms occur or there is failure to improve as anticipated.

## 2019-10-03 NOTE — PROGRESS NOTES
As per Dr. Viridiana Bustillo instruction 1ml of kenalog 10 and 1ml of 1% lidocaine drawn into syringe for injection. Bilateral thumbs.

## 2019-10-29 ENCOUNTER — CLINICAL SUPPORT (OUTPATIENT)
Dept: FAMILY MEDICINE CLINIC | Age: 54
End: 2019-10-29

## 2019-10-29 ENCOUNTER — OFFICE VISIT (OUTPATIENT)
Dept: FAMILY MEDICINE CLINIC | Age: 54
End: 2019-10-29

## 2019-10-29 VITALS
OXYGEN SATURATION: 98 % | HEIGHT: 61 IN | RESPIRATION RATE: 17 BRPM | SYSTOLIC BLOOD PRESSURE: 150 MMHG | WEIGHT: 168 LBS | BODY MASS INDEX: 31.72 KG/M2 | DIASTOLIC BLOOD PRESSURE: 94 MMHG | TEMPERATURE: 97.7 F | HEART RATE: 73 BPM

## 2019-10-29 VITALS — SYSTOLIC BLOOD PRESSURE: 154 MMHG | DIASTOLIC BLOOD PRESSURE: 93 MMHG

## 2019-10-29 DIAGNOSIS — M54.2 NECK PAIN: ICD-10-CM

## 2019-10-29 DIAGNOSIS — N94.10 DYSPAREUNIA IN FEMALE: ICD-10-CM

## 2019-10-29 DIAGNOSIS — Z23 ENCOUNTER FOR IMMUNIZATION: ICD-10-CM

## 2019-10-29 DIAGNOSIS — E78.2 MIXED HYPERLIPIDEMIA: Primary | ICD-10-CM

## 2019-10-29 DIAGNOSIS — Z23 ENCOUNTER FOR IMMUNIZATION: Primary | ICD-10-CM

## 2019-10-29 RX ORDER — AMITRIPTYLINE HYDROCHLORIDE 10 MG/1
10 TABLET, FILM COATED ORAL
COMMUNITY
Start: 2019-10-22 | End: 2021-03-15 | Stop reason: SDUPTHER

## 2019-10-29 RX ORDER — ROSUVASTATIN CALCIUM 5 MG/1
5 TABLET, COATED ORAL
Qty: 30 TAB | Refills: 0 | Status: SHIPPED | OUTPATIENT
Start: 2019-10-29 | End: 2019-12-23 | Stop reason: SDUPTHER

## 2019-10-29 RX ORDER — PROPYLENE GLYCOL/PEG 400 0.3 %-0.4%
DROPS OPHTHALMIC (EYE)
Qty: 20 EACH | Refills: 1 | Status: SHIPPED | OUTPATIENT
Start: 2019-10-29 | End: 2020-05-27 | Stop reason: SDUPTHER

## 2019-10-29 RX ORDER — MONTELUKAST SODIUM 5 MG/1
10 TABLET, CHEWABLE ORAL
Qty: 60 TAB | Refills: 2 | Status: SHIPPED | OUTPATIENT
Start: 2019-10-29 | End: 2020-02-18 | Stop reason: SDUPTHER

## 2019-10-29 RX ORDER — BUTALBITAL, ACETAMINOPHEN AND CAFFEINE 50; 325; 40 MG/1; MG/1; MG/1
1 TABLET ORAL
Qty: 40 TAB | Refills: 1 | Status: SHIPPED | OUTPATIENT
Start: 2019-10-29 | End: 2020-10-15 | Stop reason: SDUPTHER

## 2019-10-29 RX ORDER — CLONIDINE 0.1 MG/24H
1 PATCH, EXTENDED RELEASE TRANSDERMAL
Qty: 12 PATCH | Refills: 2 | Status: SHIPPED | OUTPATIENT
Start: 2019-10-29 | End: 2020-09-11 | Stop reason: SDUPTHER

## 2019-10-29 NOTE — PROGRESS NOTES
Subjective:   Teagan Littlejohn is a 62 y.o. female with hypertension presents for 6 weeks follow up. Patient states she has been without her clonidine patch for 24 hours. Placed on a new patch today. Patient Active Problem List   Diagnosis Code    HTN (hypertension) I10    Hyperlipemia E78.5    Asthma J45.909    Allergic rhinitis J30.9    Obesity E66.9    Chronic shoulder pain V78.967, U03.57    Lichen planus B83.4    Anxiety F41.9    Allergic conjunctivitis H10.10     Current Outpatient Medications   Medication Sig Dispense Refill    rosuvastatin (CRESTOR) 20 mg tablet Take 1 Tab by mouth nightly. 90 Tab 0    ondansetron (ZOFRAN ODT) 8 mg disintegrating tablet Take 1 Tab by mouth every twelve (12) hours as needed for Nausea. 20 Tab 0    AIMOVIG AUTOINJECTOR 70 mg/mL injection INJECT 1 ML BENEATH THE SKIN EVERY 30 DAYS  3    PARoxetine (PAXIL) 10 mg tablet Take 1 Tab by mouth daily. 30 Tab 0    fluticasone propion-salmeterol (ADVAIR/WIXELA) 250-50 mcg/dose diskus inhaler Take 1 Puff by inhalation every twelve (12) hours. 1 Inhaler 3    cloNIDine (CATAPRES) 0.1 mg/24 hr ptwk 1 Patch by TransDERmal route every seven (7) days. 12 Patch 2    acetaminophen (TYLENOL EXTRA STRENGTH) 500 mg tablet Take 1 Tab by mouth every six (6) hours as needed for Pain (not to exceed 4 tabs in 24 hrs). 60 Tab 0    butalbital-acetaminophen-caffeine (FIORICET, ESGIC) -40 mg per tablet Take 1 Tab by mouth every six (6) hours as needed for Pain or Headache. Not to exceed 4 tabs in 24 hrs 40 Tab 1    diphenhydrAMINE (BENADRYL ALLERGY) 25 mg tablet Take 1 Tab by mouth every six (6) hours as needed (allergies). 30 Tab 3    loratadine (CLARITIN REDITABS) 10 mg dissolvable tablet Take 1 Tab by mouth daily. 30 Tab 2    SUMAtriptan (IMITREX) 50 mg tablet Take 1 tabs by mouth at onset of headache.  Then may repeat 1 tab in 2 hrs if needed 30 Tab 0    ibuprofen (MOTRIN) 800 mg tablet Take 1 Tab by mouth every six (6) hours as needed for Pain. 60 Tab 0    albuterol (PROVENTIL HFA, VENTOLIN HFA, PROAIR HFA) 90 mcg/actuation inhaler Take 2 Puffs by inhalation every four (4) hours as needed for Wheezing or Shortness of Breath. 1 Inhaler 6    albuterol (PROVENTIL VENTOLIN) 2.5 mg /3 mL (0.083 %) nebulizer solution 3 mL by Nebulization route every four (4) hours as needed for Wheezing or Shortness of Breath. 2 Package 3    montelukast (SINGULAIR) 5 mg chewable tablet Take 2 Tabs by mouth nightly. 60 Tab 2    Nebulizer & Compressor machine 1 Each by Does Not Apply route every four (4) hours as needed. 1 Each 0      Allergies   Allergen Reactions    [place] Anaphylaxis    [place] Anaphylaxis    Asa-Acetaminophen-Caff-Potass Hives     Just allergic to aspirin only per Pt.  [place] Other (comments)     syncope    [place] Concentrate [Flavoring Agent] Nausea and Vomiting    Zyrtec [Cetirizine] Other (comments)     Dizziness, blurred vision and disoriented     Past Surgical History:   Procedure Laterality Date    HX HYSTERECTOMY  5/16/14     Family History   Problem Relation Age of Onset    Hypertension Mother     Diabetes Mother     Hypertension Sister     Diabetes Maternal Grandmother     Cancer Maternal Aunt         breast at age 54    Cancer Other         lung ca in great grand ma.     No Known Problems Father       Lab Results   Component Value Date/Time    Cholesterol, total 327 (H) 08/20/2019 10:01 AM    HDL Cholesterol 64 (H) 08/20/2019 10:01 AM    LDL, calculated 243 (H) 08/20/2019 10:01 AM    VLDL, calculated 20 08/20/2019 10:01 AM    Triglyceride 100 08/20/2019 10:01 AM    CHOL/HDL Ratio 5.1 (H) 08/20/2019 10:01 AM     Lab Results   Component Value Date/Time    Sodium 138 08/20/2019 10:01 AM    Potassium 4.0 08/20/2019 10:01 AM    Chloride 104 08/20/2019 10:01 AM    CO2 28 08/20/2019 10:01 AM    Anion gap 6 08/20/2019 10:01 AM    Glucose 87 08/20/2019 10:01 AM    BUN 8 08/20/2019 10:01 AM    Creatinine 0.72 08/20/2019 10:01 AM    BUN/Creatinine ratio 11 (L) 08/20/2019 10:01 AM    GFR est AA >60 08/20/2019 10:01 AM    GFR est non-AA >60 08/20/2019 10:01 AM    Calcium 9.2 08/20/2019 10:01 AM    Bilirubin, total 0.5 08/20/2019 10:01 AM    AST (SGOT) 10 08/20/2019 10:01 AM    Alk. phosphatase 159 (H) 08/20/2019 10:01 AM    Protein, total 7.0 08/20/2019 10:01 AM    Albumin 4.0 08/20/2019 10:01 AM    Globulin 3.0 08/20/2019 10:01 AM    A-G Ratio 1.3 08/20/2019 10:01 AM    ALT (SGPT) 14 08/20/2019 10:01 AM     Lab Results   Component Value Date/Time    WBC 5.8 10/11/2013 10:40 AM    HGB 11.9 10/11/2013 10:40 AM    HCT 36.8 10/11/2013 10:40 AM    PLATELET 451 24/89/7037 10:40 AM    MCV 91 10/11/2013 10:40 AM     Wt Readings from Last 3 Encounters:   10/29/19 168 lb (76.2 kg)   10/03/19 165 lb 12.8 oz (75.2 kg)   09/17/19 167 lb (75.8 kg)       BP Readings from Last 3 Encounters:   10/29/19 (!) 150/94   10/29/19 (!) 154/93   10/03/19 107/68       Hypertension ROS: taking medications as instructed, no medication side effects noted, no TIA's, no chest pain on exertion, no dyspnea on exertion, no swelling of ankles. New concerns: patient states after her 3rd dosage of crestor 20 mg she experienced lightheadedness, diaphoresis. Comments she layed down and per patient she was going 'in and out'. States her daughter was with her. Comments symptoms resolved after several hours. Has not had any further symptoms since stopping the medication. States she would like to try crestor at the lowest dosage.      Objective:   Visit Vitals  BP (!) 150/94   Pulse 73   Temp 97.7 °F (36.5 °C) (Oral)   Resp 17   Ht 5' 0.63\" (1.54 m)   Wt 168 lb (76.2 kg)   LMP 05/16/2014   SpO2 98%   BMI 32.13 kg/m²      General appearance - alert, well appearing, and in no distress  Neck - supple, no significant adenopathy, carotids upstroke normal bilaterally, no bruits  Chest - clear to auscultation, no wheezes, rales or rhonchi, symmetric air entry  Heart - normal rate, regular rhythm, normal S1, S2, no murmurs, rubs, clicks or gallops  Extremities - peripheral pulses normal, no pedal edema, no clubbing or cyanosis  Musculoskeletal - tenderness to posterior neck            Assessment/Plan:      ICD-10-CM ICD-9-CM    1. Mixed hyperlipidemia E78.2 272.2    2. Neck pain M54.2 723.1 REFERRAL TO ORTHOPEDICS   3. Dyspareunia in female N94.10 625.0 REFERRAL TO OBSTETRICS AND GYNECOLOGY   4. Encounter for immunization Z23 V03.89 CANCELED: INFLUENZA VIRUS VAC QUAD,SPLIT,PRESV FREE SYRINGE IM     Orders Placed This Encounter    [facility]    REFERRAL TO OBSTETRICS AND GYNECOLOGY    amitriptyline (ELAVIL) 10 mg tablet    erenumab-aooe (AIMOVIG) 140 mg/mL injection    rosuvastatin (CRESTOR) 5 mg tablet    butalbital-acetaminophen-caffeine (FIORICET, ESGIC) -40 mg per tablet    montelukast (SINGULAIR) 5 mg chewable tablet    cloNIDine (CATAPRES) 0.1 mg/24 hr ptwk       Decrease crestor to 5 mg and then reassess  reviewed diet, exercise and weight control. I have discussed the diagnosis with the patient and the intended plan as seen in the above orders. The patient has received an after-visit summary and questions were answered concerning future plans. I have discussed medication side effects and warnings with the patient as well. Patient agreeable with above plan and verbalizes understanding. Follow-up and Dispositions    · Return in about 4 weeks (around 11/26/2019) for HLD since dosage adjustment.

## 2019-10-29 NOTE — PROGRESS NOTES
Chema Solomon presents today for   Chief Complaint   Patient presents with    Hypertension    Cholesterol Problem    Other     neck pain - wants referral before she does xray       Is someone accompanying this pt? no    Is the patient using any DME equipment during OV? no    Depression Screening:  3 most recent PHQ Screens 10/3/2019   Little interest or pleasure in doing things Not at all   Feeling down, depressed, irritable, or hopeless Not at all   Total Score PHQ 2 0   Trouble falling or staying asleep, or sleeping too much -   Feeling tired or having little energy -   Poor appetite, weight loss, or overeating -   Feeling bad about yourself - or that you are a failure or have let yourself or your family down -   Trouble concentrating on things such as school, work, reading, or watching TV -   Moving or speaking so slowly that other people could have noticed; or the opposite being so fidgety that others notice -   Thoughts of being better off dead, or hurting yourself in some way -   PHQ 9 Score -   How difficult have these problems made it for you to do your work, take care of your home and get along with others -       Learning Assessment:  Learning Assessment 5/22/2015   PRIMARY LEARNER Patient   HIGHEST LEVEL OF EDUCATION - PRIMARY LEARNER  -   BARRIERS PRIMARY LEARNER -   CO-LEARNER CAREGIVER -   PRIMARY LANGUAGE ENGLISH   LEARNER PREFERENCE PRIMARY LISTENING     READING     DEMONSTRATION     -   ANSWERED BY patient   RELATIONSHIP SELF       Abuse Screening:  Abuse Screening Questionnaire 2/23/2018   Do you ever feel afraid of your partner? N   Are you in a relationship with someone who physically or mentally threatens you? N   Is it safe for you to go home? Y       Fall Risk  No flowsheet data found. Health Maintenance reviewed and discussed and ordered per Provider.     Health Maintenance Due   Topic Date Due    Hepatitis C Screening  1965    Pneumococcal 0-64 years (1 of 1 - PPSV23) 03/30/1969    Shingrix Vaccine Age 50> (1 of 2) 03/30/2013    FOBT Q 1 YEAR AGE 50-75  03/30/2013    PAP AKA CERVICAL CYTOLOGY  08/12/2013    Influenza Age 9 to Adult  08/01/2019           Coordination of Care:  1. Have you been to the ER, urgent care clinic since your last visit? Hospitalized since your last visit? no    2. Have you seen or consulted any other health care providers outside of the [address] [place] [address] since your last visit? Include any pap smears or colon screening.  no

## 2019-12-23 RX ORDER — ROSUVASTATIN CALCIUM 5 MG/1
5 TABLET, COATED ORAL
Qty: 90 TAB | Refills: 1 | Status: SHIPPED | OUTPATIENT
Start: 2019-12-23 | End: 2019-12-23 | Stop reason: SDUPTHER

## 2019-12-23 RX ORDER — ROSUVASTATIN CALCIUM 5 MG/1
5 TABLET, COATED ORAL
Qty: 90 TAB | Refills: 1 | Status: SHIPPED | OUTPATIENT
Start: 2019-12-23 | End: 2020-01-10 | Stop reason: SDUPTHER

## 2019-12-23 NOTE — TELEPHONE ENCOUNTER
Requested Prescriptions     Pending Prescriptions Disp Refills    rosuvastatin (CRESTOR) 5 mg tablet 30 Tab 0     Sig: Take 1 Tab by mouth nightly.

## 2020-01-10 ENCOUNTER — OFFICE VISIT (OUTPATIENT)
Dept: FAMILY MEDICINE CLINIC | Age: 55
End: 2020-01-10

## 2020-01-10 ENCOUNTER — HOSPITAL ENCOUNTER (OUTPATIENT)
Dept: LAB | Age: 55
Discharge: HOME | End: 2020-01-10
Payer: OTHER GOVERNMENT

## 2020-01-10 VITALS
SYSTOLIC BLOOD PRESSURE: 127 MMHG | RESPIRATION RATE: 18 BRPM | DIASTOLIC BLOOD PRESSURE: 83 MMHG | HEART RATE: 87 BPM | HEIGHT: 61 IN | TEMPERATURE: 97.5 F | OXYGEN SATURATION: 99 % | WEIGHT: 180 LBS | BODY MASS INDEX: 33.99 KG/M2

## 2020-01-10 DIAGNOSIS — K59.00 CONSTIPATION, UNSPECIFIED CONSTIPATION TYPE: ICD-10-CM

## 2020-01-10 DIAGNOSIS — E78.2 MIXED HYPERLIPIDEMIA: Primary | ICD-10-CM

## 2020-01-10 DIAGNOSIS — Z23 ENCOUNTER FOR IMMUNIZATION: ICD-10-CM

## 2020-01-10 DIAGNOSIS — E78.2 MIXED HYPERLIPIDEMIA: ICD-10-CM

## 2020-01-10 LAB
ALBUMIN SERPL-MCNC: 3.6 G/DL (ref 3.4–5)
ALBUMIN/GLOB SERPL: 1.1 {RATIO} (ref 0.8–1.7)
ALP SERPL-CCNC: 157 U/L (ref 45–117)
ALT SERPL-CCNC: 12 U/L (ref 13–56)
ANION GAP SERPL CALC-SCNC: 2 MMOL/L (ref 3–18)
AST SERPL-CCNC: 11 U/L (ref 10–38)
BILIRUB SERPL-MCNC: 0.3 MG/DL (ref 0.2–1)
BUN SERPL-MCNC: 8 MG/DL (ref 7–18)
BUN/CREAT SERPL: 11 (ref 12–20)
CALCIUM SERPL-MCNC: 8.9 MG/DL (ref 8.5–10.1)
CHLORIDE SERPL-SCNC: 111 MMOL/L (ref 100–111)
CHOLEST SERPL-MCNC: 298 MG/DL
CO2 SERPL-SCNC: 27 MMOL/L (ref 21–32)
CREAT SERPL-MCNC: 0.74 MG/DL (ref 0.6–1.3)
GLOBULIN SER CALC-MCNC: 3.4 G/DL (ref 2–4)
GLUCOSE SERPL-MCNC: 74 MG/DL (ref 74–99)
HDLC SERPL-MCNC: 66 MG/DL (ref 40–60)
HDLC SERPL: 4.5 {RATIO} (ref 0–5)
LDLC SERPL CALC-MCNC: 211.6 MG/DL (ref 0–100)
LIPID PROFILE,FLP: ABNORMAL
POTASSIUM SERPL-SCNC: 4.4 MMOL/L (ref 3.5–5.5)
PROT SERPL-MCNC: 7 G/DL (ref 6.4–8.2)
SODIUM SERPL-SCNC: 140 MMOL/L (ref 136–145)
TRIGL SERPL-MCNC: 102 MG/DL (ref ?–150)
VLDLC SERPL CALC-MCNC: 20.4 MG/DL

## 2020-01-10 PROCEDURE — 80053 COMPREHEN METABOLIC PANEL: CPT

## 2020-01-10 PROCEDURE — 36415 COLL VENOUS BLD VENIPUNCTURE: CPT

## 2020-01-10 PROCEDURE — 80061 LIPID PANEL: CPT

## 2020-01-10 RX ORDER — ROSUVASTATIN CALCIUM 5 MG/1
5 TABLET, COATED ORAL
Qty: 90 TAB | Refills: 1 | Status: SHIPPED | OUTPATIENT
Start: 2020-01-10 | End: 2020-05-27 | Stop reason: SDUPTHER

## 2020-01-10 NOTE — PROGRESS NOTES
Subjective:   Alexandru Fisher is a 62 y.o. female with Hypercholesterolemia presents for follow up. Hypercholesterolemia ROS: Medication:  crestor, which is/are control uncertain. Cardiovascular ROS - taking medications as instructed, no medication side effects noted, no TIA's, no chest pain on exertion, no dyspnea on exertion, no swelling of ankles. Patient reports she didn't have any further refills. Comments she didn't get a call for her refill. States she was able to tolerate the lower dose of crestor. Other symptoms and concerns: comments she has gained 10 lbs. Comments she thinks it may be related to her medications. Comments she also is having constipation. Current Outpatient Medications   Medication Sig Dispense Refill    rosuvastatin (CRESTOR) 5 mg tablet Take 1 Tab by mouth nightly. 90 Tab 1    amitriptyline (ELAVIL) 10 mg tablet Take 10 mg by mouth nightly.  erenumab-aooe (AIMOVIG) 140 mg/mL injection 140 mg by SubCUTAneous route every month.  butalbital-acetaminophen-caffeine (FIORICET, ESGIC) -40 mg per tablet Take 1 Tab by mouth every six (6) hours as needed for Pain or Headache. Not to exceed 4 tabs in 24 hrs 40 Tab 1    montelukast (SINGULAIR) 5 mg chewable tablet Take 2 Tabs by mouth nightly. 60 Tab 2    cloNIDine (CATAPRES) 0.1 mg/24 hr ptwk 1 Patch by TransDERmal route every seven (7) days. 12 Patch 2    Transparent Dressings (TEGADERM FRAME STYLE) 2 3/8 X 2 3/4 \" bndg To use with clonidine patch weekly 20 Each 1    ondansetron (ZOFRAN ODT) 8 mg disintegrating tablet Take 1 Tab by mouth every twelve (12) hours as needed for Nausea. 20 Tab 0    fluticasone propion-salmeterol (ADVAIR/WIXELA) 250-50 mcg/dose diskus inhaler Take 1 Puff by inhalation every twelve (12) hours. 1 Inhaler 3    acetaminophen (TYLENOL EXTRA STRENGTH) 500 mg tablet Take 1 Tab by mouth every six (6) hours as needed for Pain (not to exceed 4 tabs in 24 hrs).  60 Tab 0    diphenhydrAMINE (BENADRYL ALLERGY) 25 mg tablet Take 1 Tab by mouth every six (6) hours as needed (allergies). 30 Tab 3    loratadine (CLARITIN REDITABS) 10 mg dissolvable tablet Take 1 Tab by mouth daily. 30 Tab 2    SUMAtriptan (IMITREX) 50 mg tablet Take 1 tabs by mouth at onset of headache. Then may repeat 1 tab in 2 hrs if needed 30 Tab 0    ibuprofen (MOTRIN) 800 mg tablet Take 1 Tab by mouth every six (6) hours as needed for Pain. 60 Tab 0    albuterol (PROVENTIL HFA, VENTOLIN HFA, PROAIR HFA) 90 mcg/actuation inhaler Take 2 Puffs by inhalation every four (4) hours as needed for Wheezing or Shortness of Breath. 1 Inhaler 6    albuterol (PROVENTIL VENTOLIN) 2.5 mg /3 mL (0.083 %) nebulizer solution 3 mL by Nebulization route every four (4) hours as needed for Wheezing or Shortness of Breath. 2 Package 3    Nebulizer & Compressor machine 1 Each by Does Not Apply route every four (4) hours as needed. 1 Each 0      Past Medical History:   Diagnosis Date    Allergic rhinitis 7/16/2010    Asthma     Chronic shoulder pain 7/16/2010    HTN (hypertension) 7/16/2010    Hyperlipemia 1/27/3077    Lichen planus 9/0/3007    Obesity 7/16/2010     Family History   Problem Relation Age of Onset    Hypertension Mother     Diabetes Mother     Hypertension Sister     Diabetes Maternal Grandmother     Cancer Maternal Aunt         breast at age 54    Cancer Other         lung ca in great grand ma.     No Known Problems Father      Lab Results   Component Value Date/Time    Cholesterol, total 327 (H) 08/20/2019 10:01 AM    HDL Cholesterol 64 (H) 08/20/2019 10:01 AM    LDL, calculated 243 (H) 08/20/2019 10:01 AM    VLDL, calculated 20 08/20/2019 10:01 AM    Triglyceride 100 08/20/2019 10:01 AM    CHOL/HDL Ratio 5.1 (H) 08/20/2019 10:01 AM     Lab Results   Component Value Date/Time    Sodium 138 08/20/2019 10:01 AM    Potassium 4.0 08/20/2019 10:01 AM    Chloride 104 08/20/2019 10:01 AM    CO2 28 08/20/2019 10:01 AM    Anion gap 6 08/20/2019 10:01 AM    Glucose 87 08/20/2019 10:01 AM    BUN 8 08/20/2019 10:01 AM    Creatinine 0.72 08/20/2019 10:01 AM    BUN/Creatinine ratio 11 (L) 08/20/2019 10:01 AM    GFR est AA >60 08/20/2019 10:01 AM    GFR est non-AA >60 08/20/2019 10:01 AM    Calcium 9.2 08/20/2019 10:01 AM    Bilirubin, total 0.5 08/20/2019 10:01 AM    AST (SGOT) 10 08/20/2019 10:01 AM    Alk. phosphatase 159 (H) 08/20/2019 10:01 AM    Protein, total 7.0 08/20/2019 10:01 AM    Albumin 4.0 08/20/2019 10:01 AM    Globulin 3.0 08/20/2019 10:01 AM    A-G Ratio 1.3 08/20/2019 10:01 AM    ALT (SGPT) 14 08/20/2019 10:01 AM     Lab Results   Component Value Date/Time    WBC 5.8 10/11/2013 10:40 AM    HGB 11.9 10/11/2013 10:40 AM    HCT 36.8 10/11/2013 10:40 AM    PLATELET 865 90/65/6131 10:40 AM    MCV 91 10/11/2013 10:40 AM     Wt Readings from Last 3 Encounters:   01/10/20 180 lb (81.6 kg)   10/29/19 168 lb (76.2 kg)   10/03/19 165 lb 12.8 oz (75.2 kg)       Objective:   Visit Vitals  /83 (BP 1 Location: Right arm, BP Patient Position: Sitting)   Pulse 87   Temp 97.5 °F (36.4 °C) (Oral)   Resp 18   Ht 5' 0.63\" (1.54 m)   Wt 180 lb (81.6 kg)   LMP 05/16/2014   SpO2 99%   BMI 34.43 kg/m²     General appearance - alert, well appearing, and in no distress  Neck - supple, no significant adenopathy, carotids upstroke normal bilaterally, no bruits  Chest - clear to auscultation, no wheezes, rales or rhonchi, symmetric air entry  Heart - normal rate, regular rhythm, normal S1, S2, no murmurs, rubs, clicks or gallops  Extremities - peripheral pulses normal, no pedal edema, no clubbing or cyanosis      Assessment/Plan:      ICD-10-CM ICD-9-CM    1. Mixed hyperlipidemia E78.2 272.2 LIPID PANEL      METABOLIC PANEL, COMPREHENSIVE   2. Constipation, unspecified constipation type K59.00 564.00    3.  Encounter for immunization Z23 V03.89 ZOSTER VACC RECOMBINANT ADJUVANTED     Orders Placed This Encounter    ZOSTER [facility] ADJUVANTED    LIPID PANEL    METABOLIC PANEL, COMPREHENSIVE    rosuvastatin (CRESTOR) 5 mg tablet       Advised she may use over the counter pericolace to help with constipation  I have discussed the diagnosis with the patient and the intended plan as seen in the above orders. The patient has received an after-visit summary and questions were answered concerning future plans. I have discussed medication side effects and warnings with the patient as well. Pt verbalizes understanding. Patient agreeable with above plan and verbalizes understanding. Follow-up and Dispositions    · Return in about 3 months (around 4/10/2020) for HLD.

## 2020-01-10 NOTE — PROGRESS NOTES
Chief Complaint   Patient presents with    Weight Gain     1. Have you been to the ER, urgent care clinic since your last visit? Hospitalized since your last visit? no    2. Have you seen or consulted any other health care providers outside of the [address] [place] [address] since your last visit? Include any pap smears or colon screening. No           Patient is well today. No acute distress or concerns noted at this time. Patient received VIS statement. No questions or concerns verbalized at this time. Patient consents to receiving vaccination. Patient received  vaccination. Patient tolerated well. General comfort measures in regards to injection site given. Patient verbalized understanding. Patient was observed for 20 minutes. No adverse effects noted. Patient left ambulatory with no complaints of pain or distress noted at this time.

## 2020-01-10 NOTE — PATIENT INSTRUCTIONS
Constipation: Care Instructions  Your Care Instructions    Constipation means that you have a hard time passing stools (bowel movements). People pass stools from 3 times a day to once every 3 days. What is normal for you may be different. Constipation may occur with pain in the rectum and cramping. The pain may get worse when you try to pass stools. Sometimes there are small amounts of bright red blood on toilet paper or the surface of stools. This is because of enlarged veins near the rectum (hemorrhoids). A few changes in your diet and lifestyle may help you avoid ongoing constipation. Your doctor may also prescribe medicine to help loosen your stool. Some medicines can cause constipation. These include pain medicines and antidepressants. Tell your doctor about all the medicines you take. Your doctor may want to make a medicine change to ease your symptoms. Follow-up care is a key part of your treatment and safety. Be sure to make and go to all appointments, and call your doctor if you are having problems. It's also a good idea to know your test results and keep a list of the medicines you take. How can you care for yourself at home? · Drink plenty of fluids, enough so that your urine is light yellow or clear like water. If you have kidney, heart, or liver disease and have to limit fluids, talk with your doctor before you increase the amount of fluids you drink. · Include high-fiber foods in your diet each day. These include fruits, vegetables, beans, and whole grains. · Get at least 30 minutes of exercise on most days of the week. Walking is a good choice. You also may want to do other activities, such as running, swimming, cycling, or playing tennis or team sports. · Take a fiber supplement, such as Citrucel or Metamucil, every day. Read and follow all instructions on the label. · Schedule time each day for a bowel movement. A daily routine may help.  Take your time having your bowel movement. · Support your feet with a small step stool when you sit on the toilet. This helps flex your hips and places your pelvis in a squatting position. · Your doctor may recommend an over-the-counter laxative to relieve your constipation. Examples are Milk of Magnesia and MiraLax. Read and follow all instructions on the label. Do not use laxatives on a long-term basis. When should you call for help? Call your doctor now or seek immediate medical care if:    · You have new or worse belly pain.     · You have new or worse nausea or vomiting.     · You have blood in your stools.    Watch closely for changes in your health, and be sure to contact your doctor if:    · Your constipation is getting worse.     · You do not get better as expected. Where can you learn more? Go to http://digna-markel.info/. Enter 21 [phone] in the search box to learn more about \"Constipation: Care Instructions. \"  Current as of: June 26, 2019  Content Version: 12.2  © 3335-2751 [employer]. Care instructions adapted under license by [employer] (which disclaims liability or warranty for this information). If you have questions about a medical condition or this instruction, always ask your healthcare professional. [place] [address] any warranty or liability for your use of this information. Laxative, Stimulant Combination (By mouth)   Treats constipation by helping you have a bowel movement. Brand Name(s): Colace, Doc-Q-Lax, Dok Plus, [facility] Stool Softener & Laxative, Good Sense Stimulant Laxative Plus, Laxacin, Medi-Laxx, Ana-Colace, [employer] Laxative and Stool Softener, [facility] P Col-Rite, Senexon-S, Senna-S, Senna-Time S, SennaLax-S, SennaPrompt   There may be other brand names for this medicine. When This Medicine Should Not Be Used:    You should not use this medicine if you have had an allergic reaction to senna, sennosides, docusate, casanthranol, or psyllium. Some brand names for these medicines are Correctol® stool softener, Ex-Lax®, Colace®, or Metamucil®. Make sure your doctor knows if you are allergic to any other laxative medicines. How to Use This Medicine:   Tablet, Liquid Filled Capsule, Liquid, Granule, Capsule, Powder for Suspension  · Your doctor will tell you how much medicine to use. Do not use more than directed. · If you have had a sudden change in your bowel movements in the past two weeks, ask your doctor before using this medicine. · Follow the instructions on the medicine label if you are using this medicine without a prescription. · Drink a full glass of water when you take this medicine. One full glass of water is about 8 ounces or 1 cup. Drinking 6 to 8 full glasses of water every day will help keep your bowel movements soft. · You might need to mix the granules or powder with water before you take each dose. Drink this mixture right away. Do not swallow the granules or powder dry unless the directions say you can. · Measure the oral liquid medicine with a marked measuring spoon, oral syringe, or medicine cup. · Use this medicine at bedtime, unless your doctor tells you otherwise. If a dose is missed:   · Take a dose as soon as you remember. If it is almost time for your next dose, wait until then and take a regular dose. Do not take extra medicine to make up for a missed dose. How to Store and Dispose of This Medicine:   · Store the medicine in a closed container at room temperature, away from heat, moisture, and direct light. · Ask your pharmacist, doctor, or health caregiver about the best way to dispose of any outdated medicine or medicine no longer needed. · Keep all medicine out of the reach of children. Never share your medicine with anyone. Drugs and Foods to Avoid:   Ask your doctor or pharmacist before using any other medicine, including over-the-counter medicines, vitamins, and herbal products.   · Make sure your doctor knows if you are also using mineral oil. · Some laxatives need to be taken 2 hours before or 2 hours after other medicines. If you need to take any other medicine, ask your health caregiver if you need to follow a special schedule. Warnings While Using This Medicine:   · Make sure your doctor knows if you are pregnant or breast feeding. · Do not use this medicine if you have stomach pain, nausea, or vomiting, unless your health caregiver tells you to use it. · If you do not have a bowel movement after using this medicine, talk to your doctor. Most people will have a bowel movement within 6 to 12 hours after using this laxative. · You should not use this laxative for more than 1 week unless your doctor says it is okay. Laxatives may be habit-forming and can harm your bowels if you use them too long. Possible Side Effects While Using This Medicine:   Call your doctor right away if you notice any of these side effects:  · Bleeding from your rectum. · Skin rash. · Urine turns a different color. If you notice these less serious side effects, talk with your doctor:   · Diarrhea, cramps, nausea, burping. If you notice other side effects that you think are caused by this medicine, tell your doctor. Call your doctor for medical advice about side effects. You may report side effects to FDA at 1-753-FDA-9296  © 2017 [facility]– [place] Information is for End User's use only and may not be sold, redistributed or otherwise used for commercial purposes. The above information is an  only. It is not intended as medical advice for individual conditions or treatments. Talk to your doctor, nurse or pharmacist before following any medical regimen to see if it is safe and effective for you.

## 2020-02-18 RX ORDER — MONTELUKAST SODIUM 5 MG/1
10 TABLET, CHEWABLE ORAL
Qty: 60 TAB | Refills: 2 | Status: SHIPPED | OUTPATIENT
Start: 2020-02-18 | End: 2020-05-27 | Stop reason: SDUPTHER

## 2020-02-18 RX ORDER — ONDANSETRON 8 MG/1
8 TABLET, ORALLY DISINTEGRATING ORAL
Qty: 20 TAB | Refills: 0 | Status: SHIPPED | OUTPATIENT
Start: 2020-02-18 | End: 2020-09-11 | Stop reason: SDUPTHER

## 2020-02-18 NOTE — TELEPHONE ENCOUNTER
Last Visit: 1/10/20 with CRISTEL Albarado  Next Appointment: 4/10/20 with CRISTEL Albarado  Previous Refill Encounter(s): 9/17/19 Zofran #20, 10/29/19 Singulair #60 with 2 refills    Requested Prescriptions     Pending Prescriptions Disp Refills    ondansetron (ZOFRAN ODT) 8 mg disintegrating tablet 20 Tab 0     Sig: Take 1 Tab by mouth every twelve (12) hours as needed for Nausea.  montelukast (SINGULAIR) 5 mg chewable tablet 60 Tab 2     Sig: Take 2 Tabs by mouth nightly.

## 2020-02-18 NOTE — TELEPHONE ENCOUNTER
Patient needs a refill on these meds, she is having drainage and mucus and having to take benadryl every night which keeps her up all night.   lov 1/10/2020  Nov 4/10/2020  Last refill 9/17/19

## 2020-03-10 ENCOUNTER — OFFICE VISIT (OUTPATIENT)
Dept: OBGYN CLINIC | Age: 55
End: 2020-03-10

## 2020-03-10 VITALS
RESPIRATION RATE: 20 BRPM | HEART RATE: 83 BPM | WEIGHT: 180 LBS | SYSTOLIC BLOOD PRESSURE: 133 MMHG | BODY MASS INDEX: 35.34 KG/M2 | OXYGEN SATURATION: 99 % | DIASTOLIC BLOOD PRESSURE: 86 MMHG | HEIGHT: 60 IN

## 2020-03-10 DIAGNOSIS — E66.01 SEVERE OBESITY (HCC): ICD-10-CM

## 2020-03-10 DIAGNOSIS — Z12.11 COLON CANCER SCREENING: Primary | ICD-10-CM

## 2020-03-10 DIAGNOSIS — Z12.39 BREAST CANCER SCREENING: ICD-10-CM

## 2020-03-10 DIAGNOSIS — Z12.4 CERVICAL CANCER SCREENING: ICD-10-CM

## 2020-03-10 DIAGNOSIS — N94.10 DYSPAREUNIA IN FEMALE: ICD-10-CM

## 2020-03-10 NOTE — PROGRESS NOTES
1. Have you been to the ER, urgent care clinic since your last visit? Hospitalized since your last visit? No    2. Have you seen or consulted any other health care providers outside of the [address] [place] [address] since your last visit? Include any pap smears or colon screening.  No   Visit Vitals  /86   Pulse 83   Resp 20   Ht 5' (1.524 m)   Wt 180 lb (81.6 kg)   LMP 05/16/2014   SpO2 99%   BMI 35.15 kg/m²

## 2020-03-10 NOTE — PROGRESS NOTES
SUBJECTIVE:   62 y.o. female for annual routine Pap and checkup. Current Outpatient Medications   Medication Sig Dispense Refill    ospemifene (OSPHENA) 60 mg tab tablet Take 1 Tab by mouth daily. 90 Tab 3    polyethylene glycol (MIRALAX) 17 gram/dose powder Take 17 g by mouth daily. 595 g 0    ondansetron (ZOFRAN ODT) 8 mg disintegrating tablet Take 1 Tab by mouth every twelve (12) hours as needed for Nausea. 20 Tab 0    montelukast (SINGULAIR) 5 mg chewable tablet Take 2 Tabs by mouth nightly. 60 Tab 2    rosuvastatin (CRESTOR) 5 mg tablet Take 1 Tab by mouth nightly. 90 Tab 1    amitriptyline (ELAVIL) 10 mg tablet Take 10 mg by mouth nightly.  erenumab-aooe (AIMOVIG) 140 mg/mL injection 140 mg by SubCUTAneous route every month.  butalbital-acetaminophen-caffeine (FIORICET, ESGIC) -40 mg per tablet Take 1 Tab by mouth every six (6) hours as needed for Pain or Headache. Not to exceed 4 tabs in 24 hrs 40 Tab 1    cloNIDine (CATAPRES) 0.1 mg/24 hr ptwk 1 Patch by TransDERmal route every seven (7) days. 12 Patch 2    Transparent Dressings (TEGADERM FRAME STYLE) 2 3/8 X 2 3/4 \" bndg To use with clonidine patch weekly 20 Each 1    fluticasone propion-salmeterol (ADVAIR/WIXELA) 250-50 mcg/dose diskus inhaler Take 1 Puff by inhalation every twelve (12) hours. 1 Inhaler 3    acetaminophen (TYLENOL EXTRA STRENGTH) 500 mg tablet Take 1 Tab by mouth every six (6) hours as needed for Pain (not to exceed 4 tabs in 24 hrs). 60 Tab 0    diphenhydrAMINE (BENADRYL ALLERGY) 25 mg tablet Take 1 Tab by mouth every six (6) hours as needed (allergies). 30 Tab 3    loratadine (CLARITIN REDITABS) 10 mg dissolvable tablet Take 1 Tab by mouth daily. 30 Tab 2    SUMAtriptan (IMITREX) 50 mg tablet Take 1 tabs by mouth at onset of headache. Then may repeat 1 tab in 2 hrs if needed 30 Tab 0    ibuprofen (MOTRIN) 800 mg tablet Take 1 Tab by mouth every six (6) hours as needed for Pain.  60 Tab 0    albuterol (PROVENTIL HFA, VENTOLIN HFA, PROAIR HFA) 90 mcg/actuation inhaler Take 2 Puffs by inhalation every four (4) hours as needed for Wheezing or Shortness of Breath. 1 Inhaler 6    albuterol (PROVENTIL VENTOLIN) 2.5 mg /3 mL (0.083 %) nebulizer solution 3 mL by Nebulization route every four (4) hours as needed for Wheezing or Shortness of Breath. 2 Package 3    Nebulizer & Compressor machine 1 Each by Does Not Apply route every four (4) hours as needed. 1 Each 0     Allergies: Aris Booty; Cherry; Asa-acetaminophen-caff-potass; Orange; [place] concentrate [place] ]; and Zyrtec [cetirizine]   Patient's last menstrual period was 05/16/2014. ROS:  Feeling well. No dyspnea or chest pain on exertion. No abdominal pain, change in bowel habits, black or bloody stools. No urinary tract symptoms. GYN ROS: normal menses, no abnormal bleeding, pelvic pain or discharge, no breast pain or new or enlarging lumps on self exam. No neurological complaints. OBJECTIVE:   The patient appears well, alert, oriented x 3, in no distress. Visit Vitals  /86   Pulse 83   Resp 20   Ht 5' (1.524 m)   Wt 180 lb (81.6 kg)   LMP 05/16/2014   SpO2 99%   BMI 35.15 kg/m²     ENT normal.  Neck supple. No adenopathy or thyromegaly. MONA. Lungs are clear, good air entry, no wheezes, rhonchi or rales. S1 and S2 normal, no murmurs, regular rate and rhythm. Abdomen soft without tenderness, guarding, mass or organomegaly. Extremities show no edema, normal peripheral pulses. Neurological is normal, no focal findings.     BREAST EXAM: breasts appear normal, no suspicious masses, no skin or nipple changes or axillary nodes    PELVIC EXAM: normal external genitalia, vulva,  Cervix, uterus absent vagina with loss of rugosity and elasticity    ASSESSMENT:   Well woman      PLAN:   Pap smear not required anymore as patient had a hysterectomy for benign reasons  Mammogram ordered today  Colonoscopy up to date  Dyspareunia: Gisel Rae in lieu of Estrogen orally due to patient's comorbidity of HTN and problems with headaches requiring injections as treatment.   F/U 1 month for reevaluation of how she is doing with painful intercourse on Osphena

## 2020-03-10 NOTE — PATIENT INSTRUCTIONS
Well Visit, Women 48 to 72: Care Instructions Your Care Instructions Physical exams can help you stay healthy. Your doctor has checked your overall health and may have suggested ways to take good care of yourself. He or she also may have recommended tests. At home, you can help prevent illness with healthy eating, regular exercise, and other steps. Follow-up care is a key part of your treatment and safety. Be sure to make and go to all appointments, and call your doctor if you are having problems. It's also a good idea to know your test results and keep a list of the medicines you take. How can you care for yourself at home? · Reach and stay at a healthy weight. This will lower your risk for many problems, such as obesity, diabetes, heart disease, and high blood pressure. · Get at least 30 minutes of exercise on most days of the week. Walking is a good choice. You also may want to do other activities, such as running, swimming, cycling, or playing tennis or team sports. · Do not smoke. Smoking can make health problems worse. If you need help quitting, talk to your doctor about stop-smoking programs and medicines. These can increase your chances of quitting for good. · Protect your skin from too much sun. When you're outdoors from 10 a.m. to 4 p.m., stay in the shade or cover up with clothing and a hat with a wide brim. Wear sunglasses that block UV rays. Even when it's cloudy, put broad-spectrum sunscreen (SPF 30 or higher) on any exposed skin. · See a dentist one or two times a year for checkups and to have your teeth cleaned. · Wear a seat belt in the car. Follow your doctor's advice about when to have certain tests. These tests can spot problems early. · Cholesterol. Your doctor will tell you how often to have this done based on your age, family history, or other things that can increase your risk for heart attack and stroke. · Blood pressure. Have your blood pressure checked during a routine doctor visit. Your doctor will tell you how often to check your blood pressure based on your age, your blood pressure results, and other factors. · Mammogram. Ask your doctor how often you should have a mammogram, which is an X-ray of your breasts. A mammogram can spot breast cancer before it can be felt and when it is easiest to treat. · Pap test and pelvic exam. Ask your doctor how often you should have a Pap test. You may not need to have a Pap test as often as you used to. · Vision. Have your eyes checked every year or two or as often as your doctor suggests. Some experts recommend that you have yearly exams for glaucoma and other age-related eye problems starting at age 48. · Hearing. Tell your doctor if you notice any change in your hearing. You can have tests to find out how well you hear. · Diabetes. Ask your doctor whether you should have tests for diabetes. · Colorectal cancer. Your risk for colorectal cancer gets higher as you get older. Some experts say that adults should start regular screening at age 48 and stop at age 76. Others say to start before age 48 or continue after age 76. Talk with your doctor about your risk and when to start and stop screening. · Thyroid disease. Talk to your doctor about whether to have your thyroid checked as part of a regular physical exam. Women have an increased chance of a thyroid problem. · Osteoporosis. You should begin tests for bone density at age 72. If you are younger than 72, ask your doctor whether you have factors that may increase your risk for this disease. You may want to have this test before age 72. · Heart attack and stroke risk. At least every 4 to 6 years, you should have your risk for heart attack and stroke assessed.  Your doctor uses factors such as your age, blood pressure, cholesterol, and whether you smoke or have diabetes to show what your risk for a heart attack or stroke is over the next 10 years. When should you call for help? Watch closely for changes in your health, and be sure to contact your doctor if you have any problems or symptoms that concern you. Where can you learn more? Go to http://digna-markel.info/. Enter E292 in the search box to learn more about \"Well Visit, Women 50 to 72: Care Instructions. \" Current as of: December 13, 2018 Content Version: 12.2 © 4894-3620 [employer]. Care instructions adapted under license by [employer] (which disclaims liability or warranty for this information). If you have questions about a medical condition or this instruction, always ask your healthcare professional. [address] any warranty or liability for your use of this information.

## 2020-03-12 ENCOUNTER — OFFICE VISIT (OUTPATIENT)
Dept: ORTHOPEDIC SURGERY | Age: 55
End: 2020-03-12

## 2020-03-12 VITALS
DIASTOLIC BLOOD PRESSURE: 84 MMHG | WEIGHT: 182.6 LBS | HEART RATE: 87 BPM | OXYGEN SATURATION: 100 % | BODY MASS INDEX: 34.48 KG/M2 | RESPIRATION RATE: 24 BRPM | SYSTOLIC BLOOD PRESSURE: 120 MMHG | TEMPERATURE: 98 F | HEIGHT: 61 IN

## 2020-03-12 DIAGNOSIS — M47.812 CERVICAL SPONDYLOSIS WITHOUT MYELOPATHY: ICD-10-CM

## 2020-03-12 DIAGNOSIS — M54.2 NECK PAIN: Primary | ICD-10-CM

## 2020-03-12 NOTE — LETTER
3/12/20 Patient: Brock Libman YOB: 1965 Date of Visit: 3/12/2020 Jessenia Mills NP 
[address] [facility] [address] [address] VIA In Basket Dear Jessenia Mills NP, Thank you for referring Ms. Jimi Chow to [address] for evaluation. My notes for this consultation are attached. If you have questions, please do not hesitate to call me. I look forward to following your patient along with you. Sincerely, Suha Cardoso MD

## 2020-03-12 NOTE — PROGRESS NOTES
[facility]  [address], [address]  Phone: [phone]  Fax: [phone]        INITIAL CONSULTATION      HISTORY OF PRESENT ILLNESS:  Daksha Duffy is a 62 y.o. female whom is referred from Mechelle MARTINEZ NP secondary to neck pain x 2018. She rates her pain 0/10. Patient reports her pain was progressive until recently and reports being pain free x 2 days. She denies previous episodes of no relief. Additionally, she primarily had complaints of a popping sensation, which has also resolved. She has treated with NSAIDs with some benefit. Patient denies previous spinal surgery or injections. She previously completed PT for radicular neck pain in 2016 after an injury. Pt denies dropping things or loss of balance. Pt denies fever, weight loss, or skin changes. PmHx of HTN. Note from Dr. Marianna Leong dated 10/3/19 indicating patient was seen with c/o bilateral trigger thumbs. Treated with injections. Note from Remedios Connell NP dated 10/29/19 indicating patient was seen for hypertension. Indicated she had neck pain at the time. Referred her to spine. Note from Jonh Granda [place], neurology dated 12/3/19 indicating patient was seen with c/o migraine HAs. The patient is RHD.  reviewed. Body mass index is 34.5 kg/m².     PCP: Remedios Connell NP    Past Medical History:   Diagnosis Date    Allergic rhinitis 7/16/2010    Asthma     Chronic shoulder pain 7/16/2010    HTN (hypertension) 7/16/2010    Hyperlipemia 7/82/7265    Lichen planus 1/8/2753    Obesity 7/16/2010          Past Surgical History:   Procedure Laterality Date    HX HYSTERECTOMY  5/16/14    HX HYSTERECTOMY           Social History     Tobacco Use    Smoking status: Never Smoker    Smokeless tobacco: Never Used   Substance Use Topics    Alcohol use: No       Current Outpatient Medications   Medication Sig Dispense Refill    ospemifene (OSPHENA) 60 mg tab tablet Take 1 Tab by mouth daily. 90 Tab 3    polyethylene glycol (MIRALAX) 17 gram/dose powder Take 17 g by mouth daily. 595 g 0    ondansetron (ZOFRAN ODT) 8 mg disintegrating tablet Take 1 Tab by mouth every twelve (12) hours as needed for Nausea. 20 Tab 0    montelukast (SINGULAIR) 5 mg chewable tablet Take 2 Tabs by mouth nightly. 60 Tab 2    rosuvastatin (CRESTOR) 5 mg tablet Take 1 Tab by mouth nightly. 90 Tab 1    amitriptyline (ELAVIL) 10 mg tablet Take 10 mg by mouth nightly.  erenumab-aooe (AIMOVIG) 140 mg/mL injection 140 mg by SubCUTAneous route every month.  butalbital-acetaminophen-caffeine (FIORICET, ESGIC) -40 mg per tablet Take 1 Tab by mouth every six (6) hours as needed for Pain or Headache. Not to exceed 4 tabs in 24 hrs 40 Tab 1    cloNIDine (CATAPRES) 0.1 mg/24 hr ptwk 1 Patch by TransDERmal route every seven (7) days. 12 Patch 2    Transparent Dressings (TEGADERM FRAME STYLE) 2 3/8 X 2 3/4 \" bndg To use with clonidine patch weekly 20 Each 1    fluticasone propion-salmeterol (ADVAIR/WIXELA) 250-50 mcg/dose diskus inhaler Take 1 Puff by inhalation every twelve (12) hours. 1 Inhaler 3    acetaminophen (TYLENOL EXTRA STRENGTH) 500 mg tablet Take 1 Tab by mouth every six (6) hours as needed for Pain (not to exceed 4 tabs in 24 hrs). 60 Tab 0    diphenhydrAMINE (BENADRYL ALLERGY) 25 mg tablet Take 1 Tab by mouth every six (6) hours as needed (allergies). 30 Tab 3    loratadine (CLARITIN REDITABS) 10 mg dissolvable tablet Take 1 Tab by mouth daily. 30 Tab 2    SUMAtriptan (IMITREX) 50 mg tablet Take 1 tabs by mouth at onset of headache. Then may repeat 1 tab in 2 hrs if needed 30 Tab 0    ibuprofen (MOTRIN) 800 mg tablet Take 1 Tab by mouth every six (6) hours as needed for Pain. 60 Tab 0    albuterol (PROVENTIL HFA, VENTOLIN HFA, PROAIR HFA) 90 mcg/actuation inhaler Take 2 Puffs by inhalation every four (4) hours as needed for Wheezing or Shortness of Breath.  1 Inhaler 6    albuterol (PROVENTIL VENTOLIN) 2.5 mg /3 mL (0.083 %) nebulizer solution 3 mL by Nebulization route every four (4) hours as needed for Wheezing or Shortness of Breath. 2 Package 3    Nebulizer & Compressor machine 1 Each by Does Not Apply route every four (4) hours as needed. 1 Each 0       Allergies   Allergen Reactions    [place] Anaphylaxis    Cherry Anaphylaxis    Asa-Acetaminophen-Caff-Potass Hives     Just allergic to aspirin only per Pt.  Greer Other (comments)     syncope    Greer Concentrate [Flavoring Agent] Nausea and Vomiting    Zyrtec [Cetirizine] Other (comments)     Dizziness, blurred vision and disoriented            Family History   Problem Relation Age of Onset    Hypertension Mother     Diabetes Mother     Hypertension Sister     Diabetes Maternal Grandmother     Cancer Maternal Aunt         breast at age 54    Cancer Other         lung ca in great grand ma.  No Known Problems Father          REVIEW OF SYSTEMS  Constitutional symptoms: Negative  Eyes: Negative  Ears, Nose, Throat, and Mouth: Negative  Cardiovascular: Negative  Respiratory: Negative  Genitourinary: Negative  Integumentary (Skin and/or breast): Negative  Musculoskeletal: Positive for cervical pain  Extremities: Negative for edema. Endocrine/Rheumatologic: Negative  Hematologic/Lymphatic: Negative  Allergic/Immunologic: Negative  Psychiatric: Negative       PHYSICAL EXAMINATION  Visit Vitals  /84 (BP 1 Location: Right arm, BP Patient Position: Sitting)   Pulse 87   Temp 98 °F (36.7 °C) (Oral)   Resp 24   Ht 5' 1\" (1.549 m)   Wt 182 lb 9.6 oz (82.8 kg)   LMP 05/16/2014   SpO2 100%   BMI 34.50 kg/m²       CONSTITUTIONAL: NAD, A&O x 3  HEART: Regular rate and rhythm  ABDOMEN: Positive bowel sounds, soft, nontender, and nondistended  LUNGS: Clear to auscultation bilaterally. SKIN: Negative for rash. RANGE OF MOTION: The patient has full passive range of motion in all four extremities.   SENSATION: Decreased sensation to light touch on the RUE at the 1st digit. Otherwise,  sensation is intact to light touch throughout. MOTOR:   Straight Leg Raise: Negative, bilateral  Allen: Negative, bilateral  Tandem Gait: Neg. Deep tendon reflexes are 1 at the biceps, 0 at the triceps, and 0 at the brachioradialis bilaterally. Deep tendon reflexes are 0 at the knees and ankles bilaterally. Shoulder AB/Flex Elbow Flex Wrist Ext Elbow Ext Wrist Flex Hand Intrin Tone   Right +4/5 +4/5 +4/5 +4/5 +4/5 +4/5 +4/5   Left +4/5 +4/5 +4/5 +4/5 +4/5 +4/5 +4/5              Hip Flex Knee Ext Knee Flex Ankle DF GTE Ankle PF Tone   Right +4/5 +4/5 +4/5 +4/5 +4/5 +4/5 +4/5   Left +4/5 +4/5 +4/5 +4/5 +4/5 +4/5 +4/5     RADIOGRAPHS  Preliminary reading of C Spine plain films dated 3/12/2020 revealed:  Age-appropriate degenerative changes noted. No acute pathology identified. These are being sent out for official reading by Dr. Ruth Osman. ASSESSMENT   Diagnoses and all orders for this visit:    1. Neck pain  -     AMB POC XRAY, SPINE, CERVICAL; 2 OR 3    2. Cervical spondylosis without myelopathy           IMPRESSIONS/RECOMMENDATIONS:  Patient presents today with c/o neck pain. Multiple treatment options were discussed. Patient reports essentially asymptomatic at this time. Patient's films were benign. She did not require further treatment or work up at this time. Patient is neurologically intact. I will see the patient back prn. Written by Sandra Chan, as dictated by Quin Linares MD  I examined the patient, reviewed and agree with the note.

## 2020-03-20 ENCOUNTER — TELEPHONE (OUTPATIENT)
Dept: OBGYN CLINIC | Age: 55
End: 2020-03-20

## 2020-03-20 NOTE — TELEPHONE ENCOUNTER
Please give patient a call about [facility] but don't know [facility] name [facility] it

## 2020-03-27 ENCOUNTER — TELEPHONE (OUTPATIENT)
Dept: FAMILY MEDICINE CLINIC | Age: 55
End: 2020-03-27

## 2020-03-27 NOTE — TELEPHONE ENCOUNTER
Patient comments hemorrhoids have flared. Has tried preparation h and tucks pads with no relief given.  Would like to know if an ointment can be sent to the pharmacy

## 2020-03-27 NOTE — TELEPHONE ENCOUNTER
hemmorhoids is back and it is very painful she need that numbing cream you gave before please advise [phone]

## 2020-05-27 RX ORDER — PROPYLENE GLYCOL/PEG 400 0.3 %-0.4%
DROPS OPHTHALMIC (EYE)
Qty: 20 EACH | Refills: 1 | Status: SHIPPED | OUTPATIENT
Start: 2020-05-27 | End: 2020-10-15 | Stop reason: SDUPTHER

## 2020-05-27 RX ORDER — ACETAMINOPHEN 500 MG
500 TABLET ORAL
Qty: 60 TAB | Refills: 0 | Status: SHIPPED | OUTPATIENT
Start: 2020-05-27 | End: 2021-10-07 | Stop reason: SDUPTHER

## 2020-05-27 RX ORDER — MONTELUKAST SODIUM 5 MG/1
10 TABLET, CHEWABLE ORAL
Qty: 60 TAB | Refills: 2 | Status: SHIPPED | OUTPATIENT
Start: 2020-05-27 | End: 2020-10-08 | Stop reason: SDUPTHER

## 2020-05-27 RX ORDER — ROSUVASTATIN CALCIUM 5 MG/1
5 TABLET, COATED ORAL
Qty: 90 TAB | Refills: 1 | Status: SHIPPED | OUTPATIENT
Start: 2020-05-27 | End: 2020-09-11 | Stop reason: SDUPTHER

## 2020-05-27 NOTE — TELEPHONE ENCOUNTER
Last Visit: 1/10/20 with CRISTEL Lal  Next Appointment: 6/2/20 with CRISTEL Lal  Previous Refill Encounter(s): 1/10/20 Crestor #90 with 1 refill, 2/18/20 Singulair #60 with 2 refills, 7/8/19 Tylenol #60, 10/29/19 Tegaderm #20 with 1 refill    Requested Prescriptions     Pending Prescriptions Disp Refills    rosuvastatin (CRESTOR) 5 mg tablet 90 Tab 1     Sig: Take 1 Tab by mouth nightly.  montelukast (SINGULAIR) 5 mg chewable tablet 60 Tab 2     Sig: Take 2 Tabs by mouth nightly.  acetaminophen (Tylenol Extra Strength) 500 mg tablet 60 Tab 0     Sig: Take 1 Tab by mouth every six (6) hours as needed for Pain (not to exceed 4 tabs in 24 hrs).     Transparent Dressings (Tegaderm Frame Style) 2 3/8 X 2 3/4 \" bndg 20 Each 1     Sig: To use with clonidine patch weekly

## 2020-06-02 ENCOUNTER — CLINICAL SUPPORT (OUTPATIENT)
Dept: FAMILY MEDICINE CLINIC | Age: 55
End: 2020-06-02

## 2020-06-02 DIAGNOSIS — Z23 ENCOUNTER FOR IMMUNIZATION: Primary | ICD-10-CM

## 2020-06-02 NOTE — PROGRESS NOTES
Patient presented to the office today for 2nd shingrix vaccination. Patient is well today. No acute distress or concerns noted at this time. Patient received VIS statement. No questions or concerns verbalized at this time. Patient consents to receiving vaccination. Patient received  vaccination. Patient tolerated well. General comfort measures in regards to injection site given. Patient verbalized understanding. Patient left ambulatory with no complaints of pain or distress noted at this time.

## 2020-06-25 DIAGNOSIS — N94.10 DYSPAREUNIA IN FEMALE: ICD-10-CM

## 2020-09-11 ENCOUNTER — TELEPHONE (OUTPATIENT)
Dept: FAMILY MEDICINE CLINIC | Age: 55
End: 2020-09-11

## 2020-09-11 NOTE — TELEPHONE ENCOUNTER
Requested Prescriptions     Pending Prescriptions Disp Refills    cloNIDine (CATAPRES) 0.1 mg/24 hr ptwk 12 Patch 2     Si Patch by TransDERmal route every seven (7) days.  ondansetron (ZOFRAN ODT) 8 mg disintegrating tablet 20 Tab 0     Sig: Take 1 Tab by mouth every twelve (12) hours as needed for Nausea.  rosuvastatin (CRESTOR) 5 mg tablet 90 Tab 1     Sig: Take 1 Tab by mouth nightly.  montelukast is not working for her asking for a switch of meds.

## 2020-09-11 NOTE — TELEPHONE ENCOUNTER
Patient is asking for a replacement for Singulair due to its ineffectiveness. Please advise and pend new Rx if appropriate. Previous Rx's were [facility]    Last Visit: 1/10/20 with NP Jamison Yarbrough  Next Appointment: none  Previous Refill Encounter(s): 10/29/19 Catapres #12 with 2 refills, 20 Zofran #20, 20 Crestor #90 with 1 refill    Requested Prescriptions     Pending Prescriptions Disp Refills    cloNIDine (CATAPRES) 0.1 mg/24 hr ptwk 12 Patch 2     Si Patch by TransDERmal route every seven (7) days.  ondansetron (ZOFRAN ODT) 8 mg disintegrating tablet 20 Tab 0     Sig: Take 1 Tab by mouth every twelve (12) hours as needed for Nausea.  rosuvastatin (CRESTOR) 5 mg tablet 90 Tab 1     Sig: Take 1 Tab by mouth nightly.

## 2020-09-11 NOTE — TELEPHONE ENCOUNTER
Request for ref to foot doctor    would like for nurse to call back  Also has a rash from medications unsure which one is causing it.

## 2020-09-14 RX ORDER — CLONIDINE 0.1 MG/24H
1 PATCH, EXTENDED RELEASE TRANSDERMAL
Qty: 12 PATCH | Refills: 2 | Status: SHIPPED | OUTPATIENT
Start: 2020-09-14 | End: 2020-09-16 | Stop reason: SDUPTHER

## 2020-09-14 RX ORDER — ROSUVASTATIN CALCIUM 5 MG/1
5 TABLET, COATED ORAL
Qty: 90 TAB | Refills: 1 | Status: SHIPPED | OUTPATIENT
Start: 2020-09-14 | End: 2020-09-16 | Stop reason: SDUPTHER

## 2020-09-14 RX ORDER — ONDANSETRON 8 MG/1
8 TABLET, ORALLY DISINTEGRATING ORAL
Qty: 20 TAB | Refills: 0 | Status: SHIPPED | OUTPATIENT
Start: 2020-09-14 | End: 2020-09-16 | Stop reason: SDUPTHER

## 2020-09-14 NOTE — TELEPHONE ENCOUNTER
Please advise patient to schedule virtual appt to discuss medication changes. Other refills have been approved.

## 2020-09-16 NOTE — TELEPHONE ENCOUNTER
Singulair not working for patient and she is requesting a different medication. Also, please send alternate medication for Singulair to [facility] to be filled. Patient stated the attached medications needed to be [facility] [address] [facility]

## 2020-09-18 NOTE — TELEPHONE ENCOUNTER
[facility]. Patient would like them sent to [facility]. Patient would also like a med to replace Singulair. Please advise and pend new Rx if appropriate. Requested Prescriptions     Pending Prescriptions Disp Refills    ondansetron (ZOFRAN ODT) 8 mg disintegrating tablet 20 Tab 0     Sig: Take 1 Tab by mouth every twelve (12) hours as needed for Nausea.  cloNIDine (CATAPRES) 0.1 mg/24 hr ptwk 12 Patch 2     Si Patch by TransDERmal route every seven (7) days.  rosuvastatin (CRESTOR) 5 mg tablet 90 Tab 1     Sig: Take 1 Tab by mouth nightly.

## 2020-09-21 RX ORDER — CLONIDINE 0.1 MG/24H
1 PATCH, EXTENDED RELEASE TRANSDERMAL
Qty: 12 PATCH | Refills: 2 | Status: SHIPPED | OUTPATIENT
Start: 2020-09-21 | End: 2021-04-22 | Stop reason: SDUPTHER

## 2020-09-21 RX ORDER — ROSUVASTATIN CALCIUM 5 MG/1
5 TABLET, COATED ORAL
Qty: 90 TAB | Refills: 1 | Status: SHIPPED | OUTPATIENT
Start: 2020-09-21 | End: 2021-02-22 | Stop reason: SDUPTHER

## 2020-09-21 RX ORDER — ONDANSETRON 8 MG/1
8 TABLET, ORALLY DISINTEGRATING ORAL
Qty: 20 TAB | Refills: 0 | Status: SHIPPED | OUTPATIENT
Start: 2020-09-21 | End: 2021-04-22 | Stop reason: SDUPTHER

## 2020-09-24 ENCOUNTER — TELEPHONE (OUTPATIENT)
Dept: FAMILY MEDICINE CLINIC | Age: 55
End: 2020-09-24

## 2020-09-24 NOTE — TELEPHONE ENCOUNTER
Pt's concerns addressed in office. Med request filled and [facility], should have been [facility]. It was explained to pt, [facility] is the only pharmacy placed in chart. Pt was informed [facility] will be added to her Demographics. Patient verbalized understanding.

## 2020-09-24 NOTE — TELEPHONE ENCOUNTER
Patient states that [facility] not [facility]. She had [facility] that her meds be sent to [facility]. Patients refills was sent to [facility] and she was mailed the meds.  Patient states she is not paying for this medication because it was sent the wrong place

## 2020-10-07 ENCOUNTER — PATIENT MESSAGE (OUTPATIENT)
Dept: FAMILY MEDICINE CLINIC | Age: 55
End: 2020-10-07

## 2020-10-07 DIAGNOSIS — M79.671 RIGHT FOOT PAIN: Primary | ICD-10-CM

## 2020-10-08 RX ORDER — MONTELUKAST SODIUM 5 MG/1
10 TABLET, CHEWABLE ORAL
Qty: 180 TAB | Refills: 2 | Status: SHIPPED | OUTPATIENT
Start: 2020-10-08 | End: 2021-12-16 | Stop reason: ALTCHOICE

## 2020-10-14 NOTE — TELEPHONE ENCOUNTER
From: Jenny Hall  To: Felisha Quintero, CRISTEL  Sent: 10/7/2020 9:18 PM EDT  Subject: Non-Urgent Medical Question    I will not be paying for a visit on line because you cant look into my ENT or feel my lump on my foot or take my blood pressure no hands on exam is not going to do it for me I need someone to call me and tell me what kind of medicine youre giving me like I said my mucus is very thick and how can you see that on a screen you cant its upsetting my stomach so much the only way to get it out is by throwing up please call me on my cell phone all my medicine go to [facility] thank you.     Elías Butt

## 2020-10-21 RX ORDER — BUTALBITAL, ACETAMINOPHEN AND CAFFEINE 50; 325; 40 MG/1; MG/1; MG/1
1 TABLET ORAL
Qty: 40 TAB | Refills: 1 | Status: SHIPPED | OUTPATIENT
Start: 2020-10-21 | End: 2021-03-22 | Stop reason: SDUPTHER

## 2020-10-21 RX ORDER — ALBUTEROL SULFATE 90 UG/1
2 AEROSOL, METERED RESPIRATORY (INHALATION)
Qty: 1 INHALER | Refills: 6 | Status: SHIPPED | OUTPATIENT
Start: 2020-10-21 | End: 2021-04-22 | Stop reason: SDUPTHER

## 2020-10-21 RX ORDER — POLYETHYLENE GLYCOL 3350 17 G/17G
17 POWDER, FOR SOLUTION ORAL DAILY
Qty: 595 G | Refills: 0 | Status: SHIPPED | OUTPATIENT
Start: 2020-10-21 | End: 2021-02-22 | Stop reason: SDUPTHER

## 2020-10-21 RX ORDER — IBUPROFEN 800 MG/1
800 TABLET ORAL
Qty: 60 TAB | Refills: 0 | Status: SHIPPED | OUTPATIENT
Start: 2020-10-21 | End: 2021-10-07 | Stop reason: SDUPTHER

## 2020-10-21 RX ORDER — FLUTICASONE PROPIONATE AND SALMETEROL 250; 50 UG/1; UG/1
1 POWDER RESPIRATORY (INHALATION) EVERY 12 HOURS
Qty: 1 INHALER | Refills: 3 | Status: SHIPPED | OUTPATIENT
Start: 2020-10-21 | End: 2021-04-22 | Stop reason: SDUPTHER

## 2020-10-21 RX ORDER — ALBUTEROL SULFATE 0.83 MG/ML
2.5 SOLUTION RESPIRATORY (INHALATION)
Qty: 2 EACH | Refills: 3 | Status: SHIPPED | OUTPATIENT
Start: 2020-10-21 | End: 2021-10-07 | Stop reason: SDUPTHER

## 2020-10-21 RX ORDER — PROPYLENE GLYCOL/PEG 400 0.3 %-0.4%
DROPS OPHTHALMIC (EYE)
Qty: 20 EACH | Refills: 1 | Status: SHIPPED | OUTPATIENT
Start: 2020-10-21

## 2020-10-21 RX ORDER — DIPHENHYDRAMINE HCL 25 MG
25 TABLET ORAL
Qty: 30 TAB | Refills: 3 | Status: SHIPPED | OUTPATIENT
Start: 2020-10-21 | End: 2021-03-22 | Stop reason: SDUPTHER

## 2020-10-21 NOTE — TELEPHONE ENCOUNTER
Last visit 01/10/2020 NP Diane Jackman   Next appointment None   Previous refill encounter(s)   04/26/2019:  - Albuterol INH HFA #1 with 6 refills,  - Albuterol Neb Sol #2 with 3 refills,  - IBU #60,  - Benadryl #30 with 3 refills,  08/20/2019 Advair #1 with 3 refills,  10/29/2019 Fioricet #40 with 1 refill,  02/27/2020 Miralax #595 grams,   05/27/2020 Tegaderm #20 with 1 refill. Requested Prescriptions     Pending Prescriptions Disp Refills    albuterol (PROVENTIL HFA, VENTOLIN HFA, PROAIR HFA) 90 mcg/actuation inhaler 1 Inhaler 6     Sig: Take 2 Puffs by inhalation every four (4) hours as needed for Wheezing or Shortness of Breath.  albuterol (PROVENTIL VENTOLIN) 2.5 mg /3 mL (0.083 %) nebu 2 Each 3     Sig: 3 mL by Nebulization route every four (4) hours as needed for Wheezing or Shortness of Breath.  diphenhydrAMINE (Benadryl Allergy) 25 mg tablet 30 Tab 3     Sig: Take 1 Tab by mouth every six (6) hours as needed (allergies).  ibuprofen (MOTRIN) 800 mg tablet 60 Tab 0     Sig: Take 1 Tab by mouth every eight (8) hours as needed for Pain.  fluticasone propion-salmeteroL (ADVAIR/WIXELA) 250-50 mcg/dose diskus inhaler 1 Inhaler 3     Sig: Take 1 Puff by inhalation every twelve (12) hours.  butalbital-acetaminophen-caffeine (FIORICET, ESGIC) -40 mg per tablet 40 Tab 1     Sig: Take 1 Tab by mouth every six (6) hours as needed (for pain or headache). Not to exceed 4 tabs in 24 hrs    polyethylene glycol (MIRALAX) 17 gram/dose powder 595 g 0     Sig: Take 17 g by mouth daily.     Transparent Dressings (Tegaderm Frame Style) 2 3/8 X 2 3/4 \" bndg 20 Each 1     Sig: To use with clonidine patch weekly

## 2021-01-05 ENCOUNTER — HOSPITAL ENCOUNTER (OUTPATIENT)
Dept: LAB | Age: 56
Discharge: HOME | End: 2021-01-05
Payer: OTHER GOVERNMENT

## 2021-01-05 ENCOUNTER — OFFICE VISIT (OUTPATIENT)
Dept: FAMILY MEDICINE CLINIC | Age: 56
End: 2021-01-05
Payer: OTHER GOVERNMENT

## 2021-01-05 VITALS
HEIGHT: 61 IN | SYSTOLIC BLOOD PRESSURE: 128 MMHG | BODY MASS INDEX: 36.06 KG/M2 | DIASTOLIC BLOOD PRESSURE: 87 MMHG | TEMPERATURE: 99.5 F | WEIGHT: 191 LBS | HEART RATE: 81 BPM | RESPIRATION RATE: 20 BRPM

## 2021-01-05 DIAGNOSIS — Z11.59 NEED FOR HEPATITIS C SCREENING TEST: ICD-10-CM

## 2021-01-05 DIAGNOSIS — K59.00 CONSTIPATION, UNSPECIFIED CONSTIPATION TYPE: ICD-10-CM

## 2021-01-05 DIAGNOSIS — Z23 NEEDS FLU SHOT: ICD-10-CM

## 2021-01-05 DIAGNOSIS — I10 ESSENTIAL HYPERTENSION: ICD-10-CM

## 2021-01-05 DIAGNOSIS — E66.9 OBESITY, CLASS II, BMI 35-39.9: ICD-10-CM

## 2021-01-05 DIAGNOSIS — Z00.00 WELL WOMAN EXAM (NO GYNECOLOGICAL EXAM): Primary | ICD-10-CM

## 2021-01-05 DIAGNOSIS — E78.2 MIXED HYPERLIPIDEMIA: ICD-10-CM

## 2021-01-05 DIAGNOSIS — R19.8 ABDOMINAL FULLNESS: ICD-10-CM

## 2021-01-05 LAB
ALBUMIN SERPL-MCNC: 3.5 G/DL (ref 3.4–5)
ALBUMIN/GLOB SERPL: 1 {RATIO} (ref 0.8–1.7)
ALP SERPL-CCNC: 142 U/L (ref 45–117)
ALT SERPL-CCNC: 17 U/L (ref 13–56)
ANION GAP SERPL CALC-SCNC: 8 MMOL/L (ref 3–18)
AST SERPL-CCNC: 14 U/L (ref 10–38)
BILIRUB SERPL-MCNC: 0.5 MG/DL (ref 0.2–1)
BUN SERPL-MCNC: 7 MG/DL (ref 7–18)
BUN/CREAT SERPL: 10 (ref 12–20)
CALCIUM SERPL-MCNC: 9 MG/DL (ref 8.5–10.1)
CHLORIDE SERPL-SCNC: 106 MMOL/L (ref 100–111)
CHOLEST SERPL-MCNC: 184 MG/DL
CO2 SERPL-SCNC: 27 MMOL/L (ref 21–32)
CREAT SERPL-MCNC: 0.69 MG/DL (ref 0.6–1.3)
EST. AVERAGE GLUCOSE BLD GHB EST-MCNC: 117 MG/DL
GLOBULIN SER CALC-MCNC: 3.6 G/DL (ref 2–4)
GLUCOSE SERPL-MCNC: 80 MG/DL (ref 74–99)
HBA1C MFR BLD: 5.7 % (ref 4.2–5.6)
HDLC SERPL-MCNC: 60 MG/DL (ref 40–60)
HDLC SERPL: 3.1 {RATIO} (ref 0–5)
LDLC SERPL CALC-MCNC: 106.2 MG/DL (ref 0–100)
LIPID PROFILE,FLP: ABNORMAL
POTASSIUM SERPL-SCNC: 4.3 MMOL/L (ref 3.5–5.5)
PROT SERPL-MCNC: 7.1 G/DL (ref 6.4–8.2)
SODIUM SERPL-SCNC: 141 MMOL/L (ref 136–145)
TRIGL SERPL-MCNC: 89 MG/DL (ref ?–150)
VLDLC SERPL CALC-MCNC: 17.8 MG/DL

## 2021-01-05 PROCEDURE — 36415 COLL VENOUS BLD VENIPUNCTURE: CPT

## 2021-01-05 PROCEDURE — 80061 LIPID PANEL: CPT

## 2021-01-05 PROCEDURE — 80053 COMPREHEN METABOLIC PANEL: CPT

## 2021-01-05 PROCEDURE — 86803 HEPATITIS C AB TEST: CPT

## 2021-01-05 PROCEDURE — 90686 IIV4 VACC NO PRSV 0.5 ML IM: CPT | Performed by: NURSE PRACTITIONER

## 2021-01-05 PROCEDURE — 99396 PREV VISIT EST AGE 40-64: CPT | Performed by: NURSE PRACTITIONER

## 2021-01-05 PROCEDURE — 90471 IMMUNIZATION ADMIN: CPT | Performed by: NURSE PRACTITIONER

## 2021-01-05 PROCEDURE — 83036 HEMOGLOBIN GLYCOSYLATED A1C: CPT

## 2021-01-05 NOTE — PROGRESS NOTES
Subjective:   60 y.o. female for Well Woman Check. Her gyne and breast care is done elsewhere by her Ob-Gyne physician. Patient Active Problem List   Diagnosis Code    HTN (hypertension) I10    Hyperlipemia E78.5    Asthma J45.909    Allergic rhinitis J30.9    Obesity E66.9    Chronic shoulder pain Q67.246, P51.57    Lichen planus Y19.2    Anxiety F41.9    Allergic conjunctivitis H10.10    Severe obesity (HCC) E66.01     Patient Active Problem List    Diagnosis Date Noted    Severe obesity ([address] 03/10/2020    Allergic conjunctivitis 31/75/2456    Lichen planus 87/88/6571    Anxiety 09/07/2010    HTN (hypertension) 07/16/2010    Hyperlipemia 07/16/2010    Asthma 07/16/2010    Allergic rhinitis 07/16/2010    Obesity 07/16/2010    Chronic shoulder pain 07/16/2010     Current Outpatient Medications   Medication Sig Dispense Refill    acetaminophen (Tylenol Extra Strength) 500 mg tablet Take 1 Tab by mouth every six (6) hours as needed for Pain (not to exceed 4 tabs in 24 hrs). 60 Tab 0    albuterol (PROVENTIL HFA, VENTOLIN HFA, PROAIR HFA) 90 mcg/actuation inhaler Take 2 Puffs by inhalation every four (4) hours as needed for Wheezing or Shortness of Breath. 1 Inhaler 6    albuterol (PROVENTIL VENTOLIN) 2.5 mg /3 mL (0.083 %) nebu 3 mL by Nebulization route every four (4) hours as needed for Wheezing or Shortness of Breath. 2 Each 3    diphenhydrAMINE (Benadryl Allergy) 25 mg tablet Take 1 Tab by mouth every six (6) hours as needed (allergies). 30 Tab 3    ibuprofen (MOTRIN) 800 mg tablet Take 1 Tab by mouth every eight (8) hours as needed for Pain. 60 Tab 0    fluticasone propion-salmeteroL (ADVAIR/WIXELA) 250-50 mcg/dose diskus inhaler Take 1 Puff by inhalation every twelve (12) hours. 1 Inhaler 3    butalbital-acetaminophen-caffeine (FIORICET, ESGIC) -40 mg per tablet Take 1 Tab by mouth every six (6) hours as needed (for pain or headache).  Not to exceed 4 tabs in 24 hrs 40 Tab 1    polyethylene glycol (MIRALAX) 17 gram/dose powder Take 17 g by mouth daily. 595 g 0    Transparent Dressings (Tegaderm Frame Style) 2 3/8 X 2 3/4 \" bndg To use with clonidine patch weekly 20 Each 1    montelukast (SINGULAIR) 5 mg chewable tablet Take 2 Tabs by mouth nightly. 180 Tab 2    ondansetron (ZOFRAN ODT) 8 mg disintegrating tablet Take 1 Tab by mouth every twelve (12) hours as needed for Nausea. 20 Tab 0    cloNIDine (CATAPRES) 0.1 mg/24 hr ptwk 1 Patch by TransDERmal route every seven (7) days. 12 Patch 2    rosuvastatin (CRESTOR) 5 mg tablet Take 1 Tab by mouth nightly. 90 Tab 1    ospemifene (OSPHENA) 60 mg tab tablet Take 1 Tab by mouth daily. 90 Tab 3    hydrocortisone-pramoxine (PROCTOFOAM HC) rectal foam Insert 1 Applicator into rectum two (2) times a day. 1 Can 0    amitriptyline (ELAVIL) 10 mg tablet Take 10 mg by mouth nightly.  erenumab-aooe (AIMOVIG) 140 mg/mL injection 140 mg by SubCUTAneous route every month.  loratadine (CLARITIN REDITABS) 10 mg dissolvable tablet Take 1 Tab by mouth daily. 30 Tab 2    SUMAtriptan (IMITREX) 50 mg tablet Take 1 tabs by mouth at onset of headache. Then may repeat 1 tab in 2 hrs if needed 30 Tab 0    Nebulizer & Compressor machine 1 Each by Does Not Apply route every four (4) hours as needed. 1 Each 0     Allergies   Allergen Reactions    [place] Anaphylaxis    Cherry Anaphylaxis    Asa-Acetaminophen-Caff-Potass Hives     Just allergic to aspirin only per Pt.      [place] Other (comments)     syncope    [place] Concentrate [Flavoring Agent] Nausea and Vomiting    Zyrtec [Cetirizine] Other (comments)     Dizziness, blurred vision and disoriented     Past Medical History:   Diagnosis Date    Allergic rhinitis 7/16/2010    Asthma     Chronic shoulder pain 7/16/2010    HTN (hypertension) 7/16/2010    Hyperlipemia 9/54/3589    Lichen planus 8/0/9032    Obesity 7/16/2010     Past Surgical History:   Procedure Laterality Date    HX HYSTERECTOMY  5/16/14    HX HYSTERECTOMY       Family History   Problem Relation Age of Onset    Hypertension Mother     Diabetes Mother     Hypertension Sister     Diabetes Maternal Grandmother     Cancer Maternal Aunt         breast at age 54    Cancer Other         lung ca in great grand ma.  No Known Problems Father      Social History     Tobacco Use    Smoking status: Never Smoker    Smokeless tobacco: Never Used   Substance Use Topics    Alcohol use: No      Lab Results   Component Value Date/Time    Cholesterol, total 298 (H) 01/10/2020 01:02 PM    HDL Cholesterol 66 (H) 01/10/2020 01:02 PM    LDL, calculated 211.6 (H) 01/10/2020 01:02 PM    Triglyceride 102 01/10/2020 01:02 PM    CHOL/HDL Ratio 4.5 01/10/2020 01:02 PM     Lab Results   Component Value Date/Time    Sodium 140 01/10/2020 01:02 PM    Potassium 4.4 01/10/2020 01:02 PM    Chloride 111 01/10/2020 01:02 PM    CO2 27 01/10/2020 01:02 PM    Anion gap 2 (L) 01/10/2020 01:02 PM    Glucose 74 01/10/2020 01:02 PM    BUN 8 01/10/2020 01:02 PM    Creatinine 0.74 01/10/2020 01:02 PM    BUN/Creatinine ratio 11 (L) 01/10/2020 01:02 PM    GFR est AA >60 01/10/2020 01:02 PM    GFR est non-AA >60 01/10/2020 01:02 PM    Calcium 8.9 01/10/2020 01:02 PM    Bilirubin, total 0.3 01/10/2020 01:02 PM    ALT (SGPT) 12 (L) 01/10/2020 01:02 PM    Alk. phosphatase 157 (H) 01/10/2020 01:02 PM    Protein, total 7.0 01/10/2020 01:02 PM    Albumin 3.6 01/10/2020 01:02 PM    Globulin 3.4 01/10/2020 01:02 PM    A-G Ratio 1.1 01/10/2020 01:02 PM         ROS: Feeling generally well. No TIA's or unusual headaches, no dysphagia. No prolonged cough. No dyspnea or chest pain on exertion. No abdominal pain, change in bowel habits, black or bloody stools. No urinary tract symptoms. No new or unusual musculoskeletal symptoms. Specific concerns today: states she has lump on her right anterior foot for a while, increased in size 3-4 months ago.   States she has pain with wearing certain shoes. Objective: The patient appears well, alert, oriented x 3, in no distress. Visit Vitals  /87 (BP 1 Location: Left arm, BP Patient Position: Sitting)   Pulse 81   Temp 99.5 °F (37.5 °C) (Temporal)   Resp 20   Ht 5' 1\" (1.549 m)   Wt 191 lb (86.6 kg)   LMP 05/16/2014   BMI 36.09 kg/m²     ENT normal.  Neck supple. No adenopathy or thyromegaly. MONA. Lungs are clear, good air entry, no wheezes, rhonchi or rales. S1 and S2 normal, no murmurs, regular rate and rhythm. Abdomen soft without tenderness, guarding, mass or organomegaly. Extremities show no edema, normal peripheral pulses. Neurological is normal, no focal findings. Musculoskeletal - right foot: small slightly fluctuant nodule to anterior foot. Breast and Pelvic exams are deferred. Assessment/Plan:   Well Woman  routine labs ordered, call if any problems    ICD-10-CM ICD-9-CM    1. Well woman exam (no gynecological exam)  Z00.00 V70.0     [V70.0]   2. Needs flu shot  Z23 V04.81 INFLUENZA VIRUS VAC QUAD,SPLIT,PRESV FREE SYRINGE IM   3. Mixed hyperlipidemia  E78.2 272.2 LIPID PANEL      METABOLIC PANEL, COMPREHENSIVE   4. Constipation, unspecified constipation type  K59.00 564.00    5. Essential hypertension  I10 401.9 LIPID PANEL      METABOLIC PANEL, COMPREHENSIVE   6. Obesity, Class II, BMI 35-39.9  E66.9 278.00 HEMOGLOBIN A1C WITH EAG   7. Abdominal fullness  R19.8 789.9 XR ABD (AP AND ERECT OR DECUB)   8. Need for hepatitis C screening test  Z11.59 V73.89 HEPATITIS C AB     Orders Placed This Encounter    XR ABD (AP AND ERECT OR DECUB)    Influenza Virus Vaccine QUAD, PF Syr 6 Months + (Flulaval, Fluzone, Fluarix [id number])    HEMOGLOBIN A1C WITH EAG    LIPID PANEL    METABOLIC PANEL, COMPREHENSIVE    HEPATITIS C AB    EPINEPHrine (EpiPen) 0.3 mg/0.3 mL injection       I have discussed the diagnosis with the patient and the intended plan as seen in the above orders.   The patient has received an after-visit summary and questions were answered concerning future plans. I have discussed medication side effects and warnings with the patient as well. Patient agreeable with above plan and verbalizes understanding. Follow-up and Dispositions    · Return in about 4 months (around 5/5/2021) for HTN/HLD, prediabetes.

## 2021-01-06 LAB
HCV AB SER IA-ACNC: 0.03 INDEX
HCV AB SERPL QL IA: NEGATIVE
HCV COMMENT,HCGAC: NORMAL

## 2021-01-06 RX ORDER — EPINEPHRINE 0.3 MG/.3ML
0.3 INJECTION SUBCUTANEOUS
Qty: 1 SYRINGE | Refills: 0 | Status: SHIPPED | OUTPATIENT
Start: 2021-01-06 | End: 2021-01-06

## 2021-01-12 ENCOUNTER — TELEPHONE (OUTPATIENT)
Dept: FAMILY MEDICINE CLINIC | Age: 56
End: 2021-01-12

## 2021-02-05 ENCOUNTER — TRANSCRIBE ORDER (OUTPATIENT)
Dept: SCHEDULING | Age: 56
End: 2021-02-05

## 2021-02-05 DIAGNOSIS — Z12.31 VISIT FOR SCREENING MAMMOGRAM: Primary | ICD-10-CM

## 2021-02-11 ENCOUNTER — HOSPITAL ENCOUNTER (OUTPATIENT)
Dept: GENERAL RADIOLOGY | Age: 56
Discharge: HOME | End: 2021-02-11
Payer: OTHER GOVERNMENT

## 2021-02-11 DIAGNOSIS — R19.8 ABDOMINAL FULLNESS: ICD-10-CM

## 2021-02-11 PROCEDURE — 74019 RADEX ABDOMEN 2 VIEWS: CPT

## 2021-02-19 ENCOUNTER — TRANSCRIBE ORDER (OUTPATIENT)
Dept: SCHEDULING | Age: 56
End: 2021-02-19

## 2021-02-19 DIAGNOSIS — M67.40 GANGLION CYST: Primary | ICD-10-CM

## 2021-02-22 DIAGNOSIS — G43.009 NONINTRACTABLE MIGRAINE, UNSPECIFIED MIGRAINE TYPE: ICD-10-CM

## 2021-02-23 NOTE — TELEPHONE ENCOUNTER
Last Visit: 1/5/21 with NP Zoey Duran  Next Appointment: Advised to follow-up in 4 months  Previous Refill Encounter(s): 4/26/19 Imitrex #30, 9/21/20 Crestor #90 with 1 refill, 10/21/20 Miralax #[id number]    Requested Prescriptions     Pending Prescriptions Disp Refills    SUMAtriptan (IMITREX) 50 mg tablet 30 Tab 1     Sig: Take 1 tabs by mouth at onset of headache. Then may repeat 1 tab in 2 hrs if needed    rosuvastatin (CRESTOR) 5 mg tablet 90 Tab 1     Sig: Take 1 Tab by mouth nightly.  polyethylene glycol (MIRALAX) 17 gram/dose powder 1521 g 1     Sig: Take 17 g by mouth daily.

## 2021-02-24 ENCOUNTER — HOSPITAL ENCOUNTER (OUTPATIENT)
Age: 56
Discharge: HOME | End: 2021-02-24
Attending: PODIATRIST
Payer: OTHER GOVERNMENT

## 2021-02-24 DIAGNOSIS — M67.40 GANGLION CYST: ICD-10-CM

## 2021-02-24 PROCEDURE — 74011636320 HC RX REV CODE- 636/320

## 2021-02-24 PROCEDURE — 73720 MRI LWR EXTREMITY W/O&W/DYE: CPT

## 2021-02-24 PROCEDURE — A9575 INJ GADOTERATE MEGLUMI 0.1ML: HCPCS

## 2021-02-24 RX ADMIN — GADOTERATE MEGLUMINE 16 ML: 376.9 INJECTION INTRAVENOUS at 11:00

## 2021-02-27 RX ORDER — POLYETHYLENE GLYCOL 3350 17 G/17G
17 POWDER, FOR SOLUTION ORAL DAILY
Qty: 1521 G | Refills: 1 | Status: SHIPPED | OUTPATIENT
Start: 2021-02-27 | End: 2021-10-07 | Stop reason: SDUPTHER

## 2021-02-27 RX ORDER — SUMATRIPTAN 50 MG/1
TABLET, FILM COATED ORAL
Qty: 30 TAB | Refills: 1 | Status: SHIPPED
Start: 2021-02-27 | End: 2021-04-22

## 2021-02-27 RX ORDER — ROSUVASTATIN CALCIUM 5 MG/1
5 TABLET, COATED ORAL
Qty: 90 TAB | Refills: 1 | Status: SHIPPED | OUTPATIENT
Start: 2021-02-27 | End: 2021-04-22 | Stop reason: SDUPTHER

## 2021-03-08 NOTE — PROGRESS NOTES
HPI:   Medical clearance for podiatric surgery (excision of right ft ganglion)  +HTN/asthma  Currently w/o chest pain/abd. discomfort; no increased dyspnea, cough or pedal edema; denies constitutional complaints of fever, night sweats or wt loss; no evidence of GI/ hemorrhage    ROS is otherwise negative.     Past Medical History:   Diagnosis Date    Allergic rhinitis 7/16/2010    Asthma     Chronic shoulder pain 7/16/2010    HTN (hypertension) 7/16/2010    Hyperlipemia 8/63/8342    Lichen planus 4/1/0576    Obesity 7/16/2010       Past Surgical History:   Procedure Laterality Date    HX HYSTERECTOMY  5/16/14    HX HYSTERECTOMY         Social History     Socioeconomic History    Marital status:      Spouse name: Not on file    Number of children: Not on file    Years of education: Not on file    Highest education level: Not on file   Occupational History    Not on file   Social Needs    Financial resource strain: Not on file    Food insecurity     Worry: Not on file     Inability: Not on file   [employer] needs     Medical: Not on file     Non-medical: Not on file   Tobacco Use    Smoking status: Never Smoker    Smokeless tobacco: Never Used   Substance and Sexual Activity    Alcohol use: No    Drug use: No    Sexual activity: Yes     Partners: Male     Birth control/protection: Surgical     Comment: btl   Lifestyle    Physical activity     Days per week: Not on file     Minutes per session: Not on file    Stress: Not on file   Relationships    Social connections     Talks on phone: Not on file     Gets together: Not on file     Attends [employer] service: Not on file     Active member of club or organization: Not on file     Attends meetings of clubs or organizations: Not on file     Relationship status: Not on file    Intimate partner violence     Fear of current or ex partner: Not on file     Emotionally abused: Not on file     Physically abused: Not on file     Forced sexual activity: Not on file   Other Topics Concern    Not on file   Social History Narrative    Not on file       Allergies   Allergen Reactions    [place] Anaphylaxis    Cherry Anaphylaxis    Asa-Acetaminophen-Caff-Potass Hives     Just allergic to aspirin only per Pt.  [place] Other (comments)     syncope    [place] Concentrate [Flavoring Agent] Nausea and Vomiting    Zyrtec [Cetirizine] Other (comments)     Dizziness, blurred vision and disoriented       Family History   Problem Relation Age of Onset    Hypertension Mother     Diabetes Mother     Hypertension Sister     Diabetes Maternal Grandmother     Cancer Maternal Aunt         breast at age 54    Cancer Other         lung ca in great grand ma.  No Known Problems Father        Current Outpatient Medications   Medication Sig Dispense Refill    amitriptyline (ELAVIL) 10 mg tablet Take 1 Tab by mouth nightly. 30 Tab 1    SUMAtriptan (IMITREX) 50 mg tablet Take 1 tabs by mouth at onset of headache. Then may repeat 1 tab in 2 hrs if needed 30 Tab 1    rosuvastatin (CRESTOR) 5 mg tablet Take 1 Tab by mouth nightly. 90 Tab 1    polyethylene glycol (MIRALAX) 17 gram/dose powder Take 17 g by mouth daily. 1521 g 1    albuterol (PROVENTIL HFA, VENTOLIN HFA, PROAIR HFA) 90 mcg/actuation inhaler Take 2 Puffs by inhalation every four (4) hours as needed for Wheezing or Shortness of Breath. 1 Inhaler 6    albuterol (PROVENTIL VENTOLIN) 2.5 mg /3 mL (0.083 %) nebu 3 mL by Nebulization route every four (4) hours as needed for Wheezing or Shortness of Breath. 2 Each 3    diphenhydrAMINE (Benadryl Allergy) 25 mg tablet Take 1 Tab by mouth every six (6) hours as needed (allergies). 30 Tab 3    ibuprofen (MOTRIN) 800 mg tablet Take 1 Tab by mouth every eight (8) hours as needed for Pain. 60 Tab 0    fluticasone propion-salmeteroL (ADVAIR/WIXELA) 250-50 mcg/dose diskus inhaler Take 1 Puff by inhalation every twelve (12) hours.  1 Inhaler 3    butalbital-acetaminophen-caffeine (FIORICET, ESGIC) -40 mg per tablet Take 1 Tab by mouth every six (6) hours as needed (for pain or headache). Not to exceed 4 tabs in 24 hrs 40 Tab 1    Transparent Dressings (Tegaderm Frame Style) 2 3/8 X 2 3/4 \" bndg To use with clonidine patch weekly 20 Each 1    montelukast (SINGULAIR) 5 mg chewable tablet Take 2 Tabs by mouth nightly. 180 Tab 2    ondansetron (ZOFRAN ODT) 8 mg disintegrating tablet Take 1 Tab by mouth every twelve (12) hours as needed for Nausea. 20 Tab 0    cloNIDine (CATAPRES) 0.1 mg/24 hr ptwk 1 Patch by TransDERmal route every seven (7) days. 12 Patch 2    ospemifene (OSPHENA) 60 mg tab tablet Take 1 Tab by mouth daily. 90 Tab 3    acetaminophen (Tylenol Extra Strength) 500 mg tablet Take 1 Tab by mouth every six (6) hours as needed for Pain (not to exceed 4 tabs in 24 hrs). 60 Tab 0    hydrocortisone-pramoxine (PROCTOFOAM HC) rectal foam Insert 1 Applicator into rectum two (2) times a day. 1 Can 0    erenumab-aooe (AIMOVIG) 140 mg/mL injection 140 mg by SubCUTAneous route every month.  loratadine (CLARITIN REDITABS) 10 mg dissolvable tablet Take 1 Tab by mouth daily. 30 Tab 2    Nebulizer & Compressor machine 1 Each by Does Not Apply route every four (4) hours as needed. 1 Each 0           Visit Vitals  /80   Pulse 82   Temp 97.2 °F (36.2 °C) (Temporal)   Resp 18   Ht 5' 1\" (1.549 m)   Wt 191 lb (86.6 kg)   LMP 05/16/2014   SpO2 97%   BMI 36.09 kg/m²       PE  Heavy in NAD  HEENT:  OP: clear. Neck: supple w/o mass or bruits. Chest: clear. CV: RRR w/o m,r,g; pulses intact. Abd: soft, NT, w/o HSM or mass. Ext: w/o edema. Neuro: NF. Assessment and Plan    Encounter Diagnoses   Name Primary?     Preop examination Yes     No obvious contraindications to planned surgery  Medically cleared for planned foot procedure  HTN - controlled  Asthma - w/o recent exacerbations  OV 3 mos or prn  I have explained plan to patient and the patient verbalizes understanding

## 2021-03-22 DIAGNOSIS — G43.009 NONINTRACTABLE MIGRAINE, UNSPECIFIED MIGRAINE TYPE: ICD-10-CM

## 2021-03-22 RX ORDER — SUMATRIPTAN 50 MG/1
TABLET, FILM COATED ORAL
Qty: 30 TAB | Refills: 1 | Status: CANCELLED | OUTPATIENT
Start: 2021-03-22

## 2021-03-22 RX ORDER — AMITRIPTYLINE HYDROCHLORIDE 10 MG/1
10 TABLET, FILM COATED ORAL
Qty: 30 TAB | Refills: 1 | Status: CANCELLED | OUTPATIENT
Start: 2021-03-22

## 2021-03-22 RX ORDER — ROSUVASTATIN CALCIUM 5 MG/1
5 TABLET, COATED ORAL
Qty: 90 TAB | Refills: 1 | Status: CANCELLED | OUTPATIENT
Start: 2021-03-22

## 2021-03-23 ENCOUNTER — OFFICE VISIT (OUTPATIENT)
Dept: FAMILY MEDICINE CLINIC | Age: 56
End: 2021-03-23
Payer: OTHER GOVERNMENT

## 2021-03-23 ENCOUNTER — HOSPITAL ENCOUNTER (OUTPATIENT)
Dept: LAB | Age: 56
Discharge: HOME | End: 2021-03-23
Payer: OTHER GOVERNMENT

## 2021-03-23 VITALS
WEIGHT: 191 LBS | BODY MASS INDEX: 36.06 KG/M2 | OXYGEN SATURATION: 97 % | SYSTOLIC BLOOD PRESSURE: 135 MMHG | TEMPERATURE: 97.2 F | HEART RATE: 82 BPM | DIASTOLIC BLOOD PRESSURE: 80 MMHG | HEIGHT: 61 IN | RESPIRATION RATE: 18 BRPM

## 2021-03-23 DIAGNOSIS — Z01.818 PREOP EXAMINATION: Primary | ICD-10-CM

## 2021-03-23 DIAGNOSIS — Z01.818 PREOP EXAMINATION: ICD-10-CM

## 2021-03-23 LAB
ALBUMIN SERPL-MCNC: 3.9 G/DL (ref 3.4–5)
ALBUMIN/GLOB SERPL: 1.2 {RATIO} (ref 0.8–1.7)
ALP SERPL-CCNC: 146 U/L (ref 45–117)
ALT SERPL-CCNC: 16 U/L (ref 13–56)
ANION GAP SERPL CALC-SCNC: 9 MMOL/L (ref 3–18)
AST SERPL-CCNC: 12 U/L (ref 10–38)
BASOPHILS # BLD: 0 K/UL (ref 0–0.1)
BASOPHILS NFR BLD: 0 % (ref 0–2)
BILIRUB SERPL-MCNC: 0.4 MG/DL (ref 0.2–1)
BUN SERPL-MCNC: 10 MG/DL (ref 7–18)
BUN/CREAT SERPL: 15 (ref 12–20)
CALCIUM SERPL-MCNC: 9.1 MG/DL (ref 8.5–10.1)
CHLORIDE SERPL-SCNC: 107 MMOL/L (ref 100–111)
CO2 SERPL-SCNC: 26 MMOL/L (ref 21–32)
CREAT SERPL-MCNC: 0.67 MG/DL (ref 0.6–1.3)
DIFFERENTIAL METHOD BLD: ABNORMAL
EOSINOPHIL # BLD: 0.2 K/UL (ref 0–0.4)
EOSINOPHIL NFR BLD: 3 % (ref 0–5)
ERYTHROCYTE [DISTWIDTH] IN BLOOD BY AUTOMATED COUNT: 14.8 % (ref 11.6–14.5)
GLOBULIN SER CALC-MCNC: 3.3 G/DL (ref 2–4)
GLUCOSE SERPL-MCNC: 90 MG/DL (ref 74–99)
HCT VFR BLD AUTO: 36.6 % (ref 35–45)
HGB BLD-MCNC: 11.3 G/DL (ref 12–16)
LYMPHOCYTES # BLD: 2.7 K/UL (ref 0.9–3.6)
LYMPHOCYTES NFR BLD: 43 % (ref 21–52)
MCH RBC QN AUTO: 24.5 PG (ref 24–34)
MCHC RBC AUTO-ENTMCNC: 30.9 G/DL (ref 31–37)
MCV RBC AUTO: 79.2 FL (ref 74–97)
MONOCYTES # BLD: 0.6 K/UL (ref 0.05–1.2)
MONOCYTES NFR BLD: 9 % (ref 3–10)
NEUTS SEG # BLD: 2.7 K/UL (ref 1.8–8)
NEUTS SEG NFR BLD: 45 % (ref 40–73)
PLATELET # BLD AUTO: 302 K/UL (ref 135–420)
PMV BLD AUTO: 9.6 FL (ref 9.2–11.8)
POTASSIUM SERPL-SCNC: 4.4 MMOL/L (ref 3.5–5.5)
PROT SERPL-MCNC: 7.2 G/DL (ref 6.4–8.2)
RBC # BLD AUTO: 4.62 M/UL (ref 4.2–5.3)
SODIUM SERPL-SCNC: 142 MMOL/L (ref 136–145)
WBC # BLD AUTO: 6.3 K/UL (ref 4.6–13.2)

## 2021-03-23 PROCEDURE — 85025 COMPLETE CBC W/AUTO DIFF WBC: CPT

## 2021-03-23 PROCEDURE — 36415 COLL VENOUS BLD VENIPUNCTURE: CPT

## 2021-03-23 PROCEDURE — 80053 COMPREHEN METABOLIC PANEL: CPT

## 2021-03-23 PROCEDURE — 99213 OFFICE O/P EST LOW 20 MIN: CPT | Performed by: INTERNAL MEDICINE

## 2021-03-23 NOTE — TELEPHONE ENCOUNTER
Last Visit: 1/5/21 with NP Dylan Paul  Next Appointment: Advised to follow-up in 4 months  Previous Refill Encounter(s): 10/21/20 Fioricet #40 with 1 refill & Benadryl #30 with 3 refills, 3/30/20 Proctofoam #1    Requested Prescriptions     Pending Prescriptions Disp Refills    hydrocortisone-pramoxine (PROCTOFOAM HC) rectal foam 1 Can 0     Sig: Insert 1 Applicator into rectum two (2) times a day.  diphenhydrAMINE (Benadryl Allergy) 25 mg tablet 30 Tab 3     Sig: Take 1 Tab by mouth every six (6) hours as needed (allergies).  butalbital-acetaminophen-caffeine (FIORICET, ESGIC) -40 mg per tablet 40 Tab 1     Sig: Take 1 Tab by mouth every six (6) hours as needed (for pain or headache). Not to exceed 4 tabs in 24 hrs       New Rx for Elavil sent on 3/16/21, Crestor on 2/27/21 & Imitrex on 2/27/21. Patient should contact the pharmacy for a refill.

## 2021-03-23 NOTE — PROGRESS NOTES
Sharmila Solorio presents today for   Chief Complaint   Patient presents with    Pre-op Exam       Is someone accompanying this pt? no    Is the patient using any DME equipment during OV? no    Depression Screening:  3 most recent PHQ Screens 3/23/2021   Little interest or pleasure in doing things Not at all   Feeling down, depressed, irritable, or hopeless Not at all   Total Score PHQ 2 0   Trouble falling or staying asleep, or sleeping too much -   Feeling tired or having little energy -   Poor appetite, weight loss, or overeating -   Feeling bad about yourself - or that you are a failure or have let yourself or your family down -   Trouble concentrating on things such as school, work, reading, or watching TV -   Moving or speaking so slowly that other people could have noticed; or the opposite being so fidgety that others notice -   Thoughts of being better off dead, or hurting yourself in some way -   PHQ 9 Score -   How difficult have these problems made it for you to do your work, take care of your home and get along with others -       Learning Assessment:  Learning Assessment 5/22/2015   PRIMARY LEARNER Patient   HIGHEST LEVEL OF EDUCATION - PRIMARY LEARNER  -   BARRIERS PRIMARY LEARNER -   CO-LEARNER CAREGIVER -   PRIMARY LANGUAGE ENGLISH   LEARNER PREFERENCE PRIMARY LISTENING     READING     DEMONSTRATION     -   ANSWERED BY patient   RELATIONSHIP SELF       Health Maintenance reviewed and discussed and ordered per Provider. Health Maintenance Due   Topic Date Due    Pneumococcal 0-64 years (1 of 1 - PPSV23) Never done    COVID-19 Vaccine (1) Never done    PAP AKA CERVICAL CYTOLOGY  08/12/2013    DTaP/Tdap/Td series (2 - Td) 10/07/2020    Breast Cancer Screen Mammogram  04/02/2021   . Coordination of Care:  1. Have you been to the ER, urgent care clinic since your last visit?no Hospitalized since your last visit? no    2.  Have you seen or consulted any other health care providers outside of the [address] [place] [address] since your last visit? Include any pap smears or colon screening.  no

## 2021-03-24 RX ORDER — DIPHENHYDRAMINE HCL 25 MG
25 TABLET ORAL
Qty: 30 TAB | Refills: 3 | Status: SHIPPED | OUTPATIENT
Start: 2021-03-24 | End: 2021-10-07 | Stop reason: SDUPTHER

## 2021-03-24 RX ORDER — BUTALBITAL, ACETAMINOPHEN AND CAFFEINE 50; 325; 40 MG/1; MG/1; MG/1
1 TABLET ORAL
Qty: 40 TAB | Refills: 1 | Status: SHIPPED | OUTPATIENT
Start: 2021-03-24 | End: 2021-10-07 | Stop reason: SDUPTHER

## 2021-03-29 ENCOUNTER — HOSPITAL ENCOUNTER (OUTPATIENT)
Dept: LAB | Age: 56
Discharge: HOME | End: 2021-03-29
Payer: OTHER GOVERNMENT

## 2021-03-29 PROCEDURE — 88304 TISSUE EXAM BY PATHOLOGIST: CPT

## 2021-04-13 ENCOUNTER — HOSPITAL ENCOUNTER (OUTPATIENT)
Dept: MAMMOGRAPHY | Age: 56
Discharge: HOME | End: 2021-04-13
Attending: NURSE PRACTITIONER
Payer: OTHER GOVERNMENT

## 2021-04-13 DIAGNOSIS — Z12.31 VISIT FOR SCREENING MAMMOGRAM: ICD-10-CM

## 2021-04-13 PROCEDURE — 77067 SCR MAMMO BI INCL CAD: CPT

## 2021-04-22 ENCOUNTER — OFFICE VISIT (OUTPATIENT)
Dept: FAMILY MEDICINE CLINIC | Age: 56
End: 2021-04-22
Payer: OTHER GOVERNMENT

## 2021-04-22 VITALS
WEIGHT: 193 LBS | TEMPERATURE: 97.3 F | HEART RATE: 80 BPM | OXYGEN SATURATION: 98 % | HEIGHT: 61 IN | BODY MASS INDEX: 36.44 KG/M2 | RESPIRATION RATE: 18 BRPM | DIASTOLIC BLOOD PRESSURE: 87 MMHG | SYSTOLIC BLOOD PRESSURE: 118 MMHG

## 2021-04-22 DIAGNOSIS — E78.2 MIXED HYPERLIPIDEMIA: ICD-10-CM

## 2021-04-22 DIAGNOSIS — I10 ESSENTIAL HYPERTENSION: ICD-10-CM

## 2021-04-22 DIAGNOSIS — E66.9 OBESITY, CLASS II, BMI 35-39.9: ICD-10-CM

## 2021-04-22 DIAGNOSIS — R59.0 CERVICAL LYMPHADENOPATHY: Primary | ICD-10-CM

## 2021-04-22 PROCEDURE — 99214 OFFICE O/P EST MOD 30 MIN: CPT | Performed by: NURSE PRACTITIONER

## 2021-04-22 RX ORDER — CLONIDINE 0.1 MG/24H
1 PATCH, EXTENDED RELEASE TRANSDERMAL
Qty: 12 PATCH | Refills: 2 | Status: SHIPPED | OUTPATIENT
Start: 2021-04-22

## 2021-04-22 RX ORDER — NAPROXEN 500 MG/1
500 TABLET ORAL
COMMUNITY
Start: 2020-05-27 | End: 2021-12-29 | Stop reason: SDUPTHER

## 2021-04-22 RX ORDER — ERENUMAB-AOOE 140 MG/ML
140 INJECTION, SOLUTION SUBCUTANEOUS
COMMUNITY
Start: 2021-03-23

## 2021-04-22 RX ORDER — ONDANSETRON 8 MG/1
8 TABLET, ORALLY DISINTEGRATING ORAL
Qty: 20 TAB | Refills: 0 | Status: SHIPPED | OUTPATIENT
Start: 2021-04-22 | End: 2021-10-07 | Stop reason: SDUPTHER

## 2021-04-22 RX ORDER — GABAPENTIN 100 MG/1
CAPSULE ORAL
COMMUNITY
Start: 2021-04-08 | End: 2022-08-30 | Stop reason: SINTOL

## 2021-04-22 RX ORDER — OXYCODONE AND ACETAMINOPHEN 5; 325 MG/1; MG/1
TABLET ORAL
COMMUNITY
Start: 2021-04-12

## 2021-04-22 RX ORDER — ROSUVASTATIN CALCIUM 5 MG/1
5 TABLET, COATED ORAL
Qty: 90 TAB | Refills: 1 | Status: SHIPPED | OUTPATIENT
Start: 2021-04-22 | End: 2021-10-07 | Stop reason: SDUPTHER

## 2021-04-22 RX ORDER — NARATRIPTAN 2.5 MG/1
TABLET ORAL
COMMUNITY
Start: 2021-03-23

## 2021-04-22 RX ORDER — AMITRIPTYLINE HYDROCHLORIDE 25 MG/1
TABLET, FILM COATED ORAL
COMMUNITY
Start: 2021-03-23

## 2021-04-22 RX ORDER — ALBUTEROL SULFATE 90 UG/1
2 AEROSOL, METERED RESPIRATORY (INHALATION)
Qty: 1 INHALER | Refills: 6 | Status: SHIPPED | OUTPATIENT
Start: 2021-04-22 | End: 2022-10-28 | Stop reason: SDUPTHER

## 2021-04-22 RX ORDER — EPINEPHRINE 0.3 MG/.3ML
0.3 INJECTION SUBCUTANEOUS
Qty: 2 SYRINGE | Refills: 0 | Status: SHIPPED | OUTPATIENT
Start: 2021-04-22 | End: 2021-04-22

## 2021-04-22 RX ORDER — FLUTICASONE PROPIONATE AND SALMETEROL 250; 50 UG/1; UG/1
1 POWDER RESPIRATORY (INHALATION) EVERY 12 HOURS
Qty: 1 INHALER | Refills: 3 | Status: SHIPPED | OUTPATIENT
Start: 2021-04-22 | End: 2021-10-07 | Stop reason: SDUPTHER

## 2021-04-22 RX ORDER — EPINEPHRINE 0.3 MG/.3ML
INJECTION SUBCUTANEOUS
COMMUNITY
Start: 2021-01-23 | End: 2021-04-22 | Stop reason: SDUPTHER

## 2021-04-22 NOTE — PATIENT INSTRUCTIONS
Lymphadenitis: Care Instructions  Your Care Instructions  Lymph nodes are small, bean-shaped glands throughout the body. They help the body fight germs and infections. Lymphadenitis is a swelling of a lymph node. It can be caused by an infection or other condition. The infection is most often in a nearby part of the body. A common example is the lumps on both sides of your neck under the jaw that get tender and bigger when you have a cold or sore throat. Sometimes the lymph node itself may be infected. Usually the swollen lymph nodes go back to normal size without a problem. Treatment, if needed, focuses on treating the cause. For example, a bacterial infection may be treated with antibiotics. This should bring the node back to normal size. An infection caused by a virus often goes away on its own. In rare cases, a badly infected node may need to be drained by your doctor. Follow-up care is a key part of your treatment and safety. Be sure to make and go to all appointments, and call your doctor if you are having problems. It's also a good idea to know your test results and keep a list of the medicines you take. How can you care for yourself at home? · Be safe with medicines. ? If your doctor prescribed antibiotics, take them as directed. Do not stop taking them just because you feel better. You need to take the full course of antibiotics. ? Ask your doctor if you can take an over-the-counter pain medicine, such as acetaminophen (Tylenol), ibuprofen (Advil, Motrin), or naproxen (Aleve). Read and follow all instructions on the label. · If you have pain, try a warm compress. Soak a towel or washcloth in warm water. Wring it out, and place it on the affected skin. · Do not squeeze, drain, or puncture a painful lump. Doing this can irritate or inflame the lump, push any existing infection deeper into the skin, or cause severe bleeding. When should you call for help?    Call your doctor now or seek immediate medical care if:    · Your lymph nodes get bigger.     · The area becomes red and feels more tender.     · You have a fever that does not go away. Watch closely for changes in your health, and be sure to contact your doctor if:    · You do not get better as expected. Where can you learn more? Go to http://www.gray.com/  Enter O896 in the search box to learn more about \"Lymphadenitis: Care Instructions. \"  Current as of: September 23, 2020               Content Version: 12.8  © 2006-2021 [employer]. Care instructions adapted under license by [employer] (which disclaims liability or warranty for this information). If you have questions about a medical condition or this instruction, always ask your healthcare professional. [address] any warranty or liability for your use of this information.

## 2021-04-22 NOTE — PROGRESS NOTES
Layne Obrien is a 60 y.o. female who was seen in clinic today (4/22/2021) for Skin Problem (lump on neck)    Assessment & Plan:   Diagnoses and all orders for this visit:    1. Cervical lymphadenopathy  -     US THYROID/PARATHYROID/SOFT TISS; Future    2. Mixed hyperlipidemia  -     METABOLIC PANEL, COMPREHENSIVE; Future  -     LIPID PANEL; Future    3. Essential hypertension  -     METABOLIC PANEL, COMPREHENSIVE; Future  -     LIPID PANEL; Future    4. Obesity, Class II, BMI 35-39.9  -     HEMOGLOBIN A1C WITH EAG; Future    Other orders  -     rosuvastatin (CRESTOR) 5 mg tablet; Take 1 Tab by mouth nightly. -     cloNIDine (CATAPRES) 0.1 mg/24 hr ptwk; 1 Patch by TransDERmal route every seven (7) days. -     ondansetron (ZOFRAN ODT) 8 mg disintegrating tablet; Take 1 Tab by mouth every twelve (12) hours as needed for Nausea. -     albuterol (PROVENTIL HFA, VENTOLIN HFA, PROAIR HFA) 90 mcg/actuation inhaler; Take 2 Puffs by inhalation every four (4) hours as needed for Wheezing or Shortness of Breath. -     fluticasone propion-salmeteroL (ADVAIR/WIXELA) 250-50 mcg/dose diskus inhaler; Take 1 Puff by inhalation every twelve (12) hours. -     EPINEPHrine (EPIPEN) 0.3 mg/0.3 mL injection; 0.3 mL by IntraMUSCular route once as needed for Allergic Response for up to 1 dose. I have discussed the diagnosis with the patient and the intended plan as seen in the above orders. The patient has received an after-visit summary and questions were answered concerning future plans. I have discussed medication side effects and warnings with the patient as well. Patient agreeable with above plan and verbalizes understanding. Follow-up and Dispositions    · Return in about 2 weeks (around 5/6/2021) for discuss ultrasound results/need 4 month follow up for HLD/HTN with fasting labs 1 week prior. Subjective:   Patient reports she has a mobile lump on her neck that has been present since 2/27/2021. Comments lump seems to have decreased in size. Patient states she stopped taking singulair due to nausea and nausea hasn't been as bad. Reviewed most recent lab results and all questions answered. Lab Results   Component Value Date/Time    Sodium 142 03/23/2021 11:24 AM    Potassium 4.4 03/23/2021 11:24 AM    Chloride 107 03/23/2021 11:24 AM    CO2 26 03/23/2021 11:24 AM    Anion gap 9 03/23/2021 11:24 AM    Glucose 90 03/23/2021 11:24 AM    BUN 10 03/23/2021 11:24 AM    Creatinine 0.67 03/23/2021 11:24 AM    BUN/Creatinine ratio 15 03/23/2021 11:24 AM    GFR est AA >60 03/23/2021 11:24 AM    GFR est non-AA >60 03/23/2021 11:24 AM    Calcium 9.1 03/23/2021 11:24 AM    Bilirubin, total 0.4 03/23/2021 11:24 AM    Alk.  phosphatase 146 (H) 03/23/2021 11:24 AM    Protein, total 7.2 03/23/2021 11:24 AM    Albumin 3.9 03/23/2021 11:24 AM    Globulin 3.3 03/23/2021 11:24 AM    A-G Ratio 1.2 03/23/2021 11:24 AM    ALT (SGPT) 16 03/23/2021 11:24 AM    AST (SGOT) 12 03/23/2021 11:24 AM     Lab Results   Component Value Date/Time    Cholesterol, total 184 01/05/2021 02:07 PM    HDL Cholesterol 60 01/05/2021 02:07 PM    LDL, calculated 106.2 (H) 01/05/2021 02:07 PM    VLDL, calculated 17.8 01/05/2021 02:07 PM    Triglyceride 89 01/05/2021 02:07 PM    CHOL/HDL Ratio 3.1 01/05/2021 02:07 PM     Lab Results   Component Value Date/Time    Hemoglobin A1c 5.7 (H) 01/05/2021 02:07 PM       Lab Results   Component Value Date/Time    WBC 6.3 03/23/2021 11:24 AM    HGB 11.3 (L) 03/23/2021 11:24 AM    HCT 36.6 03/23/2021 11:24 AM    PLATELET 728 40/38/4212 11:24 AM    MCV 79.2 03/23/2021 11:24 AM     Wt Readings from Last 3 Encounters:   04/22/21 193 lb (87.5 kg)   03/23/21 191 lb (86.6 kg)   01/05/21 191 lb (86.6 kg)     Temp Readings from Last 3 Encounters:   04/22/21 97.3 °F (36.3 °C) (Temporal)   03/23/21 97.2 °F (36.2 °C) (Temporal)   01/05/21 99.5 °F (37.5 °C) (Temporal)     BP Readings from Last 3 Encounters:   04/22/21 118/87   03/23/21 135/80   01/05/21 128/87     Pulse Readings from Last 3 Encounters:   04/22/21 80   03/23/21 82   01/05/21 81     Prior to Admission medications    Medication Sig Start Date End Date Taking? Authorizing Provider   amitriptyline (ELAVIL) 25 mg tablet Take 1 tab po QHS, may increase to 2 tabs po QHS 3/23/21  Yes Provider, Historical   naratriptan (AMERGE) 2.5 mg tab Take 1 tab po at the onset of a migraine. Ma repeat once in 2 hours. Max 2 tabs/day. 3/23/21  Yes Provider, Historical   naproxen (NAPROSYN) 500 mg tablet Take 500 mg by mouth two (2) times daily as needed. 5/27/20  Yes Provider, Historical   erenumab-aooe (Aimovig Autoinjector) 140 mg/mL injection 140 mg by SubCUTAneous route. 3/23/21  Yes Provider, Historical   diphenhydrAMINE (Benadryl Allergy) 25 mg tablet Take 1 Tab by mouth every six (6) hours as needed (allergies). 3/24/21  Yes Eloy Urena NP   butalbital-acetaminophen-caffeine (FIORICET, ESGIC) -40 mg per tablet Take 1 Tab by mouth every six (6) hours as needed (for pain or headache). Not to exceed 4 tabs in 24 hrs 3/24/21  Yes Eloy Urena NP   rosuvastatin (CRESTOR) 5 mg tablet Take 1 Tab by mouth nightly. 2/27/21  Yes Eloy Urena NP   polyethylene glycol (MIRALAX) 17 gram/dose powder Take 17 g by mouth daily. 2/27/21  Yes Eloy Urena NP   albuterol (PROVENTIL HFA, VENTOLIN HFA, PROAIR HFA) 90 mcg/actuation inhaler Take 2 Puffs by inhalation every four (4) hours as needed for Wheezing or Shortness of Breath. 10/21/20  Yes Eloy Urena NP   albuterol (PROVENTIL VENTOLIN) 2.5 mg /3 mL (0.083 %) nebu 3 mL by Nebulization route every four (4) hours as needed for Wheezing or Shortness of Breath. 10/21/20  Yes Eloy Urena NP   fluticasone propion-salmeteroL (ADVAIR/WIXELA) 250-50 mcg/dose diskus inhaler Take 1 Puff by inhalation every twelve (12) hours.  10/21/20  Yes Eloy Urena NP   Transparent Dressings (Tegaderm Frame Style) 2 3/8 X 2 3/4 \" bndg To use with clonidine patch weekly 10/21/20  Yes Mariam MARTINEZ NP   montelukast (SINGULAIR) 5 mg chewable tablet Take 2 Tabs by mouth nightly. 10/8/20  Yes Lyn Obando NP   ondansetron (ZOFRAN ODT) 8 mg disintegrating tablet Take 1 Tab by mouth every twelve (12) hours as needed for Nausea. 9/21/20  Yes Lny Obando NP   cloNIDine (CATAPRES) 0.1 mg/24 hr ptwk 1 Patch by TransDERmal route every seven (7) days. 9/21/20  Yes Lyn Obando NP   ospemifene (OSPHENA) 60 mg tab tablet Take 1 Tab by mouth daily. 6/25/20  Yes Bowen Yoder MD   acetaminophen (Tylenol Extra Strength) 500 mg tablet Take 1 Tab by mouth every six (6) hours as needed for Pain (not to exceed 4 tabs in 24 hrs). 5/27/20  Yes Lyn Obando NP   Nebulizer & Compressor machine 1 Each by Does Not Apply route every four (4) hours as needed. 5/1/13  Yes Lyn Obando NP   gabapentin (NEURONTIN) 100 mg capsule TAKE 1 CAPSULE BY MOUTH AT BEDTIME 4/8/21   Provider, Historical   EPINEPHrine (EPIPEN) 0.3 mg/0.3 mL injection  1/23/21   Provider, Historical   oxyCODONE-acetaminophen (PERCOCET) 5-325 mg per tablet TAKE 1 TABLET BY MOUTH EVERY 8 HOURS AS NEEDED FOR 7 DAYS 4/12/21   Provider, Historical   hydrocortisone-pramoxine (PROCTOFOAM HC) rectal foam Insert 1 Applicator into rectum two (2) times a day. 3/24/21   Mariam MARTINEZ NP   amitriptyline (ELAVIL) 10 mg tablet Take 1 Tab by mouth nightly. 3/16/21 4/22/21  Mariam MARTINEZ NP   SUMAtriptan (IMITREX) 50 mg tablet Take 1 tabs by mouth at onset of headache. Then may repeat 1 tab in 2 hrs if needed 2/27/21 4/22/21  Mariam MARTINEZ NP   ibuprofen (MOTRIN) 800 mg tablet Take 1 Tab by mouth every eight (8) hours as needed for Pain. 10/21/20   Mariam MARTINEZ, NP   erenumab-aooe (AIMOVIG) 140 mg/mL injection 140 mg by SubCUTAneous route every month. 10/21/19 4/22/21  Provider, Historical   loratadine (CLARITIN REDITABS) 10 mg dissolvable tablet Take 1 Tab by mouth daily.  4/26/19   Mariam Lr T, NP     The following sections were reviewed & updated as appropriate: PMH, PSH, FH, and SH. Review of Systems   Constitutional: Negative for activity change, appetite change, chills, fatigue and fever. Respiratory: Negative for chest tightness and shortness of breath. Cardiovascular: Negative for chest pain. Neurological: Negative for dizziness and headaches. Objective:     Visit Vitals  /87 (BP 1 Location: Left arm, BP Patient Position: Sitting, BP Cuff Size: Adult)   Pulse 80   Temp 97.3 °F (36.3 °C) (Temporal)   Resp 18   Ht 5' 1\" (1.549 m)   Wt 193 lb (87.5 kg)   LMP 05/16/2014   SpO2 98%   BMI 36.47 kg/m²      Physical Exam  Constitutional:       General: She is not in acute distress. Appearance: She is well-developed. HENT:      Head: Normocephalic and atraumatic. Neck:      Musculoskeletal: Normal range of motion and neck supple. Vascular: No carotid bruit. Cardiovascular:      Rate and Rhythm: Normal rate and regular rhythm. Heart sounds: Normal heart sounds. No murmur. No friction rub. No gallop. Pulmonary:      Effort: Pulmonary effort is normal.      Breath sounds: Normal breath sounds. No decreased breath sounds, wheezing, rhonchi or rales. Lymphadenopathy:      Cervical: Cervical adenopathy present. Left cervical: Superficial cervical adenopathy (small non tender nodule) present. Skin:     General: Skin is warm and dry. Neurological:      Mental Status: She is alert and oriented to person, place, and time. Disclaimer: The patient understands our medical plan. Alternatives have been explained and offered. The risks, benefits and significant side effects of all medications have been reviewed. Anticipated time course and progression of condition reviewed. All questions have been addressed. She is encouraged to employ the information provided in the after visit summary, which was reviewed.       Where applicable, she is instructed to call the clinic if she has not been notified either by phone or through [address] with the results of her tests or with an appointment plan for any referrals within 1 week(s). No news is not good news; it's no news. The patient  is to call if her condition worsens or fails to improve or if significant side effects are experienced. Aspects of this note may have been generated using voice recognition software. Despite editing, there may be unrecognized errors.        Henry Rowan, NP

## 2021-04-22 NOTE — PROGRESS NOTES
Chief Complaint   Patient presents with    Skin Problem     lump on neck     1. Have you been to the ER, urgent care clinic since your last visit? Hospitalized since your last visit?had surgery recently    2. Have you seen or consulted any other health care providers outside of the [address] [place] [address] since your last visit? Include any pap smears or colon screening. No  Okay to discontinue medications no longer taking/therapy completed per verbal order Courtney Peraza NP-C    Learning Assessment 5/22/2015   PRIMARY LEARNER Patient   HIGHEST LEVEL OF EDUCATION - PRIMARY LEARNER  -   BARRIERS PRIMARY LEARNER -   CO-LEARNER CAREGIVER -   PRIMARY LANGUAGE ENGLISH   LEARNER PREFERENCE PRIMARY LISTENING     READING     DEMONSTRATION     -   ANSWERED BY patient   RELATIONSHIP SELF     3 most recent PHQ Screens 3/23/2021   Little interest or pleasure in doing things Not at all   Feeling down, depressed, irritable, or hopeless Not at all   Total Score PHQ 2 0   Trouble falling or staying asleep, or sleeping too much -   Feeling tired or having little energy -   Poor appetite, weight loss, or overeating -   Feeling bad about yourself - or that you are a failure or have let yourself or your family down -   Trouble concentrating on things such as school, work, reading, or watching TV -   Moving or speaking so slowly that other people could have noticed; or the opposite being so fidgety that others notice -   Thoughts of being better off dead, or hurting yourself in some way -   PHQ 9 Score -   How difficult have these problems made it for you to do your work, take care of your home and get along with others -     No flowsheet data found. Abuse Screening Questionnaire 3/23/2021   Do you ever feel afraid of your partner? N   Are you in a relationship with someone who physically or mentally threatens you? N   Is it safe for you to go home?  Alisia Carrillo

## 2021-05-05 ENCOUNTER — HOSPITAL ENCOUNTER (OUTPATIENT)
Dept: LAB | Age: 56
Discharge: HOME | End: 2021-05-05
Payer: OTHER GOVERNMENT

## 2021-05-05 ENCOUNTER — HOSPITAL ENCOUNTER (OUTPATIENT)
Dept: ULTRASOUND IMAGING | Age: 56
Discharge: HOME | End: 2021-05-05
Attending: NURSE PRACTITIONER
Payer: OTHER GOVERNMENT

## 2021-05-05 ENCOUNTER — APPOINTMENT (OUTPATIENT)
Dept: FAMILY MEDICINE CLINIC | Age: 56
End: 2021-05-05

## 2021-05-05 DIAGNOSIS — E78.2 MIXED HYPERLIPIDEMIA: ICD-10-CM

## 2021-05-05 DIAGNOSIS — E66.9 OBESITY, CLASS II, BMI 35-39.9: ICD-10-CM

## 2021-05-05 DIAGNOSIS — R59.0 CERVICAL LYMPHADENOPATHY: ICD-10-CM

## 2021-05-05 DIAGNOSIS — I10 ESSENTIAL HYPERTENSION: ICD-10-CM

## 2021-05-05 LAB
ALBUMIN SERPL-MCNC: 3.5 G/DL (ref 3.4–5)
ALBUMIN/GLOB SERPL: 1.1 {RATIO} (ref 0.8–1.7)
ALP SERPL-CCNC: 131 U/L (ref 45–117)
ALT SERPL-CCNC: 13 U/L (ref 13–56)
ANION GAP SERPL CALC-SCNC: 8 MMOL/L (ref 3–18)
AST SERPL-CCNC: 10 U/L (ref 10–38)
BILIRUB SERPL-MCNC: 0.5 MG/DL (ref 0.2–1)
BUN SERPL-MCNC: 8 MG/DL (ref 7–18)
BUN/CREAT SERPL: 14 (ref 12–20)
CALCIUM SERPL-MCNC: 8.5 MG/DL (ref 8.5–10.1)
CHLORIDE SERPL-SCNC: 108 MMOL/L (ref 100–111)
CHOLEST SERPL-MCNC: 200 MG/DL
CO2 SERPL-SCNC: 26 MMOL/L (ref 21–32)
CREAT SERPL-MCNC: 0.59 MG/DL (ref 0.6–1.3)
EST. AVERAGE GLUCOSE BLD GHB EST-MCNC: 108 MG/DL
GLOBULIN SER CALC-MCNC: 3.3 G/DL (ref 2–4)
GLUCOSE SERPL-MCNC: 81 MG/DL (ref 74–99)
HBA1C MFR BLD: 5.4 % (ref 4.2–5.6)
HDLC SERPL-MCNC: 57 MG/DL (ref 40–60)
HDLC SERPL: 3.5 {RATIO} (ref 0–5)
LDLC SERPL CALC-MCNC: 125.8 MG/DL (ref 0–100)
LIPID PROFILE,FLP: ABNORMAL
POTASSIUM SERPL-SCNC: 4 MMOL/L (ref 3.5–5.5)
PROT SERPL-MCNC: 6.8 G/DL (ref 6.4–8.2)
SODIUM SERPL-SCNC: 142 MMOL/L (ref 136–145)
TRIGL SERPL-MCNC: 86 MG/DL (ref ?–150)
VLDLC SERPL CALC-MCNC: 17.2 MG/DL

## 2021-05-05 PROCEDURE — 83036 HEMOGLOBIN GLYCOSYLATED A1C: CPT

## 2021-05-05 PROCEDURE — 80061 LIPID PANEL: CPT

## 2021-05-05 PROCEDURE — 76536 US EXAM OF HEAD AND NECK: CPT

## 2021-05-05 PROCEDURE — 80053 COMPREHEN METABOLIC PANEL: CPT

## 2021-05-05 PROCEDURE — 36415 COLL VENOUS BLD VENIPUNCTURE: CPT

## 2021-05-11 RX ORDER — ROSUVASTATIN CALCIUM 5 MG/1
5 TABLET, COATED ORAL
Qty: 90 TAB | Refills: 1 | Status: CANCELLED | OUTPATIENT
Start: 2021-05-11

## 2021-07-07 DIAGNOSIS — G43.009 NONINTRACTABLE MIGRAINE, UNSPECIFIED MIGRAINE TYPE: Primary | ICD-10-CM

## 2021-07-30 NOTE — PROGRESS NOTES
Okay to renew referral per verbal order Ranjith Murray NP-C
able to sustain latch approx. 2 minutes/sustained

## 2021-10-08 NOTE — TELEPHONE ENCOUNTER
NP Jett Chairez,    Requests for refills. Thanks, Kael Zunilda    Last Visit: 4/22/21 NP Jett Chairez  Next Appointment: Not scheduled  Previous Refill Encounter(s):   Acetaminophen 5/27/20 60  Albuterol inh 4/22/21 1 + 6  Ibuprofen 10/21/20 60  miralax 2/27/21 1521g + 1  Diphenhydramine 3/24/21 30 + 3  fioricet 3/24/21 40 + 1  Rosuvastatin 4/22/21 90 + 1  Ondansetron 4/22/21 20  Fluticasone  4/22/21 1+ 3    Requested Prescriptions     Pending Prescriptions Disp Refills    acetaminophen (Tylenol Extra Strength) 500 mg tablet 60 Tablet 0     Sig: Take 1 Tablet by mouth every six (6) hours as needed for Pain (not to exceed 4 tabs in 24 hrs).  albuterol (PROVENTIL VENTOLIN) 2.5 mg /3 mL (0.083 %) nebu 2 Each 3     Sig: 3 mL by Nebulization route every four (4) hours as needed for Wheezing or Shortness of Breath.  ibuprofen (MOTRIN) 800 mg tablet 60 Tablet 0     Sig: Take 1 Tablet by mouth every eight (8) hours as needed for Pain.  polyethylene glycol (MIRALAX) 17 gram/dose powder 1521 g 1     Sig: Take 17 g by mouth daily.  diphenhydrAMINE (Benadryl Allergy) 25 mg tablet 30 Tablet 3     Sig: Take 1 Tablet by mouth every six (6) hours as needed (allergies).  butalbital-acetaminophen-caffeine (FIORICET, ESGIC) -40 mg per tablet 40 Tablet 1     Sig: Take 1 Tablet by mouth every six (6) hours as needed (for pain or headache). Not to exceed 4 tabs in 24 hrs    rosuvastatin (CRESTOR) 5 mg tablet 90 Tablet 1     Sig: Take 1 Tablet by mouth nightly.  ondansetron (ZOFRAN ODT) 8 mg disintegrating tablet 20 Tablet 0     Sig: Take 1 Tablet by mouth every twelve (12) hours as needed for Nausea.  fluticasone propion-salmeteroL (ADVAIR/WIXELA) 250-50 mcg/dose diskus inhaler       Sig: Take 1 Puff by inhalation every twelve (12) hours.

## 2021-10-12 RX ORDER — ALBUTEROL SULFATE 0.83 MG/ML
2.5 SOLUTION RESPIRATORY (INHALATION)
Qty: 2 EACH | Refills: 3 | Status: SHIPPED | OUTPATIENT
Start: 2021-10-12 | End: 2022-10-28 | Stop reason: SDUPTHER

## 2021-10-12 RX ORDER — FLUTICASONE PROPIONATE AND SALMETEROL 250; 50 UG/1; UG/1
1 POWDER RESPIRATORY (INHALATION) EVERY 12 HOURS
Qty: 1 EACH | Refills: 2 | Status: SHIPPED | OUTPATIENT
Start: 2021-10-12 | End: 2022-01-21 | Stop reason: SDUPTHER

## 2021-10-12 RX ORDER — POLYETHYLENE GLYCOL 3350 17 G/17G
17 POWDER, FOR SOLUTION ORAL DAILY
Qty: 1521 G | Refills: 1 | Status: SHIPPED | OUTPATIENT
Start: 2021-10-12 | End: 2021-12-27 | Stop reason: SDUPTHER

## 2021-10-12 RX ORDER — ACETAMINOPHEN 500 MG
500 TABLET ORAL
Qty: 60 TABLET | Refills: 0 | Status: SHIPPED | OUTPATIENT
Start: 2021-10-12 | End: 2021-12-27 | Stop reason: SDUPTHER

## 2021-10-12 RX ORDER — BUTALBITAL, ACETAMINOPHEN AND CAFFEINE 50; 325; 40 MG/1; MG/1; MG/1
1 TABLET ORAL
Qty: 40 TABLET | Refills: 1 | Status: SHIPPED | OUTPATIENT
Start: 2021-10-12 | End: 2021-12-27 | Stop reason: SDUPTHER

## 2021-10-12 RX ORDER — ONDANSETRON 8 MG/1
8 TABLET, ORALLY DISINTEGRATING ORAL
Qty: 20 TABLET | Refills: 0 | Status: SHIPPED | OUTPATIENT
Start: 2021-10-12 | End: 2021-12-27 | Stop reason: SDUPTHER

## 2021-10-12 RX ORDER — ROSUVASTATIN CALCIUM 5 MG/1
5 TABLET, COATED ORAL
Qty: 90 TABLET | Refills: 1 | Status: SHIPPED | OUTPATIENT
Start: 2021-10-12 | End: 2022-04-18 | Stop reason: SDUPTHER

## 2021-10-12 RX ORDER — IBUPROFEN 800 MG/1
800 TABLET ORAL
Qty: 60 TABLET | Refills: 0 | Status: SHIPPED | OUTPATIENT
Start: 2021-10-12 | End: 2022-10-28 | Stop reason: SDUPTHER

## 2021-10-12 RX ORDER — DIPHENHYDRAMINE HCL 25 MG
25 TABLET ORAL
Qty: 30 TABLET | Refills: 3 | Status: SHIPPED | OUTPATIENT
Start: 2021-10-12 | End: 2022-06-28 | Stop reason: SDUPTHER

## 2021-11-15 ENCOUNTER — CLINICAL SUPPORT (OUTPATIENT)
Dept: FAMILY MEDICINE CLINIC | Age: 56
End: 2021-11-15
Payer: OTHER GOVERNMENT

## 2021-11-15 VITALS — TEMPERATURE: 96.7 F

## 2021-11-15 DIAGNOSIS — Z23 NEEDS FLU SHOT: Primary | ICD-10-CM

## 2021-11-15 PROCEDURE — 90471 IMMUNIZATION ADMIN: CPT | Performed by: NURSE PRACTITIONER

## 2021-11-15 PROCEDURE — 90686 IIV4 VACC NO PRSV 0.5 ML IM: CPT | Performed by: NURSE PRACTITIONER

## 2021-11-15 NOTE — PROGRESS NOTES
Beto Ledbetter is a 60 y.o. female who presents for routine immunizations. She denies any symptoms , reactions or allergies that would exclude them from being immunized today. Risks and adverse reactions were discussed and the VIS was given to them. All questions were addressed. She refused to stay for observation. There were no reaction (s) observed when patient left clinic. Verbal order for Flu IM received per Fantasma Noe NP for pt Beto Ledbetter. Order written down and read back to provider for verification prior to completion.

## 2021-12-16 ENCOUNTER — OFFICE VISIT (OUTPATIENT)
Dept: FAMILY MEDICINE CLINIC | Age: 56
End: 2021-12-16
Payer: OTHER GOVERNMENT

## 2021-12-16 ENCOUNTER — HOSPITAL ENCOUNTER (OUTPATIENT)
Dept: LAB | Age: 56
Discharge: HOME | End: 2021-12-16
Payer: OTHER GOVERNMENT

## 2021-12-16 VITALS
WEIGHT: 181.8 LBS | HEART RATE: 98 BPM | DIASTOLIC BLOOD PRESSURE: 78 MMHG | RESPIRATION RATE: 18 BRPM | BODY MASS INDEX: 34.32 KG/M2 | HEIGHT: 61 IN | SYSTOLIC BLOOD PRESSURE: 110 MMHG | TEMPERATURE: 98.1 F | OXYGEN SATURATION: 98 %

## 2021-12-16 DIAGNOSIS — I10 ESSENTIAL HYPERTENSION: ICD-10-CM

## 2021-12-16 DIAGNOSIS — E78.2 MIXED HYPERLIPIDEMIA: Primary | ICD-10-CM

## 2021-12-16 DIAGNOSIS — R73.09 ELEVATED HEMOGLOBIN A1C: ICD-10-CM

## 2021-12-16 DIAGNOSIS — R22.32 MASS OF SKIN OF FINGER OF LEFT HAND: ICD-10-CM

## 2021-12-16 DIAGNOSIS — E78.2 MIXED HYPERLIPIDEMIA: ICD-10-CM

## 2021-12-16 LAB
ALBUMIN SERPL-MCNC: 3.7 G/DL (ref 3.4–5)
ALBUMIN/GLOB SERPL: 1.2 {RATIO} (ref 0.8–1.7)
ALP SERPL-CCNC: 141 U/L (ref 45–117)
ALT SERPL-CCNC: 15 U/L (ref 13–56)
ANION GAP SERPL CALC-SCNC: 5 MMOL/L (ref 3–18)
AST SERPL-CCNC: 11 U/L (ref 10–38)
BILIRUB SERPL-MCNC: 0.3 MG/DL (ref 0.2–1)
BUN SERPL-MCNC: 8 MG/DL (ref 7–18)
BUN/CREAT SERPL: 11 (ref 12–20)
CALCIUM SERPL-MCNC: 8.9 MG/DL (ref 8.5–10.1)
CHLORIDE SERPL-SCNC: 109 MMOL/L (ref 100–111)
CHOLEST SERPL-MCNC: 196 MG/DL
CO2 SERPL-SCNC: 27 MMOL/L (ref 21–32)
CREAT SERPL-MCNC: 0.74 MG/DL (ref 0.6–1.3)
EST. AVERAGE GLUCOSE BLD GHB EST-MCNC: 117 MG/DL
GLOBULIN SER CALC-MCNC: 3.2 G/DL (ref 2–4)
GLUCOSE SERPL-MCNC: 90 MG/DL (ref 74–99)
HBA1C MFR BLD: 5.7 % (ref 4.2–5.6)
HDLC SERPL-MCNC: 60 MG/DL (ref 40–60)
HDLC SERPL: 3.3 {RATIO} (ref 0–5)
LDLC SERPL CALC-MCNC: 112.4 MG/DL (ref 0–100)
LIPID PROFILE,FLP: ABNORMAL
POTASSIUM SERPL-SCNC: 4.1 MMOL/L (ref 3.5–5.5)
PROT SERPL-MCNC: 6.9 G/DL (ref 6.4–8.2)
SODIUM SERPL-SCNC: 141 MMOL/L (ref 136–145)
TRIGL SERPL-MCNC: 118 MG/DL (ref ?–150)
VLDLC SERPL CALC-MCNC: 23.6 MG/DL

## 2021-12-16 PROCEDURE — 36415 COLL VENOUS BLD VENIPUNCTURE: CPT

## 2021-12-16 PROCEDURE — 80061 LIPID PANEL: CPT

## 2021-12-16 PROCEDURE — 83036 HEMOGLOBIN GLYCOSYLATED A1C: CPT

## 2021-12-16 PROCEDURE — 80053 COMPREHEN METABOLIC PANEL: CPT

## 2021-12-16 PROCEDURE — 99214 OFFICE O/P EST MOD 30 MIN: CPT | Performed by: NURSE PRACTITIONER

## 2021-12-16 RX ORDER — MONTELUKAST SODIUM 10 MG/1
10 TABLET ORAL
Qty: 90 TABLET | Refills: 1 | Status: SHIPPED | OUTPATIENT
Start: 2021-12-16 | End: 2022-08-03 | Stop reason: SDUPTHER

## 2021-12-16 NOTE — PROGRESS NOTES
Dick Vincent presents today for   Chief Complaint   Patient presents with    Other     pt has bump on left hand        Is someone accompanying this pt? no    Is the patient using any DME equipment during OV? no    Coordination of Care:  1. Have you been to the ER, urgent care clinic since your last visit? Hospitalized since your last visit? no    2. Have you seen or consulted any other health care providers outside of the [address] [place] [address] since your last visit? Include any pap smears or colon screening. no          Depression Screening:  3 most recent PHQ Screens 12/16/2021   Little interest or pleasure in doing things Not at all   Feeling down, depressed, irritable, or hopeless Not at all   Total Score PHQ 2 0   Trouble falling or staying asleep, or sleeping too much -   Feeling tired or having little energy -   Poor appetite, weight loss, or overeating -   Feeling bad about yourself - or that you are a failure or have let yourself or your family down -   Trouble concentrating on things such as school, work, reading, or watching TV -   Moving or speaking so slowly that other people could have noticed; or the opposite being so fidgety that others notice -   Thoughts of being better off dead, or hurting yourself in some way -   PHQ 9 Score -   How difficult have these problems made it for you to do your work, take care of your home and get along with others -       Learning Assessment:  Learning Assessment 5/22/2015   PRIMARY LEARNER Patient   HIGHEST LEVEL OF EDUCATION - PRIMARY LEARNER  -   BARRIERS PRIMARY LEARNER -   CO-LEARNER CAREGIVER -   PRIMARY LANGUAGE ENGLISH   LEARNER PREFERENCE PRIMARY LISTENING     READING     DEMONSTRATION     -   ANSWERED BY patient   RELATIONSHIP SELF       Abuse Screening:  Abuse Screening Questionnaire 3/23/2021   Do you ever feel afraid of your partner? N   Are you in a relationship with someone who physically or mentally threatens you?  N   Is it safe for you to go home? Y       Fall Risk  No flowsheet data found. ADL  No flowsheet data found. Travel Screening:    Travel Screening     Question   Response    In the last month, have you been in contact with someone who was confirmed or suspected to have Coronavirus / COVID-19? No / Unsure    Have you had a COVID-19 viral test in the last 14 days? No    Do you have any of the following new or worsening symptoms? None of these    Have you traveled internationally or domestically in the last month? No      Travel History   Travel since 11/16/21    No documented travel since 11/16/21         Health Maintenance reviewed and discussed and ordered per Provider. Health Maintenance Due   Topic Date Due    COVID-19 Vaccine (3 - Booster for Moderna series) 09/16/2021   .

## 2021-12-16 NOTE — PROGRESS NOTES
Dick Vincent is a 60 y.o. female who was seen in clinic today (12/16/2021) for Other (pt has bump on left hand )    Assessment & Plan:   Diagnoses and all orders for this visit:    1. Mixed hyperlipidemia  -     LIPID PANEL; Future  -     METABOLIC PANEL, COMPREHENSIVE; Future    2. Essential hypertension  -     LIPID PANEL; Future  -     METABOLIC PANEL, COMPREHENSIVE; Future    3. Mass of skin of finger of left hand    4. Elevated hemoglobin A1c  -     HEMOGLOBIN A1C WITH EAG; Future    Other orders  -     montelukast (SINGULAIR) 10 mg tablet; Take 1 Tablet by mouth nightly. I have discussed the diagnosis with the patient and the intended plan as seen in the above orders. The patient has received an after-visit summary and questions were answered concerning future plans. I have discussed medication side effects and warnings with the patient as well. Patient agreeable with above plan and verbalizes understanding. Follow-up and Dispositions    · Return in about 6 months (around 6/16/2022), or if symptoms worsen or fail to improve, for HTN/HLD, in office follow up, fasting labs 1 wk prior. Subjective:   Patient states she noticed a non tender nodule to left 4th posterior finger. Reports area has been present for several months  Hypertension ROS: not taking medications regularly as instructed, no TIA's, no chest pain on exertion, no dyspnea on exertion, no swelling of ankles. Patient states she has been on the clonidine patch in 2 months. States she forgot to put patch on one day and also stopped eating popcorn.     Lab Results   Component Value Date/Time    Sodium 142 05/05/2021 10:53 AM    Potassium 4.0 05/05/2021 10:53 AM    Chloride 108 05/05/2021 10:53 AM    CO2 26 05/05/2021 10:53 AM    Anion gap 8 05/05/2021 10:53 AM    Glucose 81 05/05/2021 10:53 AM    BUN 8 05/05/2021 10:53 AM    Creatinine 0.59 (L) 05/05/2021 10:53 AM    BUN/Creatinine ratio 14 05/05/2021 10:53 AM    GFR est AA >60 05/05/2021 10:53 AM    GFR est non-AA >60 05/05/2021 10:53 AM    Calcium 8.5 05/05/2021 10:53 AM    Bilirubin, total 0.5 05/05/2021 10:53 AM    Alk. phosphatase 131 (H) 05/05/2021 10:53 AM    Protein, total 6.8 05/05/2021 10:53 AM    Albumin 3.5 05/05/2021 10:53 AM    Globulin 3.3 05/05/2021 10:53 AM    A-G Ratio 1.1 05/05/2021 10:53 AM    ALT (SGPT) 13 05/05/2021 10:53 AM    AST (SGOT) 10 05/05/2021 10:53 AM     Lab Results   Component Value Date/Time    Cholesterol, total 200 (H) 05/05/2021 10:53 AM    HDL Cholesterol 57 05/05/2021 10:53 AM    LDL, calculated 125.8 (H) 05/05/2021 10:53 AM    VLDL, calculated 17.2 05/05/2021 10:53 AM    Triglyceride 86 05/05/2021 10:53 AM    CHOL/HDL Ratio 3.5 05/05/2021 10:53 AM     Lab Results   Component Value Date/Time    Hemoglobin A1c 5.4 05/05/2021 10:53 AM     Lab Results   Component Value Date/Time    WBC 6.3 03/23/2021 11:24 AM    HGB 11.3 (L) 03/23/2021 11:24 AM    HCT 36.6 03/23/2021 11:24 AM    PLATELET 365 81/18/0800 11:24 AM    MCV 79.2 03/23/2021 11:24 AM     Wt Readings from Last 3 Encounters:   12/16/21 181 lb 12.8 oz (82.5 kg)   04/22/21 193 lb (87.5 kg)   03/23/21 191 lb (86.6 kg)     Temp Readings from Last 3 Encounters:   12/16/21 98.1 °F (36.7 °C) (Temporal)   11/15/21 (!) 96.7 °F (35.9 °C) (Temporal)   04/22/21 97.3 °F (36.3 °C) (Temporal)     BP Readings from Last 3 Encounters:   12/16/21 (!) 129/96   04/22/21 118/87   03/23/21 135/80     Pulse Readings from Last 3 Encounters:   12/16/21 98   04/22/21 80   03/23/21 82     Prior to Admission medications    Medication Sig Start Date End Date Taking? Authorizing Provider   acetaminophen (Tylenol Extra Strength) 500 mg tablet Take 1 Tablet by mouth every six (6) hours as needed for Pain (not to exceed 4 tabs in 24 hrs).  10/12/21  Yes Sinan Tyler NP   albuterol (PROVENTIL VENTOLIN) 2.5 mg /3 mL (0.083 %) nebu 3 mL by Nebulization route every four (4) hours as needed for Wheezing or Shortness of Breath. 10/12/21  Yes Vivien Pillai NP   ibuprofen (MOTRIN) 800 mg tablet Take 1 Tablet by mouth every eight (8) hours as needed for Pain. 10/12/21  Yes Vivien Pillai NP   polyethylene glycol (MIRALAX) 17 gram/dose powder Take 17 g by mouth daily. 10/12/21  Yes Vivien Pillai NP   diphenhydrAMINE (Benadryl Allergy) 25 mg tablet Take 1 Tablet by mouth every six (6) hours as needed (allergies). 10/12/21  Yes Vivien Pillai NP   butalbital-acetaminophen-caffeine (FIORICET, ESGIC) -40 mg per tablet Take 1 Tablet by mouth every six (6) hours as needed (for pain or headache). Not to exceed 4 tabs in 24 hrs 10/12/21  Yes Vivien Pillai NP   rosuvastatin (CRESTOR) 5 mg tablet Take 1 Tablet by mouth nightly. 10/12/21  Yes Vivien Pillai NP   ondansetron (ZOFRAN ODT) 8 mg disintegrating tablet Take 1 Tablet by mouth every twelve (12) hours as needed for Nausea. 10/12/21  Yes Vivien Pillai NP   fluticasone propion-salmeteroL (ADVAIR/WIXELA) 250-50 mcg/dose diskus inhaler Take 1 Puff by inhalation every twelve (12) hours. 10/12/21  Yes Vivien Pillai NP   amitriptyline (ELAVIL) 25 mg tablet Take 1 tab po QHS, may increase to 2 tabs po QHS 3/23/21  Yes Provider, Historical   naratriptan (AMERGE) 2.5 mg tab Take 1 tab po at the onset of a migraine. Ma repeat once in 2 hours. Max 2 tabs/day. 3/23/21  Yes Provider, Historical   naproxen (NAPROSYN) 500 mg tablet Take 500 mg by mouth two (2) times daily as needed. 5/27/20  Yes Provider, Historical   gabapentin (NEURONTIN) 100 mg capsule TAKE 1 CAPSULE BY MOUTH AT BEDTIME 4/8/21  Yes Provider, Historical   oxyCODONE-acetaminophen (PERCOCET) 5-325 mg per tablet TAKE 1 TABLET BY MOUTH EVERY 8 HOURS AS NEEDED FOR 7 DAYS 4/12/21  Yes Provider, Historical   erenumab-aooe (Aimovig Autoinjector) 140 mg/mL injection 140 mg by SubCUTAneous route. 3/23/21  Yes Provider, Historical   cloNIDine (CATAPRES) 0.1 mg/24 hr ptwk 1 Patch by TransDERmal route every seven (7) days. 4/22/21  Yes Priya Luque NP   albuterol (PROVENTIL HFA, VENTOLIN HFA, PROAIR HFA) 90 mcg/actuation inhaler Take 2 Puffs by inhalation every four (4) hours as needed for Wheezing or Shortness of Breath. 4/22/21  Yes Priya Luque NP   hydrocortisone-pramoxine (PROCTOFOAM HC) rectal foam Insert 1 Applicator into rectum two (2) times a day. 3/24/21  Yes Priya Luque NP   Transparent Dressings (Tegaderm Frame Style) 2 3/8 X 2 3/4 \" bndg To use with clonidine patch weekly 10/21/20  Yes lEi MARTINEZ NP   montelukast (SINGULAIR) 5 mg chewable tablet Take 2 Tabs by mouth nightly. 10/8/20  Yes Priya Luque NP   ospemifene (OSPHENA) 60 mg tab tablet Take 1 Tab by mouth daily. 6/25/20  Yes Keny Moyer MD   loratadine (CLARITIN REDITABS) 10 mg dissolvable tablet Take 1 Tab by mouth daily. 4/26/19  Yes Priya Luque NP   Nebulizer & Compressor machine 1 Each by Does Not Apply route every four (4) hours as needed. 5/1/13  Yes Priya Luque NP     The following sections were reviewed & updated as appropriate: PMH, PSH, FH, and SH. Review of Systems   Constitutional: Negative for activity change, appetite change, chills, fatigue and fever. Respiratory: Negative for chest tightness and shortness of breath. Cardiovascular: Negative for chest pain. Neurological: Negative for dizziness and headaches. Objective:     Visit Vitals  /78 (BP 1 Location: Right upper arm, BP Patient Position: Sitting, BP Cuff Size: Adult)   Pulse 98   Temp 98.1 °F (36.7 °C) (Temporal)   Resp 18   Ht 5' 1\" (1.549 m)   Wt 181 lb 12.8 oz (82.5 kg)   LMP 05/16/2014   SpO2 98%   BMI 34.35 kg/m²      Physical Exam  Constitutional:       General: She is not in acute distress. Appearance: She is well-developed. HENT:      Head: Normocephalic and atraumatic. Neck:      Vascular: No carotid bruit. Cardiovascular:      Rate and Rhythm: Normal rate and regular rhythm. Heart sounds: Normal heart sounds.  No murmur heard. No friction rub. No gallop. Pulmonary:      Effort: Pulmonary effort is normal.      Breath sounds: Normal breath sounds. No decreased breath sounds, wheezing, rhonchi or rales. Abdominal:      General: Bowel sounds are normal.      Palpations: Abdomen is soft. Tenderness: There is no abdominal tenderness. Musculoskeletal:        Hands:       Cervical back: Normal range of motion and neck supple. Lymphadenopathy:      Cervical: No cervical adenopathy. Skin:     General: Skin is warm and dry. Neurological:      Mental Status: She is alert and oriented to person, place, and time. Disclaimer: The patient understands our medical plan. Alternatives have been explained and offered. The risks, benefits and significant side effects of all medications have been reviewed. Anticipated time course and progression of condition reviewed. All questions have been addressed. She is encouraged to employ the information provided in the after visit summary, which was reviewed. Where applicable, she is instructed to call the clinic if she has not been notified either by phone or through [address] with the results of her tests or with an appointment plan for any referrals within 1 week(s). No news is not good news; it's no news. The patient  is to call if her condition worsens or fails to improve or if significant side effects are experienced. Aspects of this note may have been generated using voice recognition software. Despite editing, there may be unrecognized errors.        Andrea Barney NP

## 2022-01-21 NOTE — TELEPHONE ENCOUNTER
Last Visit: 12/16/21 with NP Matthew Feliz  Next Appointment: Advised to follow-up in 6 months  Previous Refill Encounter(s): 10/12/21 Advair #1 with 2 refills, 1/10/22 Zofran #20    Requested Prescriptions     Pending Prescriptions Disp Refills    fluticasone propion-salmeteroL (ADVAIR/WIXELA) 250-50 mcg/dose diskus inhaler 3 Each 1     Sig: Take 1 Puff by inhalation every twelve (12) hours.  ondansetron (ZOFRAN ODT) 8 mg disintegrating tablet 20 Tablet 0     Sig: Take 1 Tablet by mouth every twelve (12) hours as needed for Nausea.

## 2022-01-28 RX ORDER — FLUTICASONE PROPIONATE AND SALMETEROL 250; 50 UG/1; UG/1
1 POWDER RESPIRATORY (INHALATION) EVERY 12 HOURS
Qty: 3 EACH | Refills: 1 | Status: SHIPPED | OUTPATIENT
Start: 2022-01-28 | End: 2022-10-28 | Stop reason: SDUPTHER

## 2022-01-28 RX ORDER — ONDANSETRON 8 MG/1
8 TABLET, ORALLY DISINTEGRATING ORAL
Qty: 20 TABLET | Refills: 0 | Status: SHIPPED | OUTPATIENT
Start: 2022-01-28 | End: 2022-06-28 | Stop reason: SDUPTHER

## 2022-03-20 PROBLEM — E66.01 SEVERE OBESITY (HCC): Status: ACTIVE | Noted: 2020-03-10

## 2022-04-18 ENCOUNTER — TRANSCRIBE ORDER (OUTPATIENT)
Dept: SCHEDULING | Age: 57
End: 2022-04-18

## 2022-04-18 DIAGNOSIS — Z12.31 SCREENING MAMMOGRAM FOR HIGH-RISK PATIENT: Primary | ICD-10-CM

## 2022-04-19 NOTE — TELEPHONE ENCOUNTER
Last Visit: 12/16/21 with NP Marco Gonzalez  Next Appointment: Advised to follow-up in 6 months  Previous Refill Encounter(s): 3/24/21 Proctofoam #1, 10/12/21 Crestor #90 with 1 refill, 1/10/22 Tylenol #60    Requested Prescriptions     Pending Prescriptions Disp Refills    hydrocortisone-pramoxine (PROCTOFOAM HC) rectal foam 1 Each 0     Sig: Insert 1 Applicator into rectum two (2) times a day.  rosuvastatin (CRESTOR) 5 mg tablet 90 Tablet 1     Sig: Take 1 Tablet by mouth nightly.  acetaminophen (Tylenol Extra Strength) 500 mg tablet 60 Tablet 0     Sig: Take 1 Tablet by mouth every six (6) hours as needed for Pain (not to exceed 4 tabs in 24 hrs).

## 2022-05-03 ENCOUNTER — HOSPITAL ENCOUNTER (OUTPATIENT)
Dept: GENERAL RADIOLOGY | Age: 57
Discharge: ANOTHER HOSPITAL | End: 2022-05-03
Payer: OTHER GOVERNMENT

## 2022-05-03 ENCOUNTER — OFFICE VISIT (OUTPATIENT)
Dept: FAMILY MEDICINE CLINIC | Age: 57
End: 2022-05-03
Payer: OTHER GOVERNMENT

## 2022-05-03 VITALS
BODY MASS INDEX: 34.55 KG/M2 | OXYGEN SATURATION: 98 % | WEIGHT: 183 LBS | DIASTOLIC BLOOD PRESSURE: 88 MMHG | HEART RATE: 83 BPM | HEIGHT: 61 IN | RESPIRATION RATE: 15 BRPM | TEMPERATURE: 97 F | SYSTOLIC BLOOD PRESSURE: 124 MMHG

## 2022-05-03 DIAGNOSIS — M25.511 ACUTE PAIN OF RIGHT SHOULDER: Primary | ICD-10-CM

## 2022-05-03 DIAGNOSIS — M25.511 ACUTE PAIN OF RIGHT SHOULDER: ICD-10-CM

## 2022-05-03 PROCEDURE — 99214 OFFICE O/P EST MOD 30 MIN: CPT | Performed by: NURSE PRACTITIONER

## 2022-05-03 PROCEDURE — 73030 X-RAY EXAM OF SHOULDER: CPT

## 2022-05-03 RX ORDER — METHOCARBAMOL 500 MG/1
500 TABLET, FILM COATED ORAL
Qty: 120 TABLET | Refills: 0 | Status: SHIPPED | OUTPATIENT
Start: 2022-05-03 | End: 2022-10-04

## 2022-05-03 NOTE — PROGRESS NOTES
1. \"Have you been to the ER, urgent care clinic since your last visit? Hospitalized since your last visit? \" No    2. \"Have you seen or consulted any other health care providers outside of the [address] [place] [address] since your last visit? \" No     3. For patients aged 39-70: Has the patient had a colonoscopy / FIT/ Cologuard? Yes - no Care Gap present      If the patient is female:    4. For patients aged 41-77: Has the patient had a mammogram within the past 2 years? Yes - no Care Gap present      5. For patients aged 21-65: Has the patient had a pap smear?  Yes - no Care Gap present

## 2022-05-03 NOTE — PATIENT INSTRUCTIONS
Shoulder Arthritis: Exercises  Introduction  Here are some examples of exercises for you to try. The exercises may be suggested for a condition or for rehabilitation. Start each exercise slowly. Ease off the exercises if you start to have pain. You will be told when to start these exercises and which ones will work best for you. How to do the exercises  Shoulder flexion (lying down)    To make a wand for this exercise, use a piece of PVC pipe or a broom handle with the broom removed. Make the wand about a foot wider than your shoulders. 1. Lie on your back, holding a wand with both hands. Your palms should face down as you hold the wand. 2. Keeping your elbows straight, slowly raise your arms over your head. Raise them until you feel a stretch in your shoulders, upper back, and chest.  3. Hold for 15 to 30 seconds. 4. Repeat 2 to 4 times. Shoulder rotation (lying down)    To make a wand for this exercise, use a piece of PVC pipe or a broom handle with the broom removed. Make the wand about a foot wider than your shoulders. 1. Lie on your back. Hold a wand with both hands with your elbows bent and palms up. 2. Keep your elbows close to your body, and move the wand across your body toward the sore arm. 3. Hold for 8 to 12 seconds. 4. Repeat 2 to 4 times. Shoulder internal rotation with towel    1. Hold a towel above and behind your head with the arm that is not sore. 2. With your sore arm, reach behind your back and grasp the towel. 3. With the arm above your head, pull the towel upward. Do this until you feel a stretch on the front and outside of your sore shoulder. 4. Hold 15 to 30 seconds. 5. Repeat 2 to 4 times. Shoulder blade squeeze    1. Stand with your arms at your sides, and squeeze your shoulder blades together. Do not raise your shoulders up as you squeeze. 2. Hold 6 seconds. 3. Repeat 8 to 12 times.   Resisted rows    For this exercise, you will need elastic exercise material, such as surgical tubing or Thera-Band. 1. Put the band around a solid object at about waist level. (A bedpost will work well.) Each hand should hold an end of the band. 2. With your elbows at your sides and bent to 90 degrees, pull the band back. Your shoulder blades should move toward each other. Return to the starting position. 3. Repeat 8 to 12 times. External rotator strengthening exercise    1. Start by tying a piece of elastic exercise material to a doorknob. You can use surgical tubing or Thera-Band. (You may also hold one end of the band in each hand.)  2. Stand or sit with your shoulder relaxed and your elbow bent 90 degrees. Your upper arm should rest comfortably against your side. Squeeze a rolled towel between your elbow and your body for comfort. This will help keep your arm at your side. 3. Hold one end of the elastic band with the hand of the painful arm. 4. Start with your forearm across your belly. Slowly rotate the forearm out away from your body. Keep your elbow and upper arm tucked against the towel roll or the side of your body until you begin to feel tightness in your shoulder. Slowly move your arm back to where you started. 5. Repeat 8 to 12 times. Internal rotator strengthening exercise    1. Start by tying a piece of elastic exercise material to a doorknob. You can use surgical tubing or Thera-Band. 2. Stand or sit with your shoulder relaxed and your elbow bent 90 degrees. Your upper arm should rest comfortably against your side. Squeeze a rolled towel between your elbow and your body for comfort. This will help keep your arm at your side. 3. Hold one end of the elastic band in the hand of the painful arm. 4. Slowly rotate your forearm toward your body until it touches your belly. Slowly move it back to where you started. 5. Keep your elbow and upper arm firmly tucked against the towel roll or at your side. 6. Repeat 8 to 12 times.   Pendulum swing    If you have pain in your back, do not do this exercise. 1. Hold on to a table or the back of a chair with your good arm. Then bend forward a little and let your sore arm hang straight down. This exercise does not use the arm muscles. Rather, use your legs and your hips to create movement that makes your arm swing freely. 2. Use the movement from your hips and legs to guide the slightly swinging arm back and forth like a pendulum (or elephant trunk). Then guide it in circles that start small (about the size of a dinner plate). Make the circles a bit larger each day, as your pain allows. 3. Do this exercise for 5 minutes, 5 to 7 times each day. 4. As you have less pain, try bending over a little farther to do this exercise. This will increase the amount of movement at your shoulder. Follow-up care is a key part of your treatment and safety. Be sure to make and go to all appointments, and call your doctor if you are having problems. It's also a good idea to know your test results and keep a list of the medicines you take. Where can you learn more? Go to http://www.gray.com/  Enter H562 in the search box to learn more about \"Shoulder Arthritis: Exercises. \"  Current as of: July 1, 2021               Content Version: 13.2  © 2006-2022 [employer], [employer]. Care instructions adapted under license by [employer] (which disclaims liability or warranty for this information). If you have questions about a medical condition or this instruction, always ask your healthcare professional. [place] [address] any warranty or liability for your use of this information. Shoulder Pain: Care Instructions  Your Care Instructions     You can hurt your shoulder by using it too much during an activity, such as fishing or baseball. It can also happen as part of the everyday wear and tear of getting older. Shoulder injuries can be slow to heal, but your shoulder should get better with time.   Your doctor may recommend a sling to rest your shoulder. If you have injured your shoulder, you may need testing and treatment. Follow-up care is a key part of your treatment and safety. Be sure to make and go to all appointments, and call your doctor if you are having problems. It's also a good idea to know your test results and keep a list of the medicines you take. How can you care for yourself at home? · Take pain medicines exactly as directed. ? If the doctor gave you a prescription medicine for pain, take it as prescribed. ? If you are not taking a prescription pain medicine, ask your doctor if you can take an over-the-counter medicine. ? Do not take two or more pain medicines at the same time unless the doctor told you to. Many pain medicines contain acetaminophen, which is Tylenol. Too much acetaminophen (Tylenol) can be harmful. · If your doctor recommends that you wear a sling, use it as directed. Do not take it off before your doctor tells you to. · Put ice or a cold pack on the sore area for 10 to 20 minutes at a time. Put a thin cloth between the ice and your skin. · If there is no swelling, you can put moist heat, a heating pad, or a warm cloth on your shoulder. Some doctors suggest alternating between hot and cold. · Rest your shoulder for a few days. If your doctor recommends it, you can then begin gentle exercise of the shoulder, but do not lift anything heavy. When should you call for help? Call 911 anytime you think you may need emergency care. For example, call if:    · You have chest pain or pressure. This may occur with:  ? Sweating. ? Shortness of breath. ? Nausea or vomiting. ? Pain that spreads from the chest to the neck, jaw, or one or both shoulders or arms. ? Dizziness or lightheadedness. ? A fast or uneven pulse. After calling 911, chew 1 adult-strength aspirin. Wait for an ambulance. Do not try to drive yourself.     · Your arm or hand is cool or pale or changes color.    Call your doctor now or seek immediate medical care if:    · You have signs of infection, such as:  ? Increased pain, swelling, warmth, or redness in your shoulder. ? Red streaks leading from a place on your shoulder. ? Pus draining from an area of your shoulder. ? Swollen lymph nodes in your neck, armpits, or groin. ? A fever. Watch closely for changes in your health, and be sure to contact your doctor if:    · You cannot use your shoulder.     · Your shoulder does not get better as expected. Where can you learn more? Go to http://www.norwood.com/  Enter H996 in the search box to learn more about \"Shoulder Pain: Care Instructions. \"  Current as of: July 1, 2021               Content Version: 13.2  © 2006-2022 [employer]. Care instructions adapted under license by [employer] (which disclaims liability or warranty for this information). If you have questions about a medical condition or this instruction, always ask your healthcare professional. [place] [address] any warranty or liability for your use of this information. Shoulder Stretches: Exercises  Introduction  Here are some examples of exercises for you to try. The exercises may be suggested for a condition or for rehabilitation. Start each exercise slowly. Ease off the exercises if you start to have pain. You will be told when to start these exercises and which ones will work best for you. How to do the exercises  Shoulder stretch    1.  a doorway and place one arm against the door frame. Your elbow should be a little higher than your shoulder. 2. Relax your shoulders as you lean forward, allowing your chest and shoulder muscles to stretch. You can also turn your body slightly away from your arm to stretch the muscles even more. 3. Hold for 15 to 30 seconds. 4. Repeat 2 to 4 times with each arm. Shoulder and chest stretch    1.  Shoulder and chest stretch  2. While sitting, relax your upper body so you slump slightly in your chair. 3. As you breathe in, straighten your back and open your arms out to the sides. 4. Gently pull your shoulder blades back and downward. 5. Hold for 15 to 30 seconds as your breathe normally. 6. Repeat 2 to 4 times. Overhead stretch    1. Reach up over your head with both arms. 2. Hold for 15 to 30 seconds. 3. Repeat 2 to 4 times. Follow-up care is a key part of your treatment and safety. Be sure to make and go to all appointments, and call your doctor if you are having problems. It's also a good idea to know your test results and keep a list of the medicines you take. Where can you learn more? Go to http://www.gray.com/  Enter S254 in the search box to learn more about \"Shoulder Stretches: Exercises. \"  Current as of: July 1, 2021               Content Version: 13.2  © 2006-2022 Healthwise[employer]. Care instructions adapted under license by [employer] (which disclaims liability or warranty for this information). If you have questions about a medical condition or this instruction, always ask your healthcare professional. [address] any warranty or liability for your use of this information.

## 2022-05-03 NOTE — PROGRESS NOTES
General Office Visit Note        Assessment/Plan:     Diagnoses and all orders for this visit:    1. Acute pain of right shoulder  -     XR SHOULDER RT AP/LAT MIN 2 V; Future  -     methocarbamoL (ROBAXIN) 500 mg tablet; Take 1 Tablet by mouth four (4) times daily as needed for Muscle Spasm(s). Follow-up and Dispositions    · Return if symptoms worsen or fail to improve with PCP. Subjective:     Natividad Velarde is a 59 y.o. y.o. female who complains of   Chief Complaint   Patient presents with    Shoulder Pain     Shoulder Pain Review:    Patient complains of right side shoulder pain. The symptoms began problem is longstanding, this episode began 3 weeks ago Course of symptoms since onset has been gradually worsening. Pain is described as overall severity = moderate and severe, location: poorly localized and worse with overhead movements. Symptoms were incited by no known event. Patient denies N/A. Therapy to date includes OTC analgesics: not very effective. Pt states that the pain is intense and is constatnt for the past week. Pt states that pain is worse when she lifts her are. Right Shoulder- Empty Can Test: Positive. Cross-Chest Test: Positive.     Past Medical History:   Diagnosis Date    Allergic rhinitis 7/16/2010    Asthma     Chronic shoulder pain 7/16/2010    HTN (hypertension) 7/16/2010    Hyperlipemia 8/97/6671    Lichen planus 7/5/4318    Obesity 7/16/2010     Past Surgical History:   Procedure Laterality Date    HX HYSTERECTOMY  5/16/14    HX HYSTERECTOMY       Social History     Socioeconomic History    Marital status:    Tobacco Use    Smoking status: Never Smoker    Smokeless tobacco: Never Used   Vaping Use    Vaping Use: Never used   Substance and Sexual Activity    Alcohol use: No    Drug use: Yes     Types: Marijuana    Sexual activity: Yes     Partners: Male     Birth control/protection: Surgical     Comment: btl     Current Outpatient Medications Medication Sig Dispense Refill    methocarbamoL (ROBAXIN) 500 mg tablet Take 1 Tablet by mouth four (4) times daily as needed for Muscle Spasm(s). 120 Tablet 0    fluticasone propion-salmeteroL (ADVAIR/WIXELA) 250-50 mcg/dose diskus inhaler Take 1 Puff by inhalation every twelve (12) hours. 3 Each 1    ondansetron (ZOFRAN ODT) 8 mg disintegrating tablet Take 1 Tablet by mouth every twelve (12) hours as needed for Nausea. 20 Tablet 0    acetaminophen (Tylenol Extra Strength) 500 mg tablet Take 1 Tablet by mouth every six (6) hours as needed for Pain (not to exceed 4 tabs in 24 hrs). (Patient taking differently: Take 500 mg by mouth every six (6) hours as needed for Pain (not to exceed 4 tabs in 24 hrs). Taking two tablets) 60 Tablet 0    polyethylene glycol (MIRALAX) 17 gram/dose powder Take 17 g by mouth daily. 1521 g 1    butalbital-acetaminophen-caffeine (FIORICET, ESGIC) -40 mg per tablet Take 1 Tablet by mouth every six (6) hours as needed (for pain or headache). Not to exceed 4 tabs in 24 hrs 40 Tablet 1    naproxen (NAPROSYN) 500 mg tablet Take 1 Tablet by mouth two (2) times daily as needed for Pain. 60 Tablet 1    montelukast (SINGULAIR) 10 mg tablet Take 1 Tablet by mouth nightly. 90 Tablet 1    albuterol (PROVENTIL VENTOLIN) 2.5 mg /3 mL (0.083 %) nebu 3 mL by Nebulization route every four (4) hours as needed for Wheezing or Shortness of Breath. 2 Each 3    ibuprofen (MOTRIN) 800 mg tablet Take 1 Tablet by mouth every eight (8) hours as needed for Pain. 60 Tablet 0    diphenhydrAMINE (Benadryl Allergy) 25 mg tablet Take 1 Tablet by mouth every six (6) hours as needed (allergies). 30 Tablet 3    rosuvastatin (CRESTOR) 5 mg tablet Take 1 Tablet by mouth nightly. 90 Tablet 1    amitriptyline (ELAVIL) 25 mg tablet Take 1 tab po QHS, may increase to 2 tabs po QHS      naratriptan (AMERGE) 2.5 mg tab Take 1 tab po at the onset of a migraine. Ma repeat once in 2 hours. Max 2 tabs/day.       erenumab-aooe (Aimovig Autoinjector) 140 mg/mL injection 140 mg by SubCUTAneous route.  albuterol (PROVENTIL HFA, VENTOLIN HFA, PROAIR HFA) 90 mcg/actuation inhaler Take 2 Puffs by inhalation every four (4) hours as needed for Wheezing or Shortness of Breath. 1 Inhaler 6    Nebulizer & Compressor machine 1 Each by Does Not Apply route every four (4) hours as needed. 1 Each 0    gabapentin (NEURONTIN) 100 mg capsule TAKE 1 CAPSULE BY MOUTH AT BEDTIME (Patient not taking: Reported on 12/16/2021)      oxyCODONE-acetaminophen (PERCOCET) 5-325 mg per tablet TAKE 1 TABLET BY MOUTH EVERY 8 HOURS AS NEEDED FOR 7 DAYS (Patient not taking: Reported on 12/16/2021)      cloNIDine (CATAPRES) 0.1 mg/24 hr ptwk 1 Patch by TransDERmal route every seven (7) days. (Patient not taking: Reported on 12/16/2021) 12 Patch 2    hydrocortisone-pramoxine (PROCTOFOAM HC) rectal foam Insert 1 Applicator into rectum two (2) times a day. (Patient not taking: Reported on 12/16/2021) 1 Can 0    Transparent Dressings (Tegaderm Frame Style) 2 3/8 X 2 3/4 \" bndg To use with clonidine patch weekly (Patient not taking: Reported on 12/16/2021) 20 Each 1    ospemifene (OSPHENA) 60 mg tab tablet Take 1 Tab by mouth daily. (Patient not taking: Reported on 12/16/2021) 90 Tab 3    loratadine (CLARITIN REDITABS) 10 mg dissolvable tablet Take 1 Tab by mouth daily. (Patient not taking: Reported on 12/16/2021) 30 Tab 2     Allergies   Allergen Reactions    Rice Anaphylaxis    Cherry Anaphylaxis    Asa-Acetaminophen-Caff-Potass Hives     Just allergic to aspirin only per Pt.      Gipsy Other (comments)     syncope    [place] Concentrate [Flavoring Agent] Nausea and Vomiting    Zyrtec [Cetirizine] Other (comments)     Dizziness, blurred vision and disoriented     The patient has a family history of    REVIEW OF SYSTEMS  ROS    Objective:     Visit Vitals  /88 (BP 1 Location: Left upper arm, BP Patient Position: Sitting, BP Cuff Size: Large adult)   Pulse 83   Temp 97 °F (36.1 °C) (Temporal)   Resp 15   Ht 5' 1\" (1.549 m)   Wt 183 lb (83 kg)   LMP 05/16/2014   SpO2 98%   BMI 34.58 kg/m²       Current Outpatient Medications   Medication Instructions    acetaminophen (TYLENOL EXTRA STRENGTH) 500 mg, Oral, EVERY 6 HOURS AS NEEDED    Aimovig Autoinjector 140 mg, SubCUTAneous    albuterol (PROVENTIL HFA, VENTOLIN HFA, PROAIR HFA) 90 mcg/actuation inhaler 2 Puffs, Inhalation, EVERY 4 HOURS AS NEEDED    albuterol (PROVENTIL VENTOLIN) 2.5 mg, Nebulization, EVERY 4 HOURS AS NEEDED    amitriptyline (ELAVIL) 25 mg tablet Take 1 tab po QHS, may increase to 2 tabs po QHS    butalbital-acetaminophen-caffeine (FIORICET, ESGIC) -40 mg per tablet 1 Tablet, Oral, EVERY 6 HOURS AS NEEDED, Not to exceed 4 tabs in 24 hrs    cloNIDine (CATAPRES) 0.1 mg/24 hr ptwk 1 Patch, TransDERmal, EVERY 7 DAYS    diphenhydrAMINE (BENADRYL ALLERGY) 25 mg, Oral, EVERY 6 HOURS AS NEEDED    fluticasone propion-salmeteroL (ADVAIR/WIXELA) 250-50 mcg/dose diskus inhaler 1 Puff, Inhalation, EVERY 12 HOURS    gabapentin (NEURONTIN) 100 mg capsule TAKE 1 CAPSULE BY MOUTH AT BEDTIME    hydrocortisone-pramoxine (PROCTOFOAM HC) rectal foam 1 Applicator, Rectal, 2 TIMES DAILY    ibuprofen (MOTRIN) 800 mg, Oral, EVERY 8 HOURS AS NEEDED    loratadine (CLARITIN REDITABS) 10 mg, Oral, DAILY    methocarbamoL (ROBAXIN) 500 mg, Oral, 4 TIMES DAILY AS NEEDED    montelukast (SINGULAIR) 10 mg, Oral, EVERY BEDTIME    naproxen (NAPROSYN) 500 mg, Oral, 2 TIMES DAILY AS NEEDED    naratriptan (AMERGE) 2.5 mg tab Take 1 tab po at the onset of a migraine. Ma repeat once in 2 hours. Max 2 tabs/day.     Nebulizer & Compressor machine 1 Each, Does Not Apply, EVERY 4 HOURS AS NEEDED    ondansetron (ZOFRAN ODT) 8 mg, Oral, EVERY 12 HOURS AS NEEDED    ospemifene (OSPHENA) 60 mg, Oral, DAILY    oxyCODONE-acetaminophen (PERCOCET) 5-325 mg per tablet TAKE 1 TABLET BY MOUTH EVERY 8 HOURS AS NEEDED FOR 7 DAYS    polyethylene glycol (MIRALAX) 17 g, Oral, DAILY    rosuvastatin (CRESTOR) 5 mg, Oral, EVERY BEDTIME    Transparent Dressings (Tegaderm Frame Style) 2 3/8 X 2 3/4 \" bndg To use with clonidine patch weekly        PHYSICAL EXAM  Physical Exam  Vitals and nursing note reviewed. Constitutional:       Appearance: Normal appearance. Cardiovascular:      Rate and Rhythm: Normal rate and regular rhythm. Pulses: Normal pulses. Heart sounds: Normal heart sounds. Pulmonary:      Effort: Pulmonary effort is normal.      Breath sounds: Normal breath sounds. Musculoskeletal:      Right shoulder: Tenderness present. Decreased range of motion. Decreased strength. Cervical back: Normal range of motion and neck supple. Skin:     General: Skin is warm and dry. Neurological:      Mental Status: She is alert and oriented to person, place, and time. Psychiatric:         Mood and Affect: Mood normal.         Behavior: Behavior normal.         Disclaimer:    I have discussed the diagnosis with the patient and the intended plan as seen above. The patient understands our medical plan. The risks, benefits and significant side effects of all medications have been reviewed. Anticipated time course and progression of condition reviewed. All questions have been addressed. She received an after visit summary, with information reviewed, and questions answered. Where appropriate, she is instructed to call the clinic if she has not been notified either by phone or through [address] with the results of her tests or with an appointment plan for any referrals within 1 week(s). The patient  is to call if her condition worsens or fails to improve or if significant side effects are experienced.        Shu Berry NP     5/3/2022

## 2022-05-06 RX ORDER — ACETAMINOPHEN 500 MG
500 TABLET ORAL
Qty: 60 TABLET | Refills: 0 | Status: SHIPPED | OUTPATIENT
Start: 2022-05-06 | End: 2022-06-28 | Stop reason: SDUPTHER

## 2022-05-06 RX ORDER — ROSUVASTATIN CALCIUM 5 MG/1
5 TABLET, COATED ORAL
Qty: 90 TABLET | Refills: 1 | Status: SHIPPED | OUTPATIENT
Start: 2022-05-06 | End: 2022-10-28 | Stop reason: SDUPTHER

## 2022-05-09 ENCOUNTER — HOSPITAL ENCOUNTER (OUTPATIENT)
Dept: MAMMOGRAPHY | Age: 57
Discharge: HOME | End: 2022-05-09
Attending: NURSE PRACTITIONER
Payer: OTHER GOVERNMENT

## 2022-05-09 DIAGNOSIS — Z12.31 SCREENING MAMMOGRAM FOR HIGH-RISK PATIENT: ICD-10-CM

## 2022-05-09 PROCEDURE — 77063 BREAST TOMOSYNTHESIS BI: CPT

## 2022-05-12 NOTE — PROGRESS NOTES
Please let her know that she has bone spurs, and a possible cyst, as well as rotator cuff disease which needs to be managed by an orthopedic provider. A referral has been placed for her to see a provider specializing in orthopedics or bone.

## 2022-06-28 NOTE — TELEPHONE ENCOUNTER
Last Visit: 12/16/21 with NP Mary Velarde  Next Appointment: Advised to follow-up in 6 months  Previous Refill Encounter(s): 10/12/21 Motrin #60 & Benadryl #30 with 3 refills, 1/10/22 Miralax #1521 with 1 refill & Naproxen #60 with 1 refill, 1/28/22 Zofran #20, 5/6/22 Tylenol #60    Requested Prescriptions     Pending Prescriptions Disp Refills    ibuprofen (MOTRIN) 800 mg tablet 60 Tablet 0     Sig: Take 1 Tablet by mouth every eight (8) hours as needed for Pain.  diphenhydrAMINE (Benadryl Allergy) 25 mg tablet 30 Tablet 3     Sig: Take 1 Tablet by mouth every six (6) hours as needed (allergies).  polyethylene glycol (MIRALAX) 17 gram/dose powder 1521 g 1     Sig: Take 17 g by mouth daily.  naproxen (NAPROSYN) 500 mg tablet 60 Tablet 1     Sig: Take 1 Tablet by mouth two (2) times daily as needed for Pain.  ondansetron (ZOFRAN ODT) 8 mg disintegrating tablet 20 Tablet 0     Sig: Take 1 Tablet by mouth every twelve (12) hours as needed for Nausea.  acetaminophen (Tylenol Extra Strength) 500 mg tablet 60 Tablet 0     Sig: Take 1 Tablet by mouth every six (6) hours as needed for Pain (not to exceed 4 tabs in 24 hrs).          For [facility] in place:    Recommendation Provided To:    Intervention Detail: New Rx: 6, reason: Patient Preference and Scheduled Appointment   Gap Closed?:    Intervention Accepted By:   [facility] Time Spent (min): 10

## 2022-07-06 RX ORDER — ACETAMINOPHEN 500 MG
500 TABLET ORAL
Qty: 60 TABLET | Refills: 0 | Status: SHIPPED | OUTPATIENT
Start: 2022-07-06 | End: 2022-10-28 | Stop reason: SDUPTHER

## 2022-07-06 RX ORDER — NAPROXEN 500 MG/1
500 TABLET ORAL
Qty: 60 TABLET | Refills: 1 | Status: SHIPPED | OUTPATIENT
Start: 2022-07-06

## 2022-07-06 RX ORDER — POLYETHYLENE GLYCOL 3350 17 G/17G
17 POWDER, FOR SOLUTION ORAL DAILY
Qty: 1521 G | Refills: 1 | Status: SHIPPED | OUTPATIENT
Start: 2022-07-06

## 2022-07-06 RX ORDER — ONDANSETRON 8 MG/1
8 TABLET, ORALLY DISINTEGRATING ORAL
Qty: 20 TABLET | Refills: 0 | Status: SHIPPED | OUTPATIENT
Start: 2022-07-06 | End: 2022-10-28 | Stop reason: SDUPTHER

## 2022-07-06 RX ORDER — DIPHENHYDRAMINE HCL 25 MG
25 TABLET ORAL
Qty: 30 TABLET | Refills: 3 | Status: SHIPPED | OUTPATIENT
Start: 2022-07-06 | End: 2022-10-28 | Stop reason: SDUPTHER

## 2022-07-06 RX ORDER — IBUPROFEN 800 MG/1
800 TABLET ORAL
Qty: 60 TABLET | Refills: 0 | OUTPATIENT
Start: 2022-07-06

## 2022-07-07 ENCOUNTER — OFFICE VISIT (OUTPATIENT)
Dept: ORTHOPEDIC SURGERY | Age: 57
End: 2022-07-07
Payer: OTHER GOVERNMENT

## 2022-07-07 VITALS — TEMPERATURE: 97.5 F | HEIGHT: 61 IN | BODY MASS INDEX: 33.99 KG/M2 | WEIGHT: 180 LBS

## 2022-07-07 DIAGNOSIS — M75.81 ROTATOR CUFF TENDONITIS, RIGHT: ICD-10-CM

## 2022-07-07 DIAGNOSIS — I10 ESSENTIAL HYPERTENSION: ICD-10-CM

## 2022-07-07 DIAGNOSIS — G89.29 CHRONIC RIGHT SHOULDER PAIN: Primary | ICD-10-CM

## 2022-07-07 DIAGNOSIS — E78.2 MIXED HYPERLIPIDEMIA: Primary | ICD-10-CM

## 2022-07-07 DIAGNOSIS — R73.09 ELEVATED HEMOGLOBIN A1C: ICD-10-CM

## 2022-07-07 DIAGNOSIS — M25.511 CHRONIC RIGHT SHOULDER PAIN: Primary | ICD-10-CM

## 2022-07-07 PROCEDURE — 99203 OFFICE O/P NEW LOW 30 MIN: CPT | Performed by: ORTHOPAEDIC SURGERY

## 2022-07-07 PROCEDURE — 20611 DRAIN/INJ JOINT/BURSA W/US: CPT | Performed by: ORTHOPAEDIC SURGERY

## 2022-07-07 RX ORDER — TRIAMCINOLONE ACETONIDE 40 MG/ML
40 INJECTION, SUSPENSION INTRA-ARTICULAR; INTRAMUSCULAR ONCE
Status: COMPLETED | OUTPATIENT
Start: 2022-07-07 | End: 2022-07-07

## 2022-07-07 RX ORDER — BUPIVACAINE HYDROCHLORIDE 5 MG/ML
4 INJECTION, SOLUTION EPIDURAL; INTRACAUDAL ONCE
Status: DISCONTINUED | OUTPATIENT
Start: 2022-07-07 | End: 2022-07-07

## 2022-07-07 RX ADMIN — TRIAMCINOLONE ACETONIDE 40 MG: 40 INJECTION, SUSPENSION INTRA-ARTICULAR; INTRAMUSCULAR at 11:14

## 2022-07-07 NOTE — PROGRESS NOTES
Chaim Rivera  1965   Chief Complaint   Patient presents with    Shoulder Pain     rt        HISTORY OF PRESENT ILLNESS  Chaim Rivera is a 59 y.o. female who presents today for evaluation of right shoulder pain. She rates her pain 9/10 today. She complains of stabbing pain in the right shoulder \"in the soft tissue parts\" that disrupts her sleep. She notes superior shoulder pain with abduction of the right shoulder, posterior shoulder pain with retraction, and constitutive anterior shoulder pain. Pain has been present for a while, she states. She mentions prior injections for trigger finger with relief. Has tried following treatments: Injections:NO; Brace:NO; Therapy:NO; Cane/Crutch:NO       Allergies   Allergen Reactions    Riva Anaphylaxis    Cherry Anaphylaxis    Methocarbamol Nausea and Vomiting    Asa-Acetaminophen-Caff-Potass Hives     Just allergic to aspirin only per Pt.      [place] Other (comments)     syncope    [place] Concentrate [Flavoring Agent] Nausea and Vomiting    Zyrtec [Cetirizine] Other (comments)     Dizziness, blurred vision and disoriented        Past Medical History:   Diagnosis Date    Allergic rhinitis 7/16/2010    Asthma     Chronic shoulder pain 7/16/2010    HTN (hypertension) 7/16/2010    Hyperlipemia 7/65/9356    Lichen planus 6/4/0507    Obesity 7/16/2010      Social History     Socioeconomic History    Marital status:      Spouse name: Not on file    Number of children: Not on file    Years of education: Not on file    Highest education level: Not on file   Occupational History    Not on file   Tobacco Use    Smoking status: Never Smoker    Smokeless tobacco: Never Used   Vaping Use    Vaping Use: Never used   Substance and Sexual Activity    Alcohol use: No    Drug use: Yes     Types: Marijuana    Sexual activity: Yes     Partners: Male     Birth control/protection: Surgical     Comment: btl   Other Topics Concern  Not on file   Social History Narrative    Not on file     Social Determinants of Health     Financial Resource Strain:     Difficulty of Paying Living Expenses: Not on file   Food Insecurity:     Worried About Running Out of Food in the Last Year: Not on file    Gamal of Food in the Last Year: Not on file   Transportation Needs:     Lack of Transportation (Medical): Not on file    Lack of Transportation (Non-Medical): Not on file   Physical Activity:     Days of Exercise per Week: Not on file    Minutes of Exercise per Session: Not on file   Stress:     Feeling of Stress : Not on file   Social Connections:     Frequency of Communication with Friends and Family: Not on file    Frequency of Social Gatherings with Friends and Family: Not on file    Attends [employer]ism Services: Not on file    Active Member of [address] [place] [address] [employer] or Organizations: Not on file    Attends Club or Organization Meetings: Not on file    Marital Status: Not on file   Intimate Partner Violence:     Fear of Current or Ex-Partner: Not on file    Emotionally Abused: Not on file    Physically Abused: Not on file    Sexually Abused: Not on file   Housing Stability:     Unable to Pay for Housing in the Last Year: Not on file    Number of [place] in the Last Year: Not on file    Unstable Housing in the Last Year: Not on file      Past Surgical History:   Procedure Laterality Date    HX HYSTERECTOMY  5/16/14    HX HYSTERECTOMY        Family History   Problem Relation Age of Onset    Hypertension Mother     Diabetes Mother     Hypertension Sister     Diabetes Maternal Grandmother     Cancer Maternal Aunt         breast at age 54    Cancer Other         lung ca in great grand ma.  No Known Problems Father       Current Outpatient Medications   Medication Sig    diphenhydrAMINE (Benadryl Allergy) 25 mg tablet Take 1 Tablet by mouth every six (6) hours as needed (allergies).     polyethylene glycol (MIRALAX) 17 gram/dose powder Take 17 g by mouth daily.  naproxen (NAPROSYN) 500 mg tablet Take 1 Tablet by mouth two (2) times daily as needed for Pain.  ondansetron (ZOFRAN ODT) 8 mg disintegrating tablet Take 1 Tablet by mouth every twelve (12) hours as needed for Nausea.  hydrocortisone-pramoxine (PROCTOFOAM HC) rectal foam Insert 1 Applicator into rectum two (2) times a day.  rosuvastatin (CRESTOR) 5 mg tablet Take 1 Tablet by mouth nightly.  fluticasone propion-salmeteroL (ADVAIR/WIXELA) 250-50 mcg/dose diskus inhaler Take 1 Puff by inhalation every twelve (12) hours.  butalbital-acetaminophen-caffeine (FIORICET, ESGIC) -40 mg per tablet Take 1 Tablet by mouth every six (6) hours as needed (for pain or headache). Not to exceed 4 tabs in 24 hrs    montelukast (SINGULAIR) 10 mg tablet Take 1 Tablet by mouth nightly.  ibuprofen (MOTRIN) 800 mg tablet Take 1 Tablet by mouth every eight (8) hours as needed for Pain.  amitriptyline (ELAVIL) 25 mg tablet Take 1 tab po QHS, may increase to 2 tabs po QHS    naratriptan (AMERGE) 2.5 mg tab Take 1 tab po at the onset of a migraine. Ma repeat once in 2 hours. Max 2 tabs/day.  erenumab-aooe (Aimovig Autoinjector) 140 mg/mL injection 140 mg by SubCUTAneous route.  Nebulizer & Compressor machine 1 Each by Does Not Apply route every four (4) hours as needed.  acetaminophen (Tylenol Extra Strength) 500 mg tablet Take 1 Tablet by mouth every six (6) hours as needed for Pain (not to exceed 4 tabs in 24 hrs).  methocarbamoL (ROBAXIN) 500 mg tablet Take 1 Tablet by mouth four (4) times daily as needed for Muscle Spasm(s). (Patient not taking: Reported on 7/7/2022)    albuterol (PROVENTIL VENTOLIN) 2.5 mg /3 mL (0.083 %) nebu 3 mL by Nebulization route every four (4) hours as needed for Wheezing or Shortness of Breath.     gabapentin (NEURONTIN) 100 mg capsule TAKE 1 CAPSULE BY MOUTH AT BEDTIME (Patient not taking: Reported on 12/16/2021)    oxyCODONE-acetaminophen (PERCOCET) 5-325 mg per tablet TAKE 1 TABLET BY MOUTH EVERY 8 HOURS AS NEEDED FOR 7 DAYS (Patient not taking: Reported on 12/16/2021)    cloNIDine (CATAPRES) 0.1 mg/24 hr ptwk 1 Patch by TransDERmal route every seven (7) days. (Patient not taking: Reported on 12/16/2021)    albuterol (PROVENTIL HFA, VENTOLIN HFA, PROAIR HFA) 90 mcg/actuation inhaler Take 2 Puffs by inhalation every four (4) hours as needed for Wheezing or Shortness of Breath.  Transparent Dressings (Tegaderm Frame Style) 2 3/8 X 2 3/4 \" bndg To use with clonidine patch weekly (Patient not taking: Reported on 12/16/2021)    ospemifene (OSPHENA) 60 mg tab tablet Take 1 Tab by mouth daily. (Patient not taking: Reported on 12/16/2021)    loratadine (CLARITIN REDITABS) 10 mg dissolvable tablet Take 1 Tab by mouth daily. (Patient not taking: Reported on 12/16/2021)     No current facility-administered medications for this visit. REVIEW OF SYSTEM   Patient denies: Weight loss, Fever/Chills, HA, Visual changes, Fatigue, Chest pain, SOB, Abdominal pain, N/V/D/C, Blood in stool or urine, Edema. Pertinent positive as above in HPI. All others were negative    PHYSICAL EXAM:   Visit Vitals  Temp 97.5 °F (36.4 °C) (Temporal)   Ht 5' 1\" (1.549 m)   Wt 180 lb (81.6 kg)   LMP 05/16/2014   BMI 34.01 kg/m²     The patient is a well-developed, well-nourished female   in no acute distress. The patient is alert and oriented times three. The patient is alert and oriented times three. Mood and affect are normal.  LYMPHATIC: lymph nodes are not enlarged and are within normal limits  SKIN: normal in color and non tender to palpation. There are no bruises or abrasions noted. NEUROLOGICAL: Motor sensory exam is within normal limits. Reflexes are equal bilaterally.  There is normal sensation to pinprick and light touch  MUSCULOSKELETAL:    Examination Right shoulder   Skin Intact   AC joint tenderness -   Biceps tenderness -   Forward flexion/Elevation    Active abduction    Glenohumeral abduction 80   External rotation ROM 45   Internal rotation ROM 30   Apprehension -   Thomass Relocation -   Jerk -   Load and Shift -   Obriens -   Speeds -   Impingement sign +   Supraspinatus/Empty Can +   External Rotation Strength -, 5/5   Lift Off/Belly Press -, 5/5   Neurovascular Intact           PROCEDURE:     After sterile prep, 4 cc of Xylocaine and 1 cc of Kenalog were injected into the right shoulder . [facility]  OFFICE PROCEDURE PROGRESS NOTE        Chart reviewed for the following:  Familia Crawford MD, have reviewed the History, Physical and updated the Allergic reactions for [address] [place] [address] performed immediately prior to start of procedure:  Familia Crawford MD, have performed the following reviews on [place] [address] prior to the start of the procedure:            * Patient was identified by name and date of birth   * Agreement on procedure being performed was verified  * Risks and Benefits explained to the patient  * Procedure site verified and marked as necessary  * Patient was positioned for comfort  * Consent was signed and verified     Time: 10:55 AM    Date of procedure: 7/7/2022    Procedure performed by:  Musa Harman MD    Provider assisted by: (see medication administration)    How tolerated by patient: tolerated the procedure well with no complications    Comments: none      IMPRESSION:      ICD-10-CM ICD-9-CM    1. Chronic right shoulder pain  M25.511 719.41 ARTHROCENTESIS ASPIR&/INJ MAJOR JT/BURSA W/US    G89.29 338.29 triamcinolone acetonide (KENALOG-40) 40 mg/mL injection 40 mg      DISCONTINUED: bupivacaine (PF) (MARCAINE) 0.5 % (5 mg/mL) injection 20 mg   2. Rotator cuff tendonitis, right  M75.81 726.10         PLAN:  1. This time we will proceed with a cortisone injection in the right shoulder.   If no significant improvement will consider an MRI  Risk factors include: BMI > 30,   2. Yes ultrasound exam indicated today  3. Yes cortisone injection indicated today   4. No Physical/Occupational Therapy indicated today  5. No diagnostic test indicated today:   6. No durable medical equipment indicated today  7. No referral indicated today   8. No medications indicated today:   9. No Narcotic indicated today. RTC 3 weeks      Scribed by Solange Grover) as dictated by MD PORFIRIO Dixon, Dr. Melba Mukherjee, confirm that all documentation is accurate.     Melba Mukherjee M.D.   [facility]

## 2022-07-07 NOTE — PATIENT INSTRUCTIONS
Shoulder Arthritis: Exercises  Introduction  Here are some examples of exercises for you to try. The exercises may be suggested for a condition or for rehabilitation. Start each exercise slowly. Ease off the exercises if you start to have pain. You will be told when to start these exercises and which ones will work best for you. How to do the exercises  Shoulder flexion (lying down)    To make a wand for this exercise, use a piece of PVC pipe or a broom handle with the broom removed. Make the wand about a foot wider than your shoulders. 1. Lie on your back, holding a wand with both hands. Your palms should face down as you hold the wand. 2. Keeping your elbows straight, slowly raise your arms over your head. Raise them until you feel a stretch in your shoulders, upper back, and chest.  3. Hold for 15 to 30 seconds. 4. Repeat 2 to 4 times. Shoulder rotation (lying down)    To make a wand for this exercise, use a piece of PVC pipe or a broom handle with the broom removed. Make the wand about a foot wider than your shoulders. 1. Lie on your back. Hold a wand with both hands with your elbows bent and palms up. 2. Keep your elbows close to your body, and move the wand across your body toward the sore arm. 3. Hold for 8 to 12 seconds. 4. Repeat 2 to 4 times. Shoulder internal rotation with towel    1. Hold a towel above and behind your head with the arm that is not sore. 2. With your sore arm, reach behind your back and grasp the towel. 3. With the arm above your head, pull the towel upward. Do this until you feel a stretch on the front and outside of your sore shoulder. 4. Hold 15 to 30 seconds. 5. Repeat 2 to 4 times. Shoulder blade squeeze    1. Stand with your arms at your sides, and squeeze your shoulder blades together. Do not raise your shoulders up as you squeeze. 2. Hold 6 seconds. 3. Repeat 8 to 12 times.   Resisted rows    For this exercise, you will need elastic exercise material, such as surgical tubing or Thera-Band. 1. Put the band around a solid object at about waist level. (A bedpost will work well.) Each hand should hold an end of the band. 2. With your elbows at your sides and bent to 90 degrees, pull the band back. Your shoulder blades should move toward each other. Return to the starting position. 3. Repeat 8 to 12 times. External rotator strengthening exercise    1. Start by tying a piece of elastic exercise material to a doorknob. You can use surgical tubing or Thera-Band. (You may also hold one end of the band in each hand.)  2. Stand or sit with your shoulder relaxed and your elbow bent 90 degrees. Your upper arm should rest comfortably against your side. Squeeze a rolled towel between your elbow and your body for comfort. This will help keep your arm at your side. 3. Hold one end of the elastic band with the hand of the painful arm. 4. Start with your forearm across your belly. Slowly rotate the forearm out away from your body. Keep your elbow and upper arm tucked against the towel roll or the side of your body until you begin to feel tightness in your shoulder. Slowly move your arm back to where you started. 5. Repeat 8 to 12 times. Internal rotator strengthening exercise    1. Start by tying a piece of elastic exercise material to a doorknob. You can use surgical tubing or Thera-Band. 2. Stand or sit with your shoulder relaxed and your elbow bent 90 degrees. Your upper arm should rest comfortably against your side. Squeeze a rolled towel between your elbow and your body for comfort. This will help keep your arm at your side. 3. Hold one end of the elastic band in the hand of the painful arm. 4. Slowly rotate your forearm toward your body until it touches your belly. Slowly move it back to where you started. 5. Keep your elbow and upper arm firmly tucked against the towel roll or at your side. 6. Repeat 8 to 12 times.   Pendulum swing    If you have pain in your back, do not do this exercise. 1. Hold on to a table or the back of a chair with your good arm. Then bend forward a little and let your sore arm hang straight down. This exercise does not use the arm muscles. Rather, use your legs and your hips to create movement that makes your arm swing freely. 2. Use the movement from your hips and legs to guide the slightly swinging arm back and forth like a pendulum (or elephant trunk). Then guide it in circles that start small (about the size of a dinner plate). Make the circles a bit larger each day, as your pain allows. 3. Do this exercise for 5 minutes, 5 to 7 times each day. 4. As you have less pain, try bending over a little farther to do this exercise. This will increase the amount of movement at your shoulder. Follow-up care is a key part of your treatment and safety. Be sure to make and go to all appointments, and call your doctor if you are having problems. It's also a good idea to know your test results and keep a list of the medicines you take. Where can you learn more? Go to http://www.gray.com/  Enter H562 in the search box to learn more about \"Shoulder Arthritis: Exercises. \"  Current as of: July 1, 2021               Content Version: 13.2  © 2006-2022 [employer]. Care instructions adapted under license by [employer] (which disclaims liability or warranty for this information). If you have questions about a medical condition or this instruction, always ask your healthcare professional. [place] [address] any warranty or liability for your use of this information.

## 2022-07-08 ENCOUNTER — APPOINTMENT (OUTPATIENT)
Dept: FAMILY MEDICINE CLINIC | Age: 57
End: 2022-07-08

## 2022-07-08 ENCOUNTER — HOSPITAL ENCOUNTER (OUTPATIENT)
Dept: LAB | Age: 57
Discharge: HOME | End: 2022-07-08
Payer: OTHER GOVERNMENT

## 2022-07-08 DIAGNOSIS — I10 ESSENTIAL HYPERTENSION: ICD-10-CM

## 2022-07-08 DIAGNOSIS — E78.2 MIXED HYPERLIPIDEMIA: ICD-10-CM

## 2022-07-08 DIAGNOSIS — R73.09 ELEVATED HEMOGLOBIN A1C: ICD-10-CM

## 2022-07-08 LAB
ALBUMIN SERPL-MCNC: 4 G/DL (ref 3.4–5)
ALBUMIN/GLOB SERPL: 1.4 {RATIO} (ref 0.8–1.7)
ALP SERPL-CCNC: 108 U/L (ref 45–117)
ALT SERPL-CCNC: 15 U/L (ref 13–56)
ANION GAP SERPL CALC-SCNC: 6 MMOL/L (ref 3–18)
AST SERPL-CCNC: 16 U/L (ref 10–38)
BILIRUB SERPL-MCNC: 0.5 MG/DL (ref 0.2–1)
BUN SERPL-MCNC: 15 MG/DL (ref 7–18)
BUN/CREAT SERPL: 21 (ref 12–20)
CALCIUM SERPL-MCNC: 9.3 MG/DL (ref 8.5–10.1)
CHLORIDE SERPL-SCNC: 108 MMOL/L (ref 100–111)
CHOLEST SERPL-MCNC: 169 MG/DL
CO2 SERPL-SCNC: 26 MMOL/L (ref 21–32)
CREAT SERPL-MCNC: 0.71 MG/DL (ref 0.6–1.3)
ERYTHROCYTE [DISTWIDTH] IN BLOOD BY AUTOMATED COUNT: 14.2 % (ref 11.6–14.5)
EST. AVERAGE GLUCOSE BLD GHB EST-MCNC: 123 MG/DL
GLOBULIN SER CALC-MCNC: 2.9 G/DL (ref 2–4)
GLUCOSE SERPL-MCNC: 90 MG/DL (ref 74–99)
HBA1C MFR BLD: 5.9 % (ref 4.2–5.6)
HCT VFR BLD AUTO: 35.4 % (ref 35–45)
HDLC SERPL-MCNC: 60 MG/DL (ref 40–60)
HDLC SERPL: 2.8 {RATIO} (ref 0–5)
HGB BLD-MCNC: 10.8 G/DL (ref 12–16)
LDLC SERPL CALC-MCNC: 96 MG/DL (ref 0–100)
LIPID PROFILE,FLP: NORMAL
MCH RBC QN AUTO: 25.4 PG (ref 24–34)
MCHC RBC AUTO-ENTMCNC: 30.5 G/DL (ref 31–37)
MCV RBC AUTO: 83.3 FL (ref 78–100)
NRBC # BLD: 0 K/UL (ref 0–0.01)
NRBC BLD-RTO: 0 PER 100 WBC
PLATELET # BLD AUTO: 262 K/UL (ref 135–420)
PMV BLD AUTO: 10 FL (ref 9.2–11.8)
POTASSIUM SERPL-SCNC: 4.5 MMOL/L (ref 3.5–5.5)
PROT SERPL-MCNC: 6.9 G/DL (ref 6.4–8.2)
RBC # BLD AUTO: 4.25 M/UL (ref 4.2–5.3)
SODIUM SERPL-SCNC: 140 MMOL/L (ref 136–145)
TRIGL SERPL-MCNC: 65 MG/DL (ref ?–150)
VLDLC SERPL CALC-MCNC: 13 MG/DL
WBC # BLD AUTO: 8.6 K/UL (ref 4.6–13.2)

## 2022-07-08 PROCEDURE — 36415 COLL VENOUS BLD VENIPUNCTURE: CPT

## 2022-07-08 PROCEDURE — 85027 COMPLETE CBC AUTOMATED: CPT

## 2022-07-08 PROCEDURE — 80061 LIPID PANEL: CPT

## 2022-07-08 PROCEDURE — 83036 HEMOGLOBIN GLYCOSYLATED A1C: CPT

## 2022-07-08 PROCEDURE — 80053 COMPREHEN METABOLIC PANEL: CPT

## 2022-08-03 ENCOUNTER — OFFICE VISIT (OUTPATIENT)
Dept: ORTHOPEDIC SURGERY | Age: 57
End: 2022-08-03
Payer: OTHER GOVERNMENT

## 2022-08-03 VITALS — WEIGHT: 180 LBS | TEMPERATURE: 98 F | HEIGHT: 61 IN | BODY MASS INDEX: 33.99 KG/M2

## 2022-08-03 DIAGNOSIS — M75.101 TEAR OF RIGHT ROTATOR CUFF, UNSPECIFIED TEAR EXTENT, UNSPECIFIED WHETHER TRAUMATIC: Primary | ICD-10-CM

## 2022-08-03 PROCEDURE — 99213 OFFICE O/P EST LOW 20 MIN: CPT | Performed by: PHYSICIAN ASSISTANT

## 2022-08-03 NOTE — PROGRESS NOTES
Lindsey Moya  1965   Chief Complaint   Patient presents with    Shoulder Pain     Rt         HISTORY OF PRESENT ILLNESS  Lindsey Moya is a 59 y.o. female who presents today for reevaluation of right shoulder. Patient rates pain as 10/10 today. She was last seen in the office by Dr. Miguel Angel Morgan on 7/07/2022 at which time she received a right shoulder cortisone injection. States the injection wore off around 1 week ago. She reports returning discomfort in the same area of the shoulder. No new injuries since last OV. Endorses night pain. Has been taking Naproxen and Tylenol. Has tried using Voltaren gel previously without significant relief. Pain has been present for a while, she states. Patient denies any fever, chills, chest pain, shortness of breath or calf pain. The remainder of the review of systems in negative. There are no new illness or injuries to report since last seen in the office. There are no changes to medications, allergies, family or social history. PHYSICAL EXAM:   Visit Vitals  Temp 98 °F (36.7 °C) (Temporal)   Ht 5' 1\" (1.549 m)   Wt 180 lb (81.6 kg)   LMP 05/16/2014   BMI 34.01 kg/m²     The patient is a well-developed, well-nourished female   in no acute distress. The patient is alert and oriented times three. The patient is alert and oriented times three. Mood and affect are normal.  LYMPHATIC: lymph nodes are not enlarged and are within normal limits  SKIN: normal in color and non tender to palpation. There are no bruises or abrasions noted. NEUROLOGICAL: Motor sensory exam is within normal limits. Reflexes are equal bilaterally.  There is normal sensation to pinprick and light touch  MUSCULOSKELETAL:  Examination Right shoulder   Skin Intact   AC joint tenderness -   Biceps tenderness -   Forward flexion/Elevation    Active abduction    Glenohumeral abduction 80   External rotation ROM 45   Internal rotation ROM 30   Apprehension - Thomass Relocation -   Jerk -   Load and Shift -   Obriens -   Speeds -   Impingement sign +   Supraspinatus/Empty Can + 4/5   External Rotation Strength -, 5/5   Lift Off/Belly Press -, 5/5   Neurovascular Intact         IMAGING: XR of the right shoulder with 3 views obtained at [facility] dated 5/03/2022 was reviewed and read by myself reveals:   IMPRESSION:  1. No radiographic evidence of acute fracture. 2.  Mild degenerative changes about the glenohumeral and acromioclavicular joint as described. 3.  Findings which can be associated with rotator cuff disease. IMPRESSION:      ICD-10-CM ICD-9-CM    1. Tear of right rotator cuff, unspecified tear extent, unspecified whether traumatic  M75.101 840.4 MRI SHOULDER RT WO CONT           PLAN:   1. Pt presents today with right shoulder pain and the cortisone injection given at last OV did not provide lasting relief. Will be ordering right shoulder MRI to r/o RCT. She can continue with taking Naproxen and Tylenol for discomfort. Risk factors include: htn, BMI>30  2. No ultrasound exam indicated today  3. No cortisone injection indicated today   4. No Physical/Occupational Therapy indicated today  5. Yes diagnostic test indicated today: MRI R SHOULDER  6. No durable medical equipment indicated today  7. No referral indicated today   8. No medications indicated today:   9.  No Narcotic indicated today      RTC following MRI with Dr. Myke Reed by Aaron Thomas) as dictated by NEYMAR Nugent [address] and Spine Specialist

## 2022-08-03 NOTE — TELEPHONE ENCOUNTER
Last Visit: 12/16/21 with NP Reyes Quan  Next Appointment: 9/1/22 with NP Reyes Quan  Previous Refill Encounter(s): 12/16/21 #90 with 1 refill    Requested Prescriptions     Pending Prescriptions Disp Refills    montelukast (SINGULAIR) 10 mg tablet 90 Tablet 1     Sig: Take 1 Tablet by mouth nightly. For Clint Leal in place:   Recommendation Provided To:    Intervention Detail: New Rx: 1, reason: Patient Preference  Gap Closed?:   Intervention Accepted By:   Time Spent (min): 5

## 2022-08-06 RX ORDER — MONTELUKAST SODIUM 10 MG/1
10 TABLET ORAL
Qty: 90 TABLET | Refills: 1 | Status: SHIPPED | OUTPATIENT
Start: 2022-08-06 | End: 2022-10-28 | Stop reason: SDUPTHER

## 2022-08-08 ENCOUNTER — TELEPHONE (OUTPATIENT)
Dept: ORTHOPEDIC SURGERY | Age: 57
End: 2022-08-08

## 2022-08-08 RX ORDER — MELOXICAM 7.5 MG/1
7.5 TABLET ORAL DAILY
Qty: 30 TABLET | Refills: 1 | Status: SHIPPED | OUTPATIENT
Start: 2022-08-08

## 2022-08-08 NOTE — TELEPHONE ENCOUNTER
Patient is requesting a new medication for shoulder pain. She has been scheduled for an MRI on 8/16 and reports she was seen at [facility] ED on 8/4 due to lack of pain medication. They provided her with Naproxen & Tylenol, however, she reports starting to have stomach issues and is requesting a new medication from us. Confirmed DOD pharmacy on file is correct. Please advise patient if we can prescribe something new for pain as she is down to 1 pill left from ED. She also says that there is no authorization request for the upcoming MRI and this will need to be submitted for [facility] to be billed for the service.

## 2022-08-16 ENCOUNTER — HOSPITAL ENCOUNTER (OUTPATIENT)
Age: 57
Discharge: HOME | End: 2022-08-16
Attending: PHYSICIAN ASSISTANT
Payer: OTHER GOVERNMENT

## 2022-08-16 DIAGNOSIS — M75.101 TEAR OF RIGHT ROTATOR CUFF, UNSPECIFIED TEAR EXTENT, UNSPECIFIED WHETHER TRAUMATIC: ICD-10-CM

## 2022-08-16 PROCEDURE — 73221 MRI JOINT UPR EXTREM W/O DYE: CPT

## 2022-08-19 ENCOUNTER — OFFICE VISIT (OUTPATIENT)
Dept: ORTHOPEDIC SURGERY | Age: 57
End: 2022-08-19
Payer: OTHER GOVERNMENT

## 2022-08-19 VITALS — TEMPERATURE: 97.8 F | WEIGHT: 173 LBS | HEIGHT: 61 IN | BODY MASS INDEX: 32.66 KG/M2

## 2022-08-19 DIAGNOSIS — M75.101 TEAR OF RIGHT ROTATOR CUFF, UNSPECIFIED TEAR EXTENT, UNSPECIFIED WHETHER TRAUMATIC: Primary | ICD-10-CM

## 2022-08-19 PROCEDURE — 99215 OFFICE O/P EST HI 40 MIN: CPT | Performed by: PHYSICIAN ASSISTANT

## 2022-08-19 RX ORDER — GABAPENTIN 300 MG/1
300 CAPSULE ORAL
Qty: 5 CAPSULE | Refills: 0 | Status: SHIPPED | OUTPATIENT
Start: 2022-08-19 | End: 2022-08-19 | Stop reason: ALTCHOICE

## 2022-08-19 RX ORDER — GABAPENTIN 300 MG/1
300 CAPSULE ORAL
Qty: 30 CAPSULE | Refills: 0 | Status: SHIPPED | OUTPATIENT
Start: 2022-08-19 | End: 2022-08-30 | Stop reason: SINTOL

## 2022-08-19 RX ORDER — OXYCODONE HYDROCHLORIDE 5 MG/1
5 TABLET ORAL
Qty: 40 TABLET | Refills: 0 | Status: SHIPPED | OUTPATIENT
Start: 2022-08-19 | End: 2022-08-26

## 2022-08-19 RX ORDER — TRAMADOL HYDROCHLORIDE 50 MG/1
50 TABLET ORAL
Qty: 28 TABLET | Refills: 0 | Status: SHIPPED | OUTPATIENT
Start: 2022-08-19 | End: 2022-08-26

## 2022-08-19 NOTE — PROGRESS NOTES
Lara Robles  1965   Chief Complaint   Patient presents with    Results     MRI results right shoulder        HISTORY OF PRESENT ILLNESS  Lara Robles is a 59 y.o. female who presents today for reevaluation of right shoulder. Patient rates pain as 10/10 today. She was last seen in the office by Dr. Demetria Alcazar on 7/07/2022 at which time she received a right shoulder cortisone injection with minimal relief. She reports returning discomfort in the same area of the shoulder. No new injuries since last OV. Endorses night pain. Has been taking Naproxen and Tylenol. Has tried using Voltaren gel previously without significant relief. Pain has been present for a while, she states. Patient denies any fever, chills, chest pain, shortness of breath or calf pain. The remainder of the review of systems in negative. There are no new illness or injuries to report since last seen in the office. There are no changes to medications, allergies, family or social history. PHYSICAL EXAM:   Visit Vitals  Temp 97.8 °F (36.6 °C)   Ht 5' 1\" (1.549 m)   Wt 173 lb (78.5 kg)   LMP 05/16/2014   BMI 32.69 kg/m²     The patient is a well-developed, well-nourished female   in no acute distress. The patient is alert and oriented times three. The patient is alert and oriented times three. Mood and affect are normal.  LYMPHATIC: lymph nodes are not enlarged and are within normal limits  SKIN: normal in color and non tender to palpation. There are no bruises or abrasions noted. NEUROLOGICAL: Motor sensory exam is within normal limits. Reflexes are equal bilaterally.  There is normal sensation to pinprick and light touch  MUSCULOSKELETAL:  Examination Right shoulder   Skin Intact   AC joint tenderness -   Biceps tenderness -   Forward flexion/Elevation    Active abduction    Glenohumeral abduction 80   External rotation ROM 45   Internal rotation ROM 30   Apprehension -   Thomass Relocation -   Jorgek -   Load and Shift -   Obriens -   Speeds -   Impingement sign +   Supraspinatus/Empty Can + 4/5   External Rotation Strength -, 5/5   Lift Off/Belly Press -, 5/5   Neurovascular Intact         IMAGING: XR of the right shoulder with 3 views obtained at [facility] dated 5/03/2022 was reviewed and read by myself reveals:   IMPRESSION:  1. No radiographic evidence of acute fracture. 2.  Mild degenerative changes about the glenohumeral and acromioclavicular joint as described. 3.  Findings which can be associated with rotator cuff disease. IMPRESSION:      ICD-10-CM ICD-9-CM    1. Tear of right rotator cuff, unspecified tear extent, unspecified whether traumatic  M75.101 840.4 gabapentin (NEURONTIN) 300 mg capsule      oxyCODONE IR (ROXICODONE) 5 mg immediate release tablet           PLAN:   1. Pt presents today with right shoulder pain consistent with rotator cuff tear. I discussed the risks and benefits and potential adverse outcomes of both operative vs non operative treatment of right shoulder rotator cuff tear with the patient and patient wishes to proceed with right shoulder arthroscopic rotator cuff tear. Risks of operative intervention include but not limited to bleeding, infection, deep vein thrombosis, pulmonary embolism, death, limb length discrepancy, reflexive sympathetic dystrophy, fat embolism syndrome,damage to blood vessels and nerves, malunion, non-union, delayed union, failure of hardware, post traumatic arthritis, stroke, heart attack, and death. Patient understands that infection may arise and may require numerous surgeries. The patient was counseled at length about the risks of gerry Covid-19 during their perioperative period and any recovery window from their procedure. The patient was made aware that gerry Covid-19  may worsen their prognosis for recovering from their procedure and lend to a higher morbidity and/or mortality risk.   All material risks, benefits, and reasonable alternatives including postponing the procedure were discussed. The patient does  wish to proceed with the procedure at this time. History and physical exam done today    Risk factors include: htn, BMI>30  2. No ultrasound exam indicated today  3. No cortisone injection indicated today   4. No Physical/Occupational Therapy indicated today  5. no diagnostic test indicated today:   6. No durable medical equipment indicated today  7. No referral indicated today   8. No medications indicated today:   9.  No Narcotic indicated today      RTC after surgery    NEYMAR Mcgill [address] and Spine Specialist

## 2022-08-19 NOTE — PROGRESS NOTES
HISTORY AND PHYSICAL          Patient: Nava Matos                MRN: [id number]       SSN: [id number]  YOB: 1965          AGE: 59 y.o. SEX: female      Patient scheduled for: Right shoulder arthroscopic rotator cuff repair  Surgeon: Gianluca Ho MD    ANESTHESIA TYPE:  General    HISTORY:     The patient was seen in the office today for a preoperative history and physical for an upcoming above listed surgery. The patient is a pleasant 59 y.o. female who has a history of right shoulder. Patient rates pain as 10/10 today. She was last seen in the office by Dr. Harjeet Eubanks on 7/07/2022 at which time she received a right shoulder cortisone injection with minimal relief. She reports returning discomfort in the same area of the shoulder. No new injuries since last OV. Endorses night pain. Has been taking Naproxen and Tylenol. Has tried using Voltaren gel previously without significant relief. Pain has been present for a while, she states. Due to the current findings, affected activity of daily living and continued pain and discomfort, surgical intervention is indicated. The alternatives, risks, and complications, including but not limited to infection, blood loss, need for blood transfusion, neurovascular damage, mounika-incisional numbness, subcutaneous hematoma, bone fracture, anesthetic complications, DVT, PE, death, RSD, postoperative stiffness and pain, possible surgical scar, delayed healing and nonhealing, reflexive sympathetic dystrophy, damage to blood vessels and nerves, need for more surgery, MI, and stroke,  failure of hardware, gait disturbances,have been discussed. The patient understands and wishes to proceed with surgery.      PAST MEDICAL HISTORY:     Past Medical History:   Diagnosis Date    Allergic rhinitis 7/16/2010    Asthma     Chronic shoulder pain 7/16/2010    HTN (hypertension) 7/16/2010    Hyperlipemia 1/60/7109    Lichen planus 8/1/1345 Obesity 7/16/2010       CURRENT MEDICATIONS:     Current Outpatient Medications   Medication Sig Dispense Refill    diclofenac EC (VOLTAREN) 75 mg EC tablet Take 1 Tablet by mouth two (2) times daily (with meals). 60 Tablet 0    gabapentin (NEURONTIN) 300 mg capsule Take 1 Capsule by mouth nightly for 5 days. Max Daily Amount: 300 mg. START NIGHT OF SURGERY 5 Capsule 0    oxyCODONE IR (ROXICODONE) 5 mg immediate release tablet Take 1 Tablet by mouth every four (4) hours as needed for Pain for up to 7 days. Max Daily Amount: 30 mg. DO NOT TAKE UNTIL AFTER SURGERY (for acute post operative pain) 40 Tablet 0    traMADoL (ULTRAM) 50 mg tablet Take 1 Tablet by mouth every six (6) hours as needed for Pain for up to 7 days. Max Daily Amount: 200 mg. 28 Tablet 0    meloxicam (MOBIC) 7.5 mg tablet Take 1 Tablet by mouth in the morning. 30 Tablet 1    montelukast (SINGULAIR) 10 mg tablet Take 1 Tablet by mouth nightly. 90 Tablet 1    diphenhydrAMINE (Benadryl Allergy) 25 mg tablet Take 1 Tablet by mouth every six (6) hours as needed (allergies). 30 Tablet 3    polyethylene glycol (MIRALAX) 17 gram/dose powder Take 17 g by mouth daily. 1521 g 1    naproxen (NAPROSYN) 500 mg tablet Take 1 Tablet by mouth two (2) times daily as needed for Pain. 60 Tablet 1    ondansetron (ZOFRAN ODT) 8 mg disintegrating tablet Take 1 Tablet by mouth every twelve (12) hours as needed for Nausea. 20 Tablet 0    acetaminophen (Tylenol Extra Strength) 500 mg tablet Take 1 Tablet by mouth every six (6) hours as needed for Pain (not to exceed 4 tabs in 24 hrs). 60 Tablet 0    hydrocortisone-pramoxine (PROCTOFOAM HC) rectal foam Insert 1 Applicator into rectum two (2) times a day. 1 Each 0    rosuvastatin (CRESTOR) 5 mg tablet Take 1 Tablet by mouth nightly. 90 Tablet 1    fluticasone propion-salmeteroL (ADVAIR/WIXELA) 250-50 mcg/dose diskus inhaler Take 1 Puff by inhalation every twelve (12) hours.  3 Each 1    butalbital-acetaminophen-caffeine (FIORICET, ESGIC) -40 mg per tablet Take 1 Tablet by mouth every six (6) hours as needed (for pain or headache). Not to exceed 4 tabs in 24 hrs 40 Tablet 1    albuterol (PROVENTIL VENTOLIN) 2.5 mg /3 mL (0.083 %) nebu 3 mL by Nebulization route every four (4) hours as needed for Wheezing or Shortness of Breath. 2 Each 3    ibuprofen (MOTRIN) 800 mg tablet Take 1 Tablet by mouth every eight (8) hours as needed for Pain. 60 Tablet 0    amitriptyline (ELAVIL) 25 mg tablet Take 1 tab po QHS, may increase to 2 tabs po QHS      naratriptan (AMERGE) 2.5 mg tab Take 1 tab po at the onset of a migraine. Ma repeat once in 2 hours. Max 2 tabs/day.      Bertie Blank (Aimovig Autoinjector) 140 mg/mL injection 140 mg by SubCUTAneous route. albuterol (PROVENTIL HFA, VENTOLIN HFA, PROAIR HFA) 90 mcg/actuation inhaler Take 2 Puffs by inhalation every four (4) hours as needed for Wheezing or Shortness of Breath. 1 Inhaler 6    Nebulizer & Compressor machine 1 Each by Does Not Apply route every four (4) hours as needed. 1 Each 0    methocarbamoL (ROBAXIN) 500 mg tablet Take 1 Tablet by mouth four (4) times daily as needed for Muscle Spasm(s). (Patient not taking: No sig reported) 120 Tablet 0    gabapentin (NEURONTIN) 100 mg capsule TAKE 1 CAPSULE BY MOUTH AT BEDTIME (Patient not taking: No sig reported)      oxyCODONE-acetaminophen (PERCOCET) 5-325 mg per tablet TAKE 1 TABLET BY MOUTH EVERY 8 HOURS AS NEEDED FOR 7 DAYS (Patient not taking: No sig reported)      cloNIDine (CATAPRES) 0.1 mg/24 hr ptwk 1 Patch by TransDERmal route every seven (7) days. (Patient not taking: No sig reported) 12 Patch 2    Transparent Dressings (Tegaderm Frame Style) 2 3/8 X 2 3/4 \" bndg To use with clonidine patch weekly (Patient not taking: No sig reported) 20 Each 1    ospemifene (OSPHENA) 60 mg tab tablet Take 1 Tab by mouth daily.  (Patient not taking: No sig reported) 90 Tab 3    loratadine (CLARITIN REDITABS) 10 mg dissolvable tablet Take 1 Tab by mouth daily. (Patient not taking: No sig reported) 30 Tab 2       ALLERGIES:     Allergies   Allergen Reactions    Hiram Anaphylaxis    Cherry Anaphylaxis    Methocarbamol Nausea and Vomiting    Asa-Acetaminophen-Caff-[place]ass Hives     Just allergic to aspirin only per Pt. [place] Other (comments)     syncope    [place] Concentrate [Flavoring Agent] Nausea and Vomiting    Zyrtec [Cetirizine] Other (comments)     Dizziness, blurred vision and disoriented         SURGICAL HISTORY:     Past Surgical History:   Procedure Laterality Date    HX HYSTERECTOMY  5/16/14    HX HYSTERECTOMY         SOCIAL HISTORY:     Social History     Socioeconomic History    Marital status:    Tobacco Use    Smoking status: Never    Smokeless tobacco: Never   Vaping Use    Vaping Use: Never used   Substance and Sexual Activity    Alcohol use: No    Drug use: Yes     Types: Marijuana    Sexual activity: Yes     Partners: Male     Birth control/protection: Surgical     Comment: btl       FAMILY HISTORY:     Family History   Problem Relation Age of Onset    Hypertension Mother     Diabetes Mother     Hypertension Sister     Diabetes Maternal Grandmother     Cancer Maternal Aunt         breast at age 54    Cancer Other         lung ca in great [facility]. No Known Problems Father        REVIEW OF SYSTEMS:     Negative for fevers, chills, chest pain, shortness of breath, weight loss, recent illness     General: Negative for fever and chills. No unexpected change in weight. Denies fatigue. No change in appetite. Skin: Negative for rash or itching. HEENT: Negative for congestion, sore throat, neck pain and neck stiffness. No change in vision or hearing. Hasn't noted any enlarged lymph nodes in the neck. Cardiovascular:  Negative for chest pain and palpitations. Has not noted pedal edema. Respiratory: Negative for cough, colds, sinus, hemoptysis, shortness of breath and wheezing.   Gastrointestinal: Negative for nausea and vomiting, rectal bleeding, coffee ground emesis, abdominal pain, diarrhea and constipation. Genitourinary: Negative for dysuria, frequency urgency, or burning on micturition. No flank pain, no foul smelling urine, no difficulty with initiating urination. Hematological: Negative for bleeding or easy bruising. Musculoskeletal: Negative  for arthralgias, back pain or neck pain. Neurological: Negative for dizziness, seizures or syncopal episodes. Denies headaches. Endocrine: Denies excessive thirst.  No heat/cold intolerance. Psychiatric: Negative for depression or insomnia. PHYSICAL EXAMINATION:     VITALS: Visit Vitals  Temp 97.8 °F (36.6 °C)   Ht 5' 1\" (1.549 m)   Wt 173 lb (78.5 kg)   LMP 05/16/2014   BMI 32.69 kg/m²     GEN:  Well developed, well nourished 59 y.o. female in no acute distress. HEENT: Normocephalic and atraumatic. Eyes: Conjunctivae and EOM are normal.Pupils are equal, round, and reactive to light. External ear normal appearance, external nose normal appearing. Mouth/Throat: Oropharynx is clear and moist, able to handle oral secretions w/out difficulty, airway patent  NECK: Supple. Normal ROM, No lymphadenopathy. Trachea is midline. No bruising, swelling or deformity  RESP: Clear to auscultation bilaterally. No wheezes, rales, rhonchi. Normal effort and breath sounds. No respiratory distress  CARDIO:  Normal rate, regular rhythm and normal heart sounds. No MGR. ABDOMEN: Soft, non-tender, non-distended, normoactive bowel sounds in all four quadrants. There is no tenderness. There is no rebound and no guarding.    BACK: No CVA or spinal tenderness  BREAST:  Deferred  PELVIC:    Deferred   RECTAL:  Deferred   :           Deferred  EXTREMITIES: EXAMINATION OF: right shoulder  Examination Right shoulder   Skin Intact   AC joint tenderness -   Biceps tenderness -   Forward flexion/Elevation    Active abduction    Glenohumeral abduction 80   External rotation ROM 45   Internal rotation ROM 30   Apprehension -   Thomass Relocation -   Jerk -   Load and Shift -   Obriens -   Speeds -   Impingement sign +   Supraspinatus/Empty Can + 4/5   External Rotation Strength -, 5/5   Lift Off/Belly Press -, 5/5   Neurovascular Intact          NEUROVASCULAR: Sensation intact to light touch and strength grossly intact and symmetrical. No nystagmus. Positive distal pulses and capillary refill. DVT ASSESSMENT:  There is not  calf tenderness. No evidence of DVT seen on physical exam.  MOTOR: In tact  PSYCH: Alert an oriented to person, place and time. Mood, memory, affect, behavior and judgment normal       RADIOGRAPHS & DIAGNOSTIC STUDIES:     MRI/xray reveals :   IMAGING: XR of the right shoulder with 3 views obtained at [facility] dated 5/03/2022 was reviewed and read by myself reveals:   IMPRESSION:  1. No radiographic evidence of acute fracture. 2.  Mild degenerative changes about the glenohumeral and acromioclavicular joint as described. 3.  Findings which can be associated with rotator cuff disease. LABS:   Labs pending      ASSESSMENT:       Encounter Diagnosis   Name Primary? Tear of right rotator cuff, unspecified tear extent, unspecified whether traumatic Yes       PLAN:     Again, the alternatives, risks, and complications, as well as expected outcome were discussed. The patient understands and agrees to proceed with right shoulder arthroscopic rotator cuff repair. The patient was counseled at length about the risks of gerry Covid-19 during their perioperative period and any recovery window from their procedure. The patient was made aware that gerry Covid-19  may worsen their prognosis for recovering from their procedure and lend to a higher morbidity and/or mortality risk. All material risks, benefits, and reasonable alternatives including postponing the procedure were discussed. The patient does  wish to proceed with the procedure at this time. Patient given orders listed below:    Orders Placed This Encounter    gabapentin (NEURONTIN) 300 mg capsule    oxyCODONE IR (ROXICODONE) 5 mg immediate release tablet    traMADoL (ULTRAM) 50 mg tablet         Teagan Geller PA-C  8/19/2022  3:38 PM

## 2022-08-19 NOTE — PATIENT INSTRUCTIONS
Dr. Maria Elena Campoverde Shoulder Arthroscopy Information    What is the surgery? This is an outpatient procedure at either [facility] or [address] will be completely asleep for the procedure. Dr. Maria Elena Campoverde will make somewhere between 2-5 small incisions in your shoulder depending on the amount of work to be completed. He will take a tour of your shoulder with the camera and fix everything that needs to be fixed during your surgery. Total surgery takes about 45 mins to an hour and half depending on how much needed to be repaired    What can you expect after surgery? You will have a bulky dressing on your shoulder that you can remove 2 days after surgery. You will be able to shower 2 days after surgery but no soaking in a bath, hot tub, ocean or pool x 2 weeks to allow for full wound healing  You will be in a sling for 5-6 weeks depending on your repair. You will wear this sling whenever you are active, up moving around, and sleeping at night. This sling is to keep you from moving your arm on your own. You are essentially one armed until you are out of your sling. This means no reaching, pulling, grabbing or lifting with the operative arm. Dr. Maria Elena Campoverde will start physical therapy for you the first business day after surgery. While you cannot move your arm we allow physical therapy to gently move your shoulder. We call this passive range of motion. The goal of this is to decrease your stiffness and in turn decrease your post operative pain. Plan on being in physical therapy for 10-12 weeks    When can I return to work? Most patients return to desk work only after 2 weeks. You are able to type but there is no overhead work or lifting  You will start some gentle lifting up to 5-10lbs at about 8-10 weeks post operatively. You will gradually increase how much you are able to lift after this point under the guidance of Dr. Maria Elena Campoverde, his physician assistant and physical therapy.   At 6 months you are able to do all activities as tolerated but it may take you a full 9-12 months to fully recover from your surgery    Not all shoulder arthroscopies are the same. The specifics of your individual case will be discussed at length with you by Dr. Sulema Fried and his Physician Assistant. Noa Baumann  Surgical Coordinator  [address] [place] [address]. Brayden.  [address] [place] [address], [email or web address]  P: [phone]  F: [phone]

## 2022-08-22 ENCOUNTER — TELEPHONE (OUTPATIENT)
Dept: ORTHOPEDIC SURGERY | Age: 57
End: 2022-08-22

## 2022-08-22 DIAGNOSIS — M75.101 TEAR OF RIGHT ROTATOR CUFF, UNSPECIFIED TEAR EXTENT, UNSPECIFIED WHETHER TRAUMATIC: Primary | ICD-10-CM

## 2022-08-22 DIAGNOSIS — Z01.818 PREOP EXAMINATION: ICD-10-CM

## 2022-08-22 NOTE — TELEPHONE ENCOUNTER
Patient is being added on for surgery next Monday for her right shoulder rotator cuff repair. 08/29/2022. She needs a work note. Please advise patient when available.

## 2022-08-22 NOTE — TELEPHONE ENCOUNTER
Please give note stating surgery on 8/29. Will be out of work x 12 weeks.  Can return to light duty if patient requests

## 2022-08-23 ENCOUNTER — HOSPITAL ENCOUNTER (OUTPATIENT)
Dept: LAB | Age: 57
Discharge: HOME | End: 2022-08-23
Payer: OTHER GOVERNMENT

## 2022-08-23 ENCOUNTER — TELEPHONE (OUTPATIENT)
Dept: PHYSICAL THERAPY | Age: 57
End: 2022-08-23

## 2022-08-23 DIAGNOSIS — Z01.818 PREOP EXAMINATION: ICD-10-CM

## 2022-08-23 LAB
ANION GAP SERPL CALC-SCNC: 3 MMOL/L (ref 3–18)
ATRIAL RATE: 72 BPM
BASOPHILS # BLD: 0 K/UL (ref 0–0.1)
BASOPHILS NFR BLD: 1 % (ref 0–2)
BUN SERPL-MCNC: 13 MG/DL (ref 7–18)
BUN/CREAT SERPL: 17 (ref 12–20)
CALCIUM SERPL-MCNC: 9.1 MG/DL (ref 8.5–10.1)
CALCULATED P AXIS, ECG09: 42 DEGREES
CALCULATED R AXIS, ECG10: 16 DEGREES
CALCULATED T AXIS, ECG11: 37 DEGREES
CHLORIDE SERPL-SCNC: 108 MMOL/L (ref 100–111)
CO2 SERPL-SCNC: 29 MMOL/L (ref 21–32)
CREAT SERPL-MCNC: 0.76 MG/DL (ref 0.6–1.3)
DIAGNOSIS, 93000: NORMAL
DIFFERENTIAL METHOD BLD: ABNORMAL
EOSINOPHIL # BLD: 0.2 K/UL (ref 0–0.4)
EOSINOPHIL NFR BLD: 3 % (ref 0–5)
ERYTHROCYTE [DISTWIDTH] IN BLOOD BY AUTOMATED COUNT: 14 % (ref 11.6–14.5)
GLUCOSE SERPL-MCNC: 80 MG/DL (ref 74–99)
HCT VFR BLD AUTO: 34.7 % (ref 35–45)
HGB BLD-MCNC: 10.5 G/DL (ref 12–16)
IMM GRANULOCYTES # BLD AUTO: 0 K/UL (ref 0–0.04)
IMM GRANULOCYTES NFR BLD AUTO: 1 % (ref 0–0.5)
LYMPHOCYTES # BLD: 2.6 K/UL (ref 0.9–3.6)
LYMPHOCYTES NFR BLD: 41 % (ref 21–52)
MCH RBC QN AUTO: 25.3 PG (ref 24–34)
MCHC RBC AUTO-ENTMCNC: 30.3 G/DL (ref 31–37)
MCV RBC AUTO: 83.6 FL (ref 78–100)
MONOCYTES # BLD: 0.6 K/UL (ref 0.05–1.2)
MONOCYTES NFR BLD: 10 % (ref 3–10)
NEUTS SEG # BLD: 2.9 K/UL (ref 1.8–8)
NEUTS SEG NFR BLD: 46 % (ref 40–73)
NRBC # BLD: 0 K/UL (ref 0–0.01)
NRBC BLD-RTO: 0 PER 100 WBC
P-R INTERVAL, ECG05: 180 MS
PLATELET # BLD AUTO: 265 K/UL (ref 135–420)
PMV BLD AUTO: 10 FL (ref 9.2–11.8)
POTASSIUM SERPL-SCNC: 3.8 MMOL/L (ref 3.5–5.5)
Q-T INTERVAL, ECG07: 374 MS
QRS DURATION, ECG06: 82 MS
QTC CALCULATION (BEZET), ECG08: 409 MS
RBC # BLD AUTO: 4.15 M/UL (ref 4.2–5.3)
SODIUM SERPL-SCNC: 140 MMOL/L (ref 136–145)
VENTRICULAR RATE, ECG03: 72 BPM
WBC # BLD AUTO: 6.4 K/UL (ref 4.6–13.2)

## 2022-08-23 PROCEDURE — 36415 COLL VENOUS BLD VENIPUNCTURE: CPT

## 2022-08-23 PROCEDURE — 85025 COMPLETE CBC W/AUTO DIFF WBC: CPT

## 2022-08-23 PROCEDURE — 93005 ELECTROCARDIOGRAM TRACING: CPT

## 2022-08-23 PROCEDURE — 80048 BASIC METABOLIC PNL TOTAL CA: CPT

## 2022-08-26 ENCOUNTER — TELEPHONE (OUTPATIENT)
Dept: PHYSICAL THERAPY | Age: 57
End: 2022-08-26

## 2022-09-06 ENCOUNTER — OFFICE VISIT (OUTPATIENT)
Dept: ORTHOPEDIC SURGERY | Age: 57
End: 2022-09-06
Payer: OTHER GOVERNMENT

## 2022-09-06 DIAGNOSIS — M75.101 TEAR OF RIGHT ROTATOR CUFF, UNSPECIFIED TEAR EXTENT, UNSPECIFIED WHETHER TRAUMATIC: Primary | ICD-10-CM

## 2022-09-06 DIAGNOSIS — R20.0 NUMBNESS OF TONGUE: ICD-10-CM

## 2022-09-06 PROCEDURE — 99024 POSTOP FOLLOW-UP VISIT: CPT | Performed by: PHYSICIAN ASSISTANT

## 2022-09-06 NOTE — PROGRESS NOTES
Keo Dale  1965     HISTORY OF PRESENT ILLNESS  Keo Dale is a 59 y.o. female who presents today for evaluation s/p Right shoulder arthroscopic 3cm rotator cuff repair on 8/29. Patient has not been going to PT. Biggest complaint is numbness in her tongue. Describes pain as a 3/10. Has been taking minimal meds for pain. Still has night pain. Patient denies any fever, chills, chest pain, shortness of breath or calf pain. The remainder of the review of systems is negative. There are no new illness or injuries to report since last seen in the office. No changes in medications, allergies, social or family history. PHYSICAL EXAM:   Visit Vitals  LMP 05/16/2014      The patient is a well-developed, well-nourished female in no acute distress. The patient is alert and oriented times three. The patient appears to be well groomed. Mood and affect are normal.  ORTHOPEDIC EXAM of right shoulder:  Inspection: swelling present,  Bruising present  Incision, clean, dry, intact, sutures in place  Passive glenohumeral abduction 0-50 degrees  Stability: Stable  Strength: n/a  2+ distal pulses    IMPRESSION:  S/P Right shoulder arthroscopic 3cm rotator cuff repair    PLAN:   Incisions cleaned. Surgery was discussed at length today. Patient to continue with PROM and PT. Continue wearing sling. Stressed to patient that nothing causes an increase in pain.  Urgent referral to ENT for tongue numbness   RTC 4 weeks    NEYMAR Joaquin and Spine Specialist

## 2022-09-12 ENCOUNTER — HOSPITAL ENCOUNTER (OUTPATIENT)
Dept: PHYSICAL THERAPY | Age: 57
Discharge: HOME | End: 2022-09-12
Attending: ORTHOPAEDIC SURGERY
Payer: OTHER GOVERNMENT

## 2022-09-12 DIAGNOSIS — M75.101 TEAR OF RIGHT ROTATOR CUFF, UNSPECIFIED TEAR EXTENT, UNSPECIFIED WHETHER TRAUMATIC: Primary | ICD-10-CM

## 2022-09-12 PROCEDURE — 97162 PT EVAL MOD COMPLEX 30 MIN: CPT

## 2022-09-12 PROCEDURE — 97110 THERAPEUTIC EXERCISES: CPT

## 2022-09-12 NOTE — PROGRESS NOTES
[facility]  [address]  [place], [address]  [phone] fax    Plan of Care/ Statement of Necessity for Physical Therapy Services    Patient name: Edward Jim Start of Care: 2022   Referral source: Jeanette Evans,* : 1965    Medical Diagnosis: Tear of right rotator cuff, unspecified tear extent, unspecified whether traumatic [M75.101]  Payor:  / Plan: Tone Madrigal 74 / Product Type:  /  Onset Date:  22   Treatment Diagnosis: Right shoulder pain   Prior Hospitalization: see medical history Provider#: [id number]   Medications: Verified on Patient summary List    Comorbidities: Patient confirmed: Allergies, Arthritis, Asthma, BMI over 30, HA, Prior surgery, Sleep dysfunction, tobacco use    Prior Level of Function: Patient is right hand dominant. Patient enjoys sewing and crafts and was able to do that prior to the injury. The Plan of Care and following information is based on the information from the initial evaluation. Assessment/ key information: Patient presents with right shoulder pain S/P RTC repair on 22. (Per op note a 3cm tear). Patient denies any post-op complications. Patient describes right shoulder pain as intermittent aching/throbbing and occasional sharp/shooting pain. Patient denies numbness/tingling in right UE. Patient reports numbness at tip of tongue and has been like that since surgery. Patient is following up with a specialist. Patient stated the numbness is not as bad as it was. Therapist reviewed precautions with surgery and signs and symptoms of infection. Patient exhibits increased pain and decreased right shoulder PROM. Patient demonstrates potential to make functional gains within a reasonable time frame. Patient would benefit from skilled PT to address above deficits and assist with return to PLOF.    Evaluation Complexity History MEDIUM Complexity : 1-2 comorbidities / personal factors will impact the outcome/ POC ; Examination MEDIUM Complexity : 3 Standardized tests and measures addressing body structure, function, activity limitation and / or participation in recreation  ;Presentation MEDIUM Complexity : Evolving with changing characteristics  ; Clinical Decision Making MEDIUM Complexity : FOTO score of 26-74  Overall Complexity Rating: MEDIUM  Problem List: pain affecting function, decrease ROM, decrease strength, edema affecting function, decrease ADL/ functional abilitiies, decrease activity tolerance, decrease flexibility/ joint mobility, and decrease transfer abilities   Treatment Plan may include any combination of the following: Therapeutic exercise, Therapeutic activities, Neuromuscular re-education, Physical agent/modality, Manual therapy, Patient education, Self Care training, Functional mobility training, and Home safety training  Patient / Family readiness to learn indicated by: asking questions, trying to perform skills, and interest  Persons(s) to be included in education: patient (P)  Barriers to Learning/Limitations: None  Patient Goal (s):  \"no pain and full mobility\"   Patient Self Reported Health Status: good  Rehabilitation Potential: good    Short Term Goals: To be accomplished in 2 weeks:   1. Patient will be independent and compliant with HEP 1-2x/day to increase ease with ADls safely. Eval; HEP established      Long Term Goals: To be accomplished in 4 weeks:   1. Patient will improve FOTO to at least 65 to demonstrate improved function. Eval; FOTO: 42   2. Patient will improve Right shoulder PROM flexion and scaption to 120deg to avoid detrimental effects of immobilization   Eval: Right shoulder PROM flexion and scaption: 90 deg    3. Patient will report average pain level no more then 2/10 to assist with return to PLOF.    Eval: Pain level 4/10   Frequency / Duration: Patient to be seen 2 times per week for 4 weeks. Patient/ Caregiver education and instruction: Diagnosis, prognosis, exercises   [x]  Plan of care has been reviewed with AUBREY Dave, PT 9/12/2022 8:13 AM    ________________________________________________________________________    I certify that the above Therapy Services are being furnished while the patient is under my care. I agree with the treatment plan and certify that this therapy is necessary.     [de-identified] Signature:____________Date:_________TIME:________     [facility] Shows,*  ** Signature, Date and Time must be completed for valid certification **    Please sign and return to [facility] [address] [place] [address] & [facility]  [address]  [place], [address]  [phone] fax

## 2022-09-12 NOTE — PROGRESS NOTES
PT DAILY TREATMENT NOTE/SHOULDER EVAL     Patient Name: Verna Solorio  Date:2022  : 1965  [x]  Patient  Verified  Payor:  / Plan: [facility]  [facility] REGION / Product Type:  /    In time: 8:18  Out time:9:05  Total Treatment Time (min): 47  Visit #: 1 of 8    Treatment Area: Tear of right rotator cuff, unspecified tear extent, unspecified whether traumatic [M75.101]    SUBJECTIVE  Pain Level (0-10 scale): 4/10   []constant [x]intermittent []improving []worsening []no change since onset    Any medication changes, allergies to medications, adverse drug reactions, diagnosis change, or new procedure performed?: [x] No    [] Yes (see summary sheet for update)  Subjective functional status/changes:     PLOF: Patient is right hand dominant. Patient enjoys sewing and crafts and was able to do that prior to the injury. Limitations to PLOF: Patient exhibits increased pain and decreased right shoulder PROM. Mechanism of Injury: Patient presents with right shoulder pain s/p Right RTC repair (per post-op note 3cm tear) on 22. Patient denies any post-op complications. Patient denies a specific injury leading to RTC repair. Current symptoms/Complaints: Patient describes right shoulder pain as intermittent aching/throbbing and occasional sharp/shooting pain. Patient denies numbness/tingling. Patient reports numbness at tip of tongue and has been like that since surgery. Patient is following up with a specialist. Patient stated the numbness is not as bad as it was. Therapist reviewed precautions with surgery and signs and symptoms of infection. Patient came into Eval wearing a sling, but no abduction pillow. Patient stated her sling did have a pillow, but she took it off. Advised her to contact MD office and confirm if she should still be using the abduction pillow. Previous Treatment/Compliance: prior PT for carpal tunnel in right hand.  Patient stated it resolved PMHx/Surgical Hx: Patient confirmed no previous neck/shoulder/UE/back surgeries. No pacemaker, no cardiac issues, and no cancer. 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 Pt Goals: \"no pain and full mobility\"   Cognition: A & O x 2    Other:    OBJECTIVE/EXAMINATION  Domestic Life: patient lives with  and assists as needed. 23 min [x]Eval                  []Re-Eval       24 min Therapeutic Exercise:  [x] See flow sheet : HEP creation and review; PROM performed by therapist into flexion, scaption, and ER in POS. (PROM maintained within pain free range. No overpressure applied)    Rationale: increase ROM to improve the patients ability to perform ADLs safely. With   [x] TE   [] TA   [] neuro   [] other: Patient Education: [x] Review HEP    [] Progressed/Changed HEP based on:   [] positioning   [] body mechanics   [] transfers   [] heat/ice application    [] other: Educated patient on precautions with RTC repair and signs and symptoms of infection and to contact MD if any occur. Other Objective/Functional Measures:     Physical Therapy Evaluation - Shoulder    Posture: [x] Poor    [] Fair    [] Good    Describe: forward head, rounded shoulders. ROM:  [] Unable to assess at this time                                           AROM                                                              PROM   Left Right  Left Right   Flexion NT NT Flexion  90deg   Extension   Extension     Scaption/ABD   Scaptin     ER @ 0 Degrees   ER @ 0 Degrees  90 deg   ER @ 90 Degrees   ER @ POS  15deg   IR @ 90 Degrees   IR @ 90 Degrees       End Feel / Painful Arc: Patient reported no pain or discomfort with PROM into any direction. No restriction noted.      Strength:   [x] Unable to assess at this time                                                                            L (1-5) R (1-5) Pain   Flexors   [] Yes   [] No   Abductors   [] Yes   [] No   External Rotators   [] Yes   [] No   Internal Rotators   [] Yes   [] No   Supraspinatus   [] Yes   [] No   Serratus Anterior   [] Yes   [] No   Lower Trapezius   [] Yes   [] No   Elbow Flexion   [] Yes   [] No   Elbow Extension   [] Yes   [] No       Scapulohumoral Control / Rhythm:  Able to eccentrically lower with good control? Left: [] Yes   [] No     Right: [] Yes   [x] No    Accessory Motions:    Palpation  Incisions observed and some bruising and scabbing noted, but no signs or symptoms of infection noted. Reviewed signs and symptoms with patient and advised to contact MD if any occur. Other Tests / Comments:        Pain Level (0-10 scale) post treatment: 3/10     ASSESSMENT/Changes in Function:  patient reported no pain with manual PROM. Advised patient to perform HEP gently and to stop if pain increases. Patient will continue to benefit from skilled PT services to modify and progress therapeutic interventions, address functional mobility deficits, address ROM deficits, address strength deficits, analyze and address soft tissue restrictions, analyze and cue movement patterns, analyze and modify body mechanics/ergonomics, and assess and modify postural abnormalities to attain remaining goals.      [x]  See Plan of Care  []  See progress note/recertification  []  See Discharge Summary         Progress towards goals / Updated goals:  See POC    PLAN  []  Upgrade activities as tolerated     [x]  Continue plan of care  []  Update interventions per flow sheet       []  Discharge due to:_  []  Other:_      Ayaka Harvey, PT 9/12/2022  8:16 AM

## 2022-09-16 ENCOUNTER — HOSPITAL ENCOUNTER (OUTPATIENT)
Dept: PHYSICAL THERAPY | Age: 57
Discharge: HOME | End: 2022-09-16
Attending: ORTHOPAEDIC SURGERY
Payer: OTHER GOVERNMENT

## 2022-09-16 PROCEDURE — 97140 MANUAL THERAPY 1/> REGIONS: CPT

## 2022-09-16 PROCEDURE — 97110 THERAPEUTIC EXERCISES: CPT

## 2022-09-16 NOTE — PROGRESS NOTES
PT DAILY TREATMENT NOTE     Patient Name: Maria D Ford  Date:2022  : 1965  [x]  Patient  Verified  Payor:  / Plan: [facility] REGION / Product Type:  /    In time:1:30  Out time:2:05  Total Treatment Time (min): 35  Visit #: 2 of 8      Treatment Area: Right shoulder pain [M25.511]    SUBJECTIVE  Pain Level (0-10 scale): 2  Any medication changes, allergies to medications, adverse drug reactions, diagnosis change, or new procedure performed?: [x] No    [] Yes (see summary sheet for update)  Subjective functional status/changes:   [] No changes reported  Pt reports minimal pain in the right shoulder upon arrival.    OBJECTIVE    Modality rationale: decrease inflammation and decrease pain to improve the patients ability to tolerate post workout soreness.    Min Type Additional Details    [] Estim:  []Unatt       []IFC  []Premod                        []Other:  []w/ice   []w/heat  Position:  Location:    [] Estim: []Att    []TENS instruct  []NMES                    []Other:  []w/US   []w/ice   []w/heat  Position:  Location:    []  Traction: [] Cervical       []Lumbar                       [] Prone          []Supine                       []Intermittent   []Continuous Lbs:  [] before manual  [] after manual    []  Ultrasound: []Continuous   [] Pulsed                           []1MHz   []3MHz W/cm2:  Location:    []  Iontophoresis with dexamethasone         Location: [] Take home patch   [] In clinic    []  Ice     []  heat  []  Ice massage  []  Laser   []  Anodyne Position:  Location:    []  Laser with stim  []  Other:  Position:  Location:   10 [x]  Vasopneumatic Device    [x]  Right     []  Left  Pre-treatment girth:  Post-treatment girth:  Measured at (location):  Pressure:       [x] lo [] med [] hi   Temperature: [x] lo [] med [] hi   [x] Skin assessment post-treatment:  [x]intact []redness- no adverse reaction    []redness - adverse reaction:         15 min Therapeutic Exercise:  [x] See flow sheet :   Rationale: increase ROM, increase strength, and improve coordination to improve the patients ability to perform functional task with ease. 10 min Manual Therapy:  PROM to the right shoulder in supine. The manual therapy interventions were performed at a separate and distinct time from the therapeutic activities interventions. Rationale: decrease pain, increase ROM, and increase tissue extensibility to improve functional mobility with overhead reaching ADL's. With   [] TE   [] TA   [] neuro   [] other: Patient Education: [x] Review HEP    [] Progressed/Changed HEP based on:   [] positioning   [] body mechanics   [] transfers   [] heat/ice application    [] other:      Other Objective/Functional Measures: initiated therex per flow sheet. Pain Level (0-10 scale) post treatment: 0    ASSESSMENT/Changes in Function:   First follow up session with pt displaying a good tolerance to therex and putting forth good effort with exercises. The pt displays excellent PROM of the right shoulder and notes minimal pain/discomfort. HEP understanding and compliance reported with no questions at this time. Minor review of precautions during manual. Pt reports no pain post treatment. Patient will continue to benefit from skilled PT services to modify and progress therapeutic interventions, address functional mobility deficits, address ROM deficits, address strength deficits, analyze and address soft tissue restrictions, analyze and cue movement patterns, analyze and modify body mechanics/ergonomics, and assess and modify postural abnormalities to attain remaining goals. [x]  See Plan of Care  []  See progress note/recertification  []  See Discharge Summary         Progress towards goals / Updated goals:  Short Term Goals: To be accomplished in 2 weeks:               1. Patient will be independent and compliant with HEP 1-2x/day to increase ease with ADls safely. Eval; HEP established   Current: Met 9/16/22 Pt reports understanding and compliance. Long Term Goals: To be accomplished in 4 weeks:               1. Patient will improve FOTO to at least 65 to demonstrate improved function. Eval; FOTO: 42               2. Patient will improve Right shoulder PROM flexion and scaption to 120deg to avoid detrimental effects of immobilization               Eval: Right shoulder PROM flexion and scaption: 90 deg                3. Patient will report average pain level no more then 2/10 to assist with return to PLOF.                Eval: Pain level 4/10     PLAN  []  Upgrade activities as tolerated     [x]  Continue plan of care  []  Update interventions per flow sheet       []  Discharge due to:_  []  Other:_      Nancy Casiano PTA 9/16/2022  1:33 PM    Future Appointments   Date Time Provider Desirae Lucia   9/20/2022 10:30 AM Kwaku Haynes, PT [facility]   9/22/2022  8:30 AM Ezequiel Tristin, PTA [facility]   9/26/2022  9:45 AM Ezequiel Crow, PTA [facility]   9/29/2022 10:00 AM Basil Tristin, PTA [facility]   10/3/2022  9:00 AM Ritu Osullivan, PT [facility]   10/4/2022  1:45 PM REYNALDO Duffy [facility]   10/5/2022  9:00 AM Ritu Osullivan, PT [facility]

## 2022-09-20 ENCOUNTER — HOSPITAL ENCOUNTER (OUTPATIENT)
Dept: PHYSICAL THERAPY | Age: 57
Discharge: HOME | End: 2022-09-20
Attending: ORTHOPAEDIC SURGERY
Payer: OTHER GOVERNMENT

## 2022-09-20 PROCEDURE — 97016 VASOPNEUMATIC DEVICE THERAPY: CPT

## 2022-09-20 PROCEDURE — 97110 THERAPEUTIC EXERCISES: CPT

## 2022-09-20 NOTE — PROGRESS NOTES
PT DAILY TREATMENT NOTE     Patient Name: Giovanna Mcguire  Date:2022  : 1965  [x]  Patient  Verified  Payor:  / Plan: [facility]  [facility] REGION / Product Type:  /    In time: 10:33 AM Out time: 11:07 AM  Total Treatment Time (min): 34  Visit #: 3 of 8    Treatment Area: Right shoulder pain [M25.511]    SUBJECTIVE  Pain Level (0-10 scale): 0  Any medication changes, allergies to medications, adverse drug reactions, diagnosis change, or new procedure performed?: [x] No    [] Yes (see summary sheet for update)  Subjective functional status/changes:   [x] No changes reported    OBJECTIVE    Modality rationale: decrease edema and decrease pain to improve the patients ability to tolerate post exercise soreness. Min Type Additional Details   10 []  Vasopneumatic Device    [x]  Right     []  Left  Pre-treatment girth:  Post-treatment girth:  Measured at (location):  Pressure:       [x] lo [] med [] hi   Temperature: [x] lo [] med [] hi   [] Skin assessment post-treatment:  []intact []redness- no adverse reaction    []redness - adverse reaction:       24 min Therapeutic Exercise:  [x] See flow sheet : including PROM right shoulder   Rationale: increase ROM and increase strength to improve the patients ability to complete ADLs. With   [] TE   [] TA   [] neuro   [] other: Patient Education: [x] Review HEP    [] Progressed/Changed HEP based on:   [] positioning   [] body mechanics   [] transfers   [] heat/ice application    [] other:      Other Objective/Functional Measures:      Pain Level (0-10 scale) post treatment: 0    ASSESSMENT/Changes in Function: Continues with good progression in PROM and well managed pain.      Patient will continue to benefit from skilled PT services to modify and progress therapeutic interventions, address functional mobility deficits, address ROM deficits, address strength deficits, analyze and address soft tissue restrictions, analyze and cue movement patterns, and analyze and modify body mechanics/ergonomics to attain remaining goals. []  See Plan of Care  []  See progress note/recertification  []  See Discharge Summary         Progress towards goals / Updated goals:  Short Term Goals: To be accomplished in 2 weeks:               1. Patient will be independent and compliant with HEP 1-2x/day to increase ease with ADls safely. Eval; HEP established   Current: Met 9/16/22 Pt reports understanding and compliance. Long Term Goals: To be accomplished in 4 weeks:               1. Patient will improve FOTO to at least 65 to demonstrate improved function. Eval; FOTO: 42               2. Patient will improve Right shoulder PROM flexion and scaption to 120deg to avoid detrimental effects of immobilization               Eval: Right shoulder PROM flexion and scaption: 90 deg                3. Patient will report average pain level no more then 2/10 to assist with return to PLOF.                Eval: Pain level 4/10    Current 9/20/2022 - Progressing - 0/10 this date, will reassess next session to detemine consistency in downward trend    PLAN  []  Upgrade activities as tolerated     []  Continue plan of care  []  Update interventions per flow sheet       []  Discharge due to:_  []  Other:_      Xuan Funk, PT 9/20/2022  7:31 AM    Future Appointments   Date Time Provider Desirae Lucia   9/20/2022 10:30 AM Laura Marr PT [facility]   9/22/2022  8:30 AM Malka Osorio, AUBREY [facility]   9/26/2022  9:45 AM Malka Osorio, AUBREY [facility]   9/29/2022 10:00 AM Malka Osorio PTA [facility]   10/3/2022  9:00 AM Liliana Abarca PT [facility]   10/4/2022  1:45 PM REYNALDO Meza [facility]   10/5/2022  9:00 AM Liliana Abarca, PT [facility]

## 2022-09-26 ENCOUNTER — APPOINTMENT (OUTPATIENT)
Dept: PHYSICAL THERAPY | Age: 57
End: 2022-09-26
Attending: ORTHOPAEDIC SURGERY
Payer: OTHER GOVERNMENT

## 2022-09-29 ENCOUNTER — HOSPITAL ENCOUNTER (OUTPATIENT)
Dept: PHYSICAL THERAPY | Age: 57
Discharge: HOME | End: 2022-09-29
Attending: ORTHOPAEDIC SURGERY
Payer: OTHER GOVERNMENT

## 2022-09-29 PROCEDURE — 97110 THERAPEUTIC EXERCISES: CPT

## 2022-09-29 PROCEDURE — 97140 MANUAL THERAPY 1/> REGIONS: CPT

## 2022-09-29 NOTE — PROGRESS NOTES
PT DAILY TREATMENT NOTE     Patient Name: Angelica Ruano  Date:2022  : 1965  [x]  Patient  Verified  Payor:  / Plan: [facility] REGION / Product Type:  /    In time:9:50  Out time:10:22  Total Treatment Time (min): 32  Visit #: 3 of 8        Treatment Area: Right shoulder pain [M25.511]    SUBJECTIVE  Pain Level (0-10 scale): 4  Any medication changes, allergies to medications, adverse drug reactions, diagnosis change, or new procedure performed?: [x] No    [] Yes (see summary sheet for update)  Subjective functional status/changes:   [] No changes reported  Pt reports her right shoulder is a little sore today but it's not feeling too bad. OBJECTIVE    Modality rationale: decrease inflammation and decrease pain to improve the patients ability to perform ADL's.    Min Type Additional Details    [] Estim:  []Unatt       []IFC  []Premod                        []Other:  []w/ice   []w/heat  Position:  Location:    [] Estim: []Att    []TENS instruct  []NMES                    []Other:  []w/US   []w/ice   []w/heat  Position:  Location:    []  Traction: [] Cervical       []Lumbar                       [] Prone          []Supine                       []Intermittent   []Continuous Lbs:  [] before manual  [] after manual    []  Ultrasound: []Continuous   [] Pulsed                           []1MHz   []3MHz W/cm2:  Location:    []  Iontophoresis with dexamethasone         Location: [] Take home patch   [] In clinic    []  Ice     []  heat  []  Ice massage  []  Laser   []  Anodyne Position:  Location:    []  Laser with stim  []  Other:  Position:  Location:   10 [x]  Vasopneumatic Device    [x]  Right     []  Left  Pre-treatment girth:  Post-treatment girth:  Measured at (location):  Pressure:       [x] lo [] med [] hi   Temperature: [x] lo [] med [] hi   [x] Skin assessment post-treatment:  [x]intact []redness- no adverse reaction    []redness - adverse reaction:       12 min Therapeutic Exercise:  [x] See flow sheet :   Rationale: increase ROM, increase strength, and improve coordination to improve the patients ability to perform functional task with ease. 10 min Manual Therapy:   PROM to the right shoulder in supine. The manual therapy interventions were performed at a separate and distinct time from the therapeutic activities interventions. Rationale: decrease pain, increase ROM, and increase tissue extensibility to improve functional mobility with overhead reaching ADL's per protocol. With   [] TE   [] TA   [] neuro   [] other: Patient Education: [x] Review HEP    [] Progressed/Changed HEP based on:   [] positioning   [] body mechanics   [] transfers   [] heat/ice application    [] other:      Other Objective/Functional Measures:      Pain Level (0-10 scale) post treatment: 0    ASSESSMENT/Changes in Function:   Pt is 4 weeks out from her initial surgery and continues to display improving PROM of the right shoulder meeting her LTG #2. Patient will continue to benefit from skilled PT services to modify and progress therapeutic interventions, address functional mobility deficits, address ROM deficits, address strength deficits, analyze and address soft tissue restrictions, analyze and cue movement patterns, analyze and modify body mechanics/ergonomics, and assess and modify postural abnormalities to attain remaining goals. [x]  See Plan of Care  []  See progress note/recertification  []  See Discharge Summary         Progress towards goals / Updated goals:  Short Term Goals: To be accomplished in 2 weeks:               1. Patient will be independent and compliant with HEP 1-2x/day to increase ease with ADls safely. Eval; HEP established   Current: Met 9/16/22 Pt reports understanding and compliance. Long Term Goals:  To be accomplished in 4 weeks:               1. Patient will improve FOTO to at least 65 to demonstrate improved function. Eval; FOTO: 42               2. Patient will improve Right shoulder PROM flexion and scaption to 120deg to avoid detrimental effects of immobilization               Eval: Right shoulder PROM flexion and scaption: 90 deg    Current: Met 9/29/22 right shoulder PROM flexion 151 deg, scaption 150 deg               3. Patient will report average pain level no more then 2/10 to assist with return to PLOF.                Eval: Pain level 4/10        PLAN  []  Upgrade activities as tolerated     [x]  Continue plan of care  []  Update interventions per flow sheet       []  Discharge due to:_  []  Other:_      Le Moran PTA 9/29/2022  9:45 AM    Future Appointments   Date Time Provider Desirae Joyai   9/29/2022 10:00 AM Shalonda Diaz PTA [facility]   10/3/2022  9:00 AM Alexandru Michelle, PT [facility]   10/4/2022  1:45 PM REYNALDO Rios [facility]   10/5/2022  9:00 AM Alexandru Michelle, PT [facility]   10/28/2022  2:40 PM Edie Gill NP [facility]  AMB

## 2022-10-03 ENCOUNTER — HOSPITAL ENCOUNTER (OUTPATIENT)
Dept: PHYSICAL THERAPY | Age: 57
Discharge: HOME | End: 2022-10-03
Attending: ORTHOPAEDIC SURGERY
Payer: OTHER GOVERNMENT

## 2022-10-03 PROCEDURE — 97110 THERAPEUTIC EXERCISES: CPT

## 2022-10-03 PROCEDURE — 97140 MANUAL THERAPY 1/> REGIONS: CPT

## 2022-10-03 NOTE — PROGRESS NOTES
PT DAILY TREATMENT NOTE     Patient Name: Daniela Frausto  Date:10/3/2022  : 1965  [x]  Patient  Verified  Payor:  / Plan: [facility] REGION / Product Type:  /    In time:9:00  Out time: 9:38  Total Treatment Time (min): 38  Visit #: 4 of 8    Treatment Area: Right shoulder pain [M25.511]    SUBJECTIVE  Pain Level (0-10 scale): 1/10  Any medication changes, allergies to medications, adverse drug reactions, diagnosis change, or new procedure performed?: [x] No    [] Yes (see summary sheet for update)  Subjective functional status/changes:   [] No changes reported  Patient stated that she has been cutting vegetables and painting arts and crafts at home. Patient assured therapist she is keeping her arm on the pillow and not lifting her arm.  Therapist again reinforced the importance of protecting right shoulder and not using it until cleared by MD.     OBJECTIVE    Modality rationale: Patient declined    Min Type Additional Details    [] Estim:  []Unatt       []IFC  []Premod                        []Other:  []w/ice   []w/heat  Position:  Location:    [] Estim: []Att    []TENS instruct  []NMES                    []Other:  []w/US   []w/ice   []w/heat  Position:  Location:    []  Traction: [] Cervical       []Lumbar                       [] Prone          []Supine                       []Intermittent   []Continuous Lbs:  [] before manual  [] after manual    []  Ultrasound: []Continuous   [] Pulsed                           []1MHz   []3MHz W/cm2:  Location:    []  Iontophoresis with dexamethasone         Location: [] Take home patch   [] In clinic    []  Ice     []  heat  []  Ice massage  []  Laser   []  Anodyne Position:  Location:    []  Laser with stim  []  Other:  Position:  Location:    []  Vasopneumatic Device    []  Right     []  Left  Pre-treatment girth:  Post-treatment girth:  Measured at (location):  Pressure:       [] lo [] med [] hi   Temperature: [] lo [] med [] hi   [] Skin assessment post-treatment:  []intact []redness- no adverse reaction    []redness - adverse reaction:       26 min Therapeutic Exercise:  [x] See flow sheet :   Rationale: increase ROM to improve the patients ability to perform ADs safely. 12 min Manual Therapy:  STM to right UT/levator scap; PROM with oscillations into flexion, scaption, and ER in POS    The manual therapy interventions were performed at a separate and distinct time from the therapeutic activities interventions. Rationale: decrease pain, increase ROM, and increase tissue extensibility to perform ADL safely. With   [] TE   [] TA   [] neuro   [] other: Patient Education: [x] Review HEP    [] Progressed/Changed HEP based on:   [] positioning   [] body mechanics   [] transfers   [] heat/ice application    [] other:      Other Objective/Functional Measures: Patient has met LTG #3 with report of 1/10 average pain level      Pain Level (0-10 scale) post treatment: 0/10     ASSESSMENT/Changes in Function: Therapist provided cues for correct technique and muscle activation with exercises. Increased reps per flow sheet to emphasize good control and endurance. Throughout session therapist continued to educate patient on protecting right shoulder as patient indicated multiple times she has been using her right arm to do different tasks. Patient stated her pain is minimal and she stops if pain increases. Patient stated she is following up with MD tomorrow. Patient reported no pain at end of the session. Patient will continue to benefit from skilled PT services to modify and progress therapeutic interventions, address functional mobility deficits, address ROM deficits, address strength deficits, analyze and address soft tissue restrictions, analyze and cue movement patterns, analyze and modify body mechanics/ergonomics, and assess and modify postural abnormalities to attain remaining goals.      []  See Plan of Care  []  See progress note/recertification  []  See Discharge Summary         Progress towards goals / Updated goals:  Short Term Goals: To be accomplished in 2 weeks:               1. Patient will be independent and compliant with HEP 1-2x/day to increase ease with ADls safely. Eval; HEP established   Current: Met 9/16/22 Pt reports understanding and compliance. Long Term Goals: To be accomplished in 4 weeks:               1. Patient will improve FOTO to at least 65 to demonstrate improved function. Eval; FOTO: 42               2. Patient will improve Right shoulder PROM flexion and scaption to 120deg to avoid detrimental effects of immobilization               Eval: Right shoulder PROM flexion and scaption: 90 deg               Current: Met 9/29/22 right shoulder PROM flexion 151 deg, scaption 150 deg               3. Patient will report average pain level no more then 2/10 to assist with return to PLOF. Eval: Pain level 4/10    Current: MET.  Patient reports average pain level is 1/10 (10/3/22)     PLAN  []  Upgrade activities as tolerated     [x]  Continue plan of care  []  Update interventions per flow sheet       []  Discharge due to:_  []  Other:_      Paula Dodge PT 10/3/2022  9:09 AM    Future Appointments   Date Time Provider Desirae Lucia   10/4/2022  1:45 PM REYNALDO Becker [facility] BS AMB   10/5/2022  9:00 [facility]   10/28/2022  2:40 PM Susie Werner NP [facility] BS AMB

## 2022-10-04 ENCOUNTER — OFFICE VISIT (OUTPATIENT)
Dept: ORTHOPEDIC SURGERY | Age: 57
End: 2022-10-04
Payer: OTHER GOVERNMENT

## 2022-10-04 DIAGNOSIS — M75.101 TEAR OF RIGHT ROTATOR CUFF, UNSPECIFIED TEAR EXTENT, UNSPECIFIED WHETHER TRAUMATIC: Primary | ICD-10-CM

## 2022-10-04 PROCEDURE — 99024 POSTOP FOLLOW-UP VISIT: CPT | Performed by: PHYSICIAN ASSISTANT

## 2022-10-04 NOTE — PATIENT INSTRUCTIONS
You may remove your sling in 1 week. You may then begin to move your arm on your own in 1 week. Continue with no lifting, pushing or pulling until you are seen again in 4 weeks  Remember nothing causes an increase in pain including physical therapy.

## 2022-10-04 NOTE — PROGRESS NOTES
Chiara Morel  1965     HISTORY OF PRESENT ILLNESS  Chiara Morel is a 59 y.o. female who presents today for evaluation s/p Right shoulder arthroscopic 3cm rotator cuff repair on 8/29. Patient has been going to PT. Her numbness in her tongue resolved without going to ENT. Describes pain as a 0/10. Patient denies any fever, chills, chest pain, shortness of breath or calf pain. The remainder of the review of systems is negative. There are no new illness or injuries to report since last seen in the office. No changes in medications, allergies, social or family history. PHYSICAL EXAM:   Visit Vitals  LMP 05/16/2014      The patient is a well-developed, well-nourished female in no acute distress. The patient is alert and oriented times three. The patient appears to be well groomed. Mood and affect are normal.  ORTHOPEDIC EXAM of right shoulder:  Inspection: swelling not present,  Bruising not present  Incisions well healed  Passive glenohumeral abduction 0-90 degrees  Stability: Stable  Strength: n/a  2+ distal pulses    IMPRESSION:  S/P Right shoulder arthroscopic 3cm rotator cuff repair    PLAN:   Patient improving post operatively  Will cont with PT. Start active assist now. Arom in 1 week. D/c sling in 1 week.  Stressed no increases in pain  RTC 4 weeks    NEYMAR Angel [facility]

## 2022-10-05 ENCOUNTER — HOSPITAL ENCOUNTER (OUTPATIENT)
Dept: PHYSICAL THERAPY | Age: 57
Discharge: HOME | End: 2022-10-05
Attending: ORTHOPAEDIC SURGERY
Payer: OTHER GOVERNMENT

## 2022-10-05 PROCEDURE — 97110 THERAPEUTIC EXERCISES: CPT

## 2022-10-05 NOTE — PROGRESS NOTES
PT DAILY TREATMENT NOTE     Patient Name: Lissy Mondragon  Date:10/5/2022  : 1965  [x]  Patient  Verified  Payor:  / Plan: [facility] REGION / Product Type:  /    In time: 9:00  Out time: 9:40  Total Treatment Time (min): 40  Visit #: 5 of 8    Treatment Area: Right shoulder pain [M25.511]    SUBJECTIVE  Pain Level (0-10 scale): 0/10   Any medication changes, allergies to medications, adverse drug reactions, diagnosis change, or new procedure performed?: [x] No    [] Yes (see summary sheet for update)  Subjective functional status/changes:   [] No changes reported  Patient stated she saw PA yesterday and was told she can begin to wean out of the sling. Patient was also given an updated script that indicated patient may begin AAROM.      OBJECTIVE    Modality rationale: Patient declined    Min Type Additional Details    [] Estim:  []Unatt       []IFC  []Premod                        []Other:  []w/ice   []w/heat  Position:  Location:    [] Estim: []Att    []TENS instruct  []NMES                    []Other:  []w/US   []w/ice   []w/heat  Position:  Location:    []  Traction: [] Cervical       []Lumbar                       [] Prone          []Supine                       []Intermittent   []Continuous Lbs:  [] before manual  [] after manual    []  Ultrasound: []Continuous   [] Pulsed                           []1MHz   []3MHz W/cm2:  Location:    []  Iontophoresis with dexamethasone         Location: [] Take home patch   [] In clinic    []  Ice     []  heat  []  Ice massage  []  Laser   []  Anodyne Position:  Location:    []  Laser with stim  []  Other:  Position:  Location:    []  Vasopneumatic Device    []  Right     []  Left  Pre-treatment girth:  Post-treatment girth:  Measured at (location):  Pressure:       [] lo [] med [] hi   Temperature: [] lo [] med [] hi   [] Skin assessment post-treatment:  []intact []redness- no adverse reaction    []redness - adverse reaction: 40 min Therapeutic Exercise:  [x] See flow sheet : PROM by therapist into flexion, scaption and ER in POS    Rationale: increase ROM to improve the patients ability to perform ADLs safely    With   [] TE   [] TA   [] neuro   [] other: Patient Education: [x] Review HEP    [] Progressed/Changed HEP based on:   [] positioning   [] body mechanics   [] transfers   [] heat/ice application    [] other:      Other Objective/Functional Measures: initiated AAROM exercise per updated script    Right shoulder AAROM (with finger ladder) flexion: 135deg (pain free)   Pain Level (0-10 scale) post treatment: 0/10     ASSESSMENT/Changes in Function: Therapist provided cues for correct technique and muscle activation with exercises. Advised patient to notify therapist if anything causes increased pain. Patient did very well with new exercises and reported her shoulder feeling better after performing the AAROM activities. PROM progressing well with no pain and no restriction noted at end feel. Patient reported no pain at end of the session. Patient will continue to benefit from skilled PT services to modify and progress therapeutic interventions, address functional mobility deficits, address ROM deficits, address strength deficits, analyze and address soft tissue restrictions, analyze and cue movement patterns, analyze and modify body mechanics/ergonomics, and assess and modify postural abnormalities to attain remaining goals. []  See Plan of Care  []  See progress note/recertification  []  See Discharge Summary         Progress towards goals / Updated goals:  Short Term Goals: To be accomplished in 2 weeks:               1. Patient will be independent and compliant with HEP 1-2x/day to increase ease with ADls safely. Eval; HEP established   Current: Met 9/16/22 Pt reports understanding and compliance. Long Term Goals:  To be accomplished in 4 weeks:               1. Patient will improve FOTO to at least 65 to demonstrate improved function. Eval; FOTO: 42               2. Patient will improve Right shoulder PROM flexion and scaption to 120deg to avoid detrimental effects of immobilization               Eval: Right shoulder PROM flexion and scaption: 90 deg               Current: Met 9/29/22 right shoulder PROM flexion 151 deg, scaption 150 deg               3. Patient will report average pain level no more then 2/10 to assist with return to PLOF. Eval: Pain level 4/10               Current: MET.  Patient reports average pain level is 1/10 (10/3/22)        PLAN  []  Upgrade activities as tolerated     [x]  Continue plan of care  []  Update interventions per flow sheet       []  Discharge due to:_  []  Other:_      Yoan Castro, PT 10/5/2022  9:13 AM    Future Appointments   Date Time Provider Desirae Lcuia   10/28/2022  2:40 PM Skyler Lugo NP [facility] BS AMB   11/11/2022  1:00 PM REYNALDO Butler [facility] BS AMB

## 2022-10-26 ENCOUNTER — HOSPITAL ENCOUNTER (OUTPATIENT)
Dept: PHYSICAL THERAPY | Age: 57
Discharge: HOME | End: 2022-10-26
Attending: ORTHOPAEDIC SURGERY
Payer: OTHER GOVERNMENT

## 2022-10-26 PROCEDURE — 97530 THERAPEUTIC ACTIVITIES: CPT

## 2022-10-26 PROCEDURE — 97140 MANUAL THERAPY 1/> REGIONS: CPT

## 2022-10-26 PROCEDURE — 97110 THERAPEUTIC EXERCISES: CPT

## 2022-10-26 NOTE — PROGRESS NOTES
[facility]  [address]  [place], [address]  [phone] fax    Physical Therapy Progress Note  Patient name: Clementine Miranda Start of Care: 2022   Referral source: Gerald Lawrence,* : 1965                Medical Diagnosis: Tear of right rotator cuff, unspecified tear extent, unspecified whether traumatic [M75.101]  Payor:  / Plan: [employer] [facility] / Product Type:  /  Onset Date:    22   Treatment Diagnosis: Right shoulder pain   Prior Hospitalization: see medical history Provider#: [id number]   Medications: Verified on Patient summary List    Comorbidities: Patient confirmed: Allergies, Arthritis, Asthma, BMI over 30, HA, Prior surgery, Sleep dysfunction, tobacco use   Visits from Start of Care: 7    Missed Visits: 2      Established Goals:        Excellent         Good         Limited            None  [] Increased ROM   []  [x]  []  []  [] Increased Strength  []  [x]  []  []  [] Increased Mobility  []  [x]  []  []   [] Decreased Pain   [x]  []  []  []  [] Decreased Swelling  []  []  []  [x]    Key Functional Changes: donning/doffing clothes, brushing teeth and hair    Updated Goals: to be achieved in 4 weeks: To be accomplished in 4 weeks:               1. Patient will improve FOTO to at least 65 to demonstrate improved function. status at PN: FOTO = 59                 2. Patient will improve Right shoulder AROM flexion and scaption to 120deg to increase ease with overhead activities. Status at PN: right shoulder PROM flexion 151 deg, scaption 150 deg                 3. Patient will report ability to lift 2# into overhead cabinet x 5 repetitions to improve ease of ADL completion. ASSESSMENT/RECOMMENDATIONS:Ms. Kizzy Hernandez has shown steady progress since beginning treatment within our clinic, reporting decreased pain levels and meeting her current range of motion goals.  While she reports improved completion of ADLs such as donning and doffing clothing and brushing her teeth and hair, she continues to be challenged by repetitive, prolonged tasks overhead, as is to be expected at this point within her surgical protocol. She states that due to financial concerns that she would prefer to attend PT once a week. Patient would continue to benefit from skilled PT intervention to continue to increase scapular strength, eccentric control, and stability to return to PLOF according to her surgical protocol guidelines. [x]Continue therapy per initial plan/protocol at a frequency of  1 x per week for 4 weeks  []Continue therapy with the following recommended changes:_____________________      _____________________________________________________________________  []Discontinue therapy progressing towards or have reached established goals  []Discontinue therapy due to lack of appreciable progress towards goals  []Discontinue therapy due to lack of attendance or compliance  []Await Physician's recommendations/decisions regarding therapy  []Other:________________________________________________________________    Thank you for this referral.    Judge Skyler PTA 10/26/2022 11:55 AM  NOTE TO PHYSICIAN:  PLEASE COMPLETE THE ORDERS BELOW AND   FAX TO [facility]: [phone]  If you are unable to process this request in 24 hours please contact our office: [phone]    I have read the above report and request that my patient continue as recommended. I have read the above report and request that my patient continue therapy with the following changes/special instructions:__________________________________________________________  I have read the above report and request that my patient be discharged from therapy.     Physicians signature: ______________________________Date: ______Time:______     Susan Rodriguez,*

## 2022-10-26 NOTE — PROGRESS NOTES
PT DAILY TREATMENT NOTE     Patient Name: Denisha Khalil  Date:10/26/2022  : 1965  [x]  Patient  Verified  Payor:  / Plan: [facility] REGION / Product Type:  /    In time:1100  Out time:1150  Total Treatment Time (min): 50  Visit #: 1 of 8      Treatment Area: Right shoulder pain [M25.511]    SUBJECTIVE  Pain Level (0-10 scale): 0  Any medication changes, allergies to medications, adverse drug reactions, diagnosis change, or new procedure performed?: [x] No    [] Yes (see summary sheet for update)  Subjective functional status/changes:   [] No changes reported  Patient reports that she has been painting and noticed that she needed to assist her right arm with her left after prolonged painting. She states that she continues to take Naproxen PRN. OBJECTIVE      15 min Therapeutic Exercise:  [x] See flow sheet :   Rationale: increase ROM and increase strength to improve the patients ability to complete ADLs with ease. 25 min Therapeutic Activity:  [x]  See flow sheet : foto and functional reassessments, functional gripping   Rationale: increase ROM, increase strength, improve coordination, and increase proprioception  to improve the patients ability to complete functional activities. 10 min Manual Therapy:  PROM into right flexion,right  abduction, and right ER with patient supine throughout. The manual therapy interventions were performed at a separate and distinct time from the therapeutic activities interventions. Rationale: decrease pain, increase ROM, and increase tissue extensibility to improve functional mobility.              With   [x] TE   [] TA   [] neuro   [] other: Patient Education: [x] Review HEP    [] Progressed/Changed HEP based on:   [] positioning   [] body mechanics   [] transfers   [] heat/ice application    [] other:      Other Objective/Functional Measures:   -FoTO = 59    -increased reps of cervical and wrist AROM, finger ladder    -increased time under tension during gripper    -patient able to complete wall slides into flexion without UE assistance    Pain Level (0-10 scale) post treatment: 0    ASSESSMENT/Changes in Function: Ms. Jacob Kilgore has shown steady progress since beginning treatment within our clinic, reporting decreased pain levels and meeting her current range of motion goals. While she reports improved completion of ADLs such as donning and doffing clothing and brushing her teeth and hair, she continues to be challenged by repetitive, prolonged tasks overhead, as is to be expected at this point within her surgical protocol. She states that due to financial concerns that she would prefer to attend PT once a week. Patient would continue to benefit from skilled PT intervention to continue to increase scapular strength, eccentric control, and stability to return to PLOF according to her surgical protocol guidelines. Patient will continue to benefit from skilled PT services to modify and progress therapeutic interventions, address functional mobility deficits, address ROM deficits, address strength deficits, analyze and address soft tissue restrictions, analyze and cue movement patterns, analyze and modify body mechanics/ergonomics, assess and modify postural abnormalities, and instruct in home and community integration to attain remaining goals. []  See Plan of Care  [x]  See progress note/recertification  []  See Discharge Summary         Progress towards goals / Updated goals:  Short Term Goals: To be accomplished in 2 weeks:               1. Patient will be independent and compliant with HEP 1-2x/day to increase ease with ADls safely. Eval; HEP established   Current: Met 9/16/22 Pt reports understanding and compliance. Long Term Goals: To be accomplished in 4 weeks:               1. Patient will improve FOTO to at least 65 to demonstrate improved function.                Eval; FOTO: 42  Current: progressing, FOTO = 59               2. Patient will improve Right shoulder PROM flexion and scaption to 120deg to avoid detrimental effects of immobilization               Eval: Right shoulder PROM flexion and scaption: 90 deg               Current: Met 9/29/22 right shoulder PROM flexion 151 deg, scaption 150 deg               3. Patient will report average pain level no more then 2/10 to assist with return to PLOF. Eval: Pain level 4/10               Current: MET.  Patient reports average pain level is 1/10 (10/3/22)     Functional Gains: brushing teeth and hair, donning clothes  Functional Deficits: lifting a weight overhead, especially during the eccentric portion of the movement with weight, prolonged/repetitive motions  % improvement: 60%  Pain   Average: 0/10       Best: 0/10     Worst: 0/10  Patient Goal: \"overhead lifting/muscular endurance\"      PLAN  [x]  Upgrade activities as tolerated     []  Continue plan of care  []  Update interventions per flow sheet       []  Discharge due to:_  []  Other:_      Renetta Coy PTA 10/26/2022  7:54 AM    Future Appointments   Date Time Provider Desirae Lucia   10/26/2022 11:00 AM Magdi Mora PTA [id number]   10/28/2022  2:40 PM Watson Christianson NP [facility] BS AMB   11/11/2022  1:00 PM REYNALDO Resendiz [facility] BS AMB

## 2022-10-28 ENCOUNTER — OFFICE VISIT (OUTPATIENT)
Dept: FAMILY MEDICINE CLINIC | Age: 57
End: 2022-10-28
Payer: OTHER GOVERNMENT

## 2022-10-28 VITALS
TEMPERATURE: 97.1 F | DIASTOLIC BLOOD PRESSURE: 86 MMHG | OXYGEN SATURATION: 97 % | RESPIRATION RATE: 18 BRPM | SYSTOLIC BLOOD PRESSURE: 130 MMHG | WEIGHT: 172 LBS | HEIGHT: 61 IN | BODY MASS INDEX: 32.47 KG/M2 | HEART RATE: 93 BPM

## 2022-10-28 DIAGNOSIS — K21.9 GASTROESOPHAGEAL REFLUX DISEASE, UNSPECIFIED WHETHER ESOPHAGITIS PRESENT: Primary | ICD-10-CM

## 2022-10-28 DIAGNOSIS — E78.2 MIXED HYPERLIPIDEMIA: ICD-10-CM

## 2022-10-28 DIAGNOSIS — Z23 ENCOUNTER FOR IMMUNIZATION: ICD-10-CM

## 2022-10-28 DIAGNOSIS — I10 ESSENTIAL HYPERTENSION: ICD-10-CM

## 2022-10-28 PROCEDURE — 3074F SYST BP LT 130 MM HG: CPT | Performed by: NURSE PRACTITIONER

## 2022-10-28 PROCEDURE — 99214 OFFICE O/P EST MOD 30 MIN: CPT | Performed by: NURSE PRACTITIONER

## 2022-10-28 PROCEDURE — 90471 IMMUNIZATION ADMIN: CPT | Performed by: NURSE PRACTITIONER

## 2022-10-28 PROCEDURE — 90686 IIV4 VACC NO PRSV 0.5 ML IM: CPT | Performed by: NURSE PRACTITIONER

## 2022-10-28 PROCEDURE — 3078F DIAST BP <80 MM HG: CPT | Performed by: NURSE PRACTITIONER

## 2022-10-28 RX ORDER — ROSUVASTATIN CALCIUM 5 MG/1
5 TABLET, COATED ORAL
Qty: 90 TABLET | Refills: 3 | Status: SHIPPED | OUTPATIENT
Start: 2022-10-28

## 2022-10-28 RX ORDER — ALBUTEROL SULFATE 90 UG/1
2 AEROSOL, METERED RESPIRATORY (INHALATION)
Qty: 1 EACH | Refills: 6 | Status: SHIPPED | OUTPATIENT
Start: 2022-10-28

## 2022-10-28 RX ORDER — ALBUTEROL SULFATE 0.83 MG/ML
2.5 SOLUTION RESPIRATORY (INHALATION)
Qty: 2 EACH | Refills: 3 | Status: SHIPPED | OUTPATIENT
Start: 2022-10-28

## 2022-10-28 RX ORDER — DIPHENHYDRAMINE HCL 25 MG
25 TABLET ORAL
Qty: 30 TABLET | Refills: 3 | Status: SHIPPED | OUTPATIENT
Start: 2022-10-28

## 2022-10-28 RX ORDER — NAPROXEN 500 MG/1
500 TABLET ORAL
Qty: 60 TABLET | Refills: 1 | Status: CANCELLED | OUTPATIENT
Start: 2022-10-28

## 2022-10-28 RX ORDER — ONDANSETRON 8 MG/1
8 TABLET, ORALLY DISINTEGRATING ORAL
Qty: 20 TABLET | Refills: 0 | Status: SHIPPED | OUTPATIENT
Start: 2022-10-28

## 2022-10-28 RX ORDER — OMEPRAZOLE 20 MG/1
20 CAPSULE, DELAYED RELEASE ORAL DAILY
Qty: 90 CAPSULE | Refills: 1 | Status: SHIPPED | OUTPATIENT
Start: 2022-10-28

## 2022-10-28 RX ORDER — ACETAMINOPHEN 500 MG
500 TABLET ORAL
Qty: 60 TABLET | Refills: 2 | Status: SHIPPED | OUTPATIENT
Start: 2022-10-28

## 2022-10-28 RX ORDER — MONTELUKAST SODIUM 10 MG/1
10 TABLET ORAL
Qty: 90 TABLET | Refills: 3 | Status: SHIPPED | OUTPATIENT
Start: 2022-10-28

## 2022-10-28 RX ORDER — FLUTICASONE PROPIONATE AND SALMETEROL 250; 50 UG/1; UG/1
1 POWDER RESPIRATORY (INHALATION) EVERY 12 HOURS
Qty: 3 EACH | Refills: 3 | Status: SHIPPED | OUTPATIENT
Start: 2022-10-28

## 2022-10-28 RX ORDER — BUTALBITAL, ACETAMINOPHEN AND CAFFEINE 50; 325; 40 MG/1; MG/1; MG/1
1 TABLET ORAL
Qty: 40 TABLET | Refills: 1 | Status: SHIPPED | OUTPATIENT
Start: 2022-10-28

## 2022-10-28 RX ORDER — IBUPROFEN 800 MG/1
800 TABLET ORAL
Qty: 60 TABLET | Refills: 2 | Status: SHIPPED | OUTPATIENT
Start: 2022-10-28

## 2022-10-28 NOTE — PROGRESS NOTES
1. \"Have you been to the ER, urgent care clinic since your last visit? Hospitalized since your last visit? \" No    2. \"Have you seen or consulted any other health care providers outside of the [address] [place] [address] since your last visit? \" No     3. For patients aged 39-70: Has the patient had a colonoscopy / FIT/ Cologuard? No      If the patient is female:    4. For patients aged 41-77: Has the patient had a mammogram within the past 2 years? Yes - Care Gap present. Most recent result on file      5. For patients aged 21-65: Has the patient had a pap smear?  No

## 2022-11-01 ENCOUNTER — HOSPITAL ENCOUNTER (OUTPATIENT)
Dept: PHYSICAL THERAPY | Age: 57
Discharge: HOME | End: 2022-11-01
Attending: ORTHOPAEDIC SURGERY
Payer: OTHER GOVERNMENT

## 2022-11-01 PROCEDURE — 97110 THERAPEUTIC EXERCISES: CPT

## 2022-11-01 PROCEDURE — 97530 THERAPEUTIC ACTIVITIES: CPT

## 2022-11-01 PROCEDURE — 97140 MANUAL THERAPY 1/> REGIONS: CPT

## 2022-11-01 NOTE — PROGRESS NOTES
PT DAILY TREATMENT NOTE     Patient Name: Denisha Khalil  Date:2022  : 1965  [x]  Patient  Verified  Payor:  / Plan: Tone Madrigal 74 / Product Type:  /    In time:1114  Out time:1154  Total Treatment Time (min): 40  Visit #: 2 of 8    Treatment Area: Right shoulder pain [M25.511]    SUBJECTIVE  Pain Level (0-10 scale): 0  Any medication changes, allergies to medications, adverse drug reactions, diagnosis change, or new procedure performed?: [x] No    [] Yes (see summary sheet for update)  Subjective functional status/changes:   [] No changes reported  Patient reports that she felt \"great\" after her last visit and was able to cook without difficulty. OBJECTIVE      23 min Therapeutic Exercise:  [x] See flow sheet :   Rationale: increase ROM and increase strength to improve the patients ability to complete ADLs with ease. 9 min Therapeutic Activity:  [x]  See flow sheet :functional gripping and reaching   Rationale: increase ROM, increase strength, improve coordination, and increase proprioception  to improve the patients ability to complete functional activities with ease. 8 min Manual Therapy:  PROM into right flexion, right abduction, and right ER with patient supine throughout. The manual therapy interventions were performed at a separate and distinct time from the therapeutic activities interventions. Rationale: decrease pain, increase ROM, increase tissue extensibility, and decrease trigger points to improve functional mobility.              With   [x] TE   [] TA   [] neuro   [] other: Patient Education: [x] Review HEP    [] Progressed/Changed HEP based on:   [] positioning   [] body mechanics   [] transfers   [] heat/ice application    [] other:      Other Objective/Functional Measures:   -added bicep AROM with load and wall slides into scaption    -added load to wrist AROM     -increased time under tension during gripper, wall slides into flexion, table slides    -increased VC/Tc required today to decrease UT  and trunk leaning compensation during pulleys    -progressed wand flexion to standing     Pain Level (0-10 scale) post treatment: 0    ASSESSMENT/Changes in Function: Today's session focused on increased scapular strength, mobility, and kinesthetic awareness. AROM elbow flexion initiated with load; patient reported no adverse responses throughout activities. Continue to progress POC, progressing strengthening according to referral.     Patient will continue to benefit from skilled PT services to modify and progress therapeutic interventions, address functional mobility deficits, address ROM deficits, address strength deficits, analyze and address soft tissue restrictions, analyze and cue movement patterns, analyze and modify body mechanics/ergonomics, assess and modify postural abnormalities, and instruct in home and community integration to attain remaining goals. []  See Plan of Care  [x]  See progress note/recertification  []  See Discharge Summary         Progress towards goals / Updated goals: To be accomplished in 4 weeks:               1. Patient will improve FOTO to at least 65 to demonstrate improved function. status at PN: FOTO = 59   Assess at end of PN               2. Patient will improve Right shoulder AROM flexion and scaption to 120deg to increase ease with overhead activities. Status at PN: right shoulder PROM flexion 151 deg, scaption 150 deg                  3. Patient will report ability to lift 2# into overhead cabinet x 5 repetitions to improve ease of ADL completion.         PLAN  [x]  Upgrade activities as tolerated     []  Continue plan of care  []  Update interventions per flow sheet       []  Discharge due to:_  []  Other:_      Lynn Granda PTA 11/1/2022  8:12 AM    Future Appointments   Date Time Provider Desirae Lucia   11/1/2022 11:15 AM Travis Gutierrez PTA [facility] 11/8/2022 11:15 AM Zohra Costa, PT [facility]   11/11/2022  1:00 PM REYNALDO Sainz [facility]   11/15/2022 11:15 AM Zohra Costa, PT [facility]   11/22/2022 11:15 AM Zohra Costa, PT [facility]   11/29/2022  8:20 AM Priya Luque, [facility] BS AMB

## 2022-11-07 NOTE — PROGRESS NOTES
Jose Angel Vieyra is a 59 y.o. female who was seen in clinic today (10/28/2022) for Other (Discuss breast reduction and tummy tuck )    Assessment & Plan:   Diagnoses and all orders for this visit:    1. Gastroesophageal reflux disease, unspecified whether esophagitis present  -     REFERRAL TO GASTROENTEROLOGY    2. Encounter for immunization  -     INFLUENZA, FLUARIX, FLULAVAL, FLUZONE (AGE 6 MO+), AFLURIA(AGE 3Y+) IM, PF, 0.5 ML    3. Mixed hyperlipidemia    4. Essential hypertension    Other orders  -     acetaminophen (Tylenol Extra Strength) 500 mg tablet; Take 1 Tablet by mouth every six (6) hours as needed for Pain (not to exceed 4 tabs in 24 hrs). -     butalbital-acetaminophen-caffeine (FIORICET, ESGIC) -40 mg per tablet; Take 1 Tablet by mouth every six (6) hours as needed (for pain or headache). Not to exceed 4 tabs in 24 hrs  -     ondansetron (ZOFRAN ODT) 8 mg disintegrating tablet; Take 1 Tablet by mouth every twelve (12) hours as needed for Nausea. -     rosuvastatin (CRESTOR) 5 mg tablet; Take 1 Tablet by mouth nightly. -     montelukast (SINGULAIR) 10 mg tablet; Take 1 Tablet by mouth nightly. -     diphenhydrAMINE (Benadryl Allergy) 25 mg tablet; Take 1 Tablet by mouth every six (6) hours as needed (allergies). -     fluticasone propion-salmeteroL (ADVAIR/WIXELA) 250-50 mcg/dose diskus inhaler; Take 1 Puff by inhalation every twelve (12) hours. -     albuterol (PROVENTIL HFA, VENTOLIN HFA, PROAIR HFA) 90 mcg/actuation inhaler; Take 2 Puffs by inhalation every four (4) hours as needed for Wheezing or Shortness of Breath. -     albuterol (PROVENTIL VENTOLIN) 2.5 mg /3 mL (0.083 %) nebu; 3 mL by Nebulization route every four (4) hours as needed for Wheezing or Shortness of Breath. -     ibuprofen (MOTRIN) 800 mg tablet; Take 1 Tablet by mouth every eight (8) hours as needed for Pain (take with food). -     omeprazole (PRILOSEC) 20 mg capsule;  Take 1 Capsule by mouth daily.    Medications refilled as per request  Begin omeprazole for GERD, reinforced GERD diet    Follow-up and Dispositions    Return in about 4 weeks (around 11/25/2022), or if symptoms worsen or fail to improve, for GERD, virtual follow up. Subjective:   GERD  Patient complains of gastroesophageal reflux with abdominal bloating, belching, difficulty swallowing, heartburn, nausea, and regurgitation of undigested food. Patient also reports she has been experiencing increased mucus production. She has had mild dysphagia for solids. She  has not lost weight. She denies melena, hematochezia, hematemesis, and coffee ground emesis. She denies bilious reflux, chest pain, deep pressure at base of neck, hoarseness, laryngitis, need to clear throat frequently, nocturnal burning, shortness of breath, and wheezing. Medical therapy in the past has included OTC H2 antagonists, antacids, drinking sprite with eating. Breast reduction: requesting an updated referral for surgeon. Per patient she was informed to return when she has reached her ideal weight. She does continue to have back pain due to breast.  Patient also requesting referral to provider for an abdominal plasty. Patient advised to find a provider in network with her insurance and referral will be placed. Patient agreeable. Discussed previously lab results in detail. Lab Results   Component Value Date/Time    Sodium 140 08/23/2022 08:11 AM    Potassium 3.8 08/23/2022 08:11 AM    Chloride 108 08/23/2022 08:11 AM    CO2 29 08/23/2022 08:11 AM    Anion gap 3 08/23/2022 08:11 AM    Glucose 80 08/23/2022 08:11 AM    BUN 13 08/23/2022 08:11 AM    Creatinine 0.76 08/23/2022 08:11 AM    BUN/Creatinine ratio 17 08/23/2022 08:11 AM    GFR est AA >60 08/23/2022 08:11 AM    GFR est non-AA >60 08/23/2022 08:11 AM    Calcium 9.1 08/23/2022 08:11 AM    Bilirubin, total 0.5 07/08/2022 09:08 AM    Alk.  phosphatase 108 07/08/2022 09:08 AM    Protein, total 6.9 07/08/2022 09:08 AM    Albumin 4.0 07/08/2022 09:08 AM    Globulin 2.9 07/08/2022 09:08 AM    A-G Ratio 1.4 07/08/2022 09:08 AM    ALT (SGPT) 15 07/08/2022 09:08 AM    AST (SGOT) 16 07/08/2022 09:08 AM     Lab Results   Component Value Date/Time    Cholesterol, total 169 07/08/2022 09:08 AM    HDL Cholesterol 60 07/08/2022 09:08 AM    LDL, calculated 96 07/08/2022 09:08 AM    VLDL, calculated 13 07/08/2022 09:08 AM    Triglyceride 65 07/08/2022 09:08 AM    CHOL/HDL Ratio 2.8 07/08/2022 09:08 AM     Lab Results   Component Value Date/Time    Hemoglobin A1c 5.9 (H) 07/08/2022 09:08 AM     No results found for: Cheryle Constable, [id number]    Lab Results   Component Value Date/Time    WBC 6.4 08/23/2022 08:11 AM    HGB 10.5 (L) 08/23/2022 08:11 AM    HCT 34.7 (L) 08/23/2022 08:11 AM    PLATELET 930 99/23/6679 08:11 AM    MCV 83.6 08/23/2022 08:11 AM       Wt Readings from Last 3 Encounters:   10/28/22 172 lb (78 kg)   08/19/22 173 lb (78.5 kg)   08/03/22 180 lb (81.6 kg)     Temp Readings from Last 3 Encounters:   10/28/22 97.1 °F (36.2 °C) (Temporal)   08/19/22 97.8 °F (36.6 °C)   08/03/22 98 °F (36.7 °C) (Temporal)     BP Readings from Last 3 Encounters:   10/28/22 130/86   05/03/22 124/88   12/16/21 110/78     Pulse Readings from Last 3 Encounters:   10/28/22 93   05/03/22 83   12/16/21 98       Prior to Admission medications    Medication Sig Start Date End Date Taking? Authorizing Provider   acetaminophen (Tylenol Extra Strength) 500 mg tablet Take 1 Tablet by mouth every six (6) hours as needed for Pain (not to exceed 4 tabs in 24 hrs). 10/28/22  Yes Edie Gill NP   butalbital-acetaminophen-caffeine (FIORICET, ESGIC) -40 mg per tablet Take 1 Tablet by mouth every six (6) hours as needed (for pain or headache).  Not to exceed 4 tabs in 24 hrs 10/28/22  Yes Edie Gill NP   ondansetron (ZOFRAN ODT) 8 mg disintegrating tablet Take 1 Tablet by mouth every twelve (12) hours as needed for Nausea. 10/28/22  Yes Susie Werner NP   rosuvastatin (CRESTOR) 5 mg tablet Take 1 Tablet by mouth nightly. 10/28/22  Yes Bridget MARTINEZ NP   montelukast (SINGULAIR) 10 mg tablet Take 1 Tablet by mouth nightly. 10/28/22  Yes Susie Werner NP   diphenhydrAMINE (Benadryl Allergy) 25 mg tablet Take 1 Tablet by mouth every six (6) hours as needed (allergies). 10/28/22  Yes Susie Werner NP   fluticasone propion-salmeteroL (ADVAIR/WIXELA) 250-50 mcg/dose diskus inhaler Take 1 Puff by inhalation every twelve (12) hours. 10/28/22  Yes Susie Werner NP   albuterol (PROVENTIL HFA, VENTOLIN HFA, PROAIR HFA) 90 mcg/actuation inhaler Take 2 Puffs by inhalation every four (4) hours as needed for Wheezing or Shortness of Breath. 10/28/22  Yes Susie Werner NP   albuterol (PROVENTIL VENTOLIN) 2.5 mg /3 mL (0.083 %) nebu 3 mL by Nebulization route every four (4) hours as needed for Wheezing or Shortness of Breath. 10/28/22  Yes Susie Werner NP   ibuprofen (MOTRIN) 800 mg tablet Take 1 Tablet by mouth every eight (8) hours as needed for Pain (take with food). 10/28/22  Yes Susie Werner NP   omeprazole (PRILOSEC) 20 mg capsule Take 1 Capsule by mouth daily. 10/28/22  Yes Susie Werner NP   naproxen (NAPROSYN) 500 mg tablet Take 1 Tablet by mouth two (2) times daily as needed for Pain. 7/6/22  Yes Susie Werner NP   amitriptyline (ELAVIL) 25 mg tablet Take 1 tab po QHS, may increase to 2 tabs po QHS 3/23/21  Yes Provider, Historical   erenumab-aooe (Aimovig Autoinjector) 140 mg/mL injection 140 mg by SubCUTAneous route. 3/23/21  Yes Provider, Historical   Nebulizer & Compressor machine 1 Each by Does Not Apply route every four (4) hours as needed. 5/1/13  Yes Susie Werner NP   diclofenac EC (VOLTAREN) 75 mg EC tablet Take 1 Tablet by mouth two (2) times daily (with meals).   Patient not taking: Reported on 10/28/2022 8/19/22   REYNALDO Becker   meloxicam (MOBIC) 7.5 mg tablet Take 1 Tablet by mouth in the morning. Patient not taking: Reported on 10/28/2022 8/8/22   REYNALDO Ratliff   polyethylene glycol [facility]) 17 gram/dose powder Take 17 g by mouth daily. Patient not taking: Reported on 10/28/2022 7/6/22   Kristin Flores NP   hydrocortisone-pramoxine ([facility] rectal foam Insert 1 Applicator into rectum two (2) times a day. Patient not taking: Reported on 10/28/2022 5/6/22   Clydene Beverage T, NP   naratriptan (AMERGE) 2.5 mg tab Take 1 tab po at the onset of a migraine. Ma repeat once in 2 hours. Max 2 tabs/day. Patient not taking: Reported on 10/28/2022 3/23/21   Provider, Historical   oxyCODONE-acetaminophen (PERCOCET) 5-325 mg per tablet TAKE 1 TABLET BY MOUTH EVERY 8 HOURS AS NEEDED FOR 7 DAYS  Patient not taking: No sig reported 4/12/21   Provider, Historical   cloNIDine (CATAPRES) 0.1 mg/24 hr ptwk 1 Patch by TransDERmal route every seven (7) days. Patient not taking: No sig reported 4/22/21   Kristin Flores NP   Transparent Dressings (Tegaderm Frame Style) 2 3/8 X 2 3/4 \" bndg To use with clonidine patch weekly  Patient not taking: No sig reported 10/21/20   Kristin Flores NP   ospemifene (OSPHENA) 60 mg tab tablet Take 1 Tab by mouth daily. Patient not taking: No sig reported 6/25/20   Johnson French MD   loratadine (CLARITIN REDITABS) 10 mg dissolvable tablet Take 1 Tab by mouth daily. Patient not taking: No sig reported 4/26/19   Kristin Flores NP     The following sections were reviewed & updated as appropriate: PMH, PSH, FH, and SH. Review of Systems   Constitutional:  Negative for activity change, appetite change, chills, fatigue and fever. Respiratory:  Negative for chest tightness and shortness of breath. Cardiovascular:  Negative for chest pain. Gastrointestinal:  Positive for nausea. Negative for abdominal pain and constipation. Musculoskeletal:  Positive for back pain (upper back). Neurological:  Negative for dizziness and headaches.         Objective: Visit Vitals  /86 (BP 1 Location: Left upper arm, BP Patient Position: Sitting, BP Cuff Size: Large adult)   Pulse 93   Temp 97.1 °F (36.2 °C) (Temporal)   Resp 18   Ht 5' 1\" (1.549 m)   Wt 172 lb (78 kg)   LMP 05/16/2014   SpO2 97%   BMI 32.50 kg/m²      Physical Exam  Constitutional:       General: She is not in acute distress. Appearance: She is well-developed. HENT:      Head: Normocephalic and atraumatic. Neck:      Vascular: No carotid bruit. Cardiovascular:      Rate and Rhythm: Normal rate and regular rhythm. Heart sounds: Normal heart sounds. No murmur heard. No friction rub. No gallop. Pulmonary:      Effort: Pulmonary effort is normal.      Breath sounds: Normal breath sounds. No decreased breath sounds, wheezing, rhonchi or rales. Abdominal:      General: Bowel sounds are normal.      Palpations: Abdomen is soft. Tenderness: There is no abdominal tenderness. Musculoskeletal:      Cervical back: Normal range of motion and neck supple. Lymphadenopathy:      Cervical: No cervical adenopathy. Skin:     General: Skin is warm and dry. Neurological:      Mental Status: She is alert and oriented to person, place, and time. Disclaimer: The patient understands our medical plan. Alternatives have been explained and offered. The risks, benefits and significant side effects of all medications have been reviewed. Anticipated time course and progression of condition reviewed. All questions have been addressed. She is encouraged to employ the information provided in the after visit summary, which was reviewed. Where applicable, she is instructed to call the clinic if she has not been notified either by phone or through [address] with the results of her tests or with an appointment plan for any referrals within 1 week(s). No news is not good news; it's no news.  The patient  is to call if her condition worsens or fails to improve or if significant side effects are experienced. Aspects of this note may have been generated using voice recognition software. Despite editing, there may be unrecognized errors.        Nat Hall NP

## 2022-11-08 ENCOUNTER — HOSPITAL ENCOUNTER (OUTPATIENT)
Dept: PHYSICAL THERAPY | Age: 57
Discharge: HOME | End: 2022-11-08
Attending: ORTHOPAEDIC SURGERY
Payer: OTHER GOVERNMENT

## 2022-11-08 PROCEDURE — 97530 THERAPEUTIC ACTIVITIES: CPT

## 2022-11-08 PROCEDURE — 97110 THERAPEUTIC EXERCISES: CPT

## 2022-11-08 PROCEDURE — 97140 MANUAL THERAPY 1/> REGIONS: CPT

## 2022-11-08 NOTE — PROGRESS NOTES
PT DAILY TREATMENT NOTE     Patient Name: Teresa Ruiz  Date:2022  : 1965  [x]  Patient  Verified  Payor: DELMY / Plan: Tone Madrigal 74 / Product Type:  /    In time:1115  Out time:1154  Total Treatment Time (min): 39  Visit #: 3 of 8    Treatment Area: Right shoulder pain [M25.511]    SUBJECTIVE  Pain Level (0-10 scale): 0/10  Any medication changes, allergies to medications, adverse drug reactions, diagnosis change, or new procedure performed?: [x] No    [] Yes (see summary sheet for update)  Subjective functional status/changes:   [] No changes reported  Pt reports she is doing well today, has some muscle cramping towards the end of the day but is able to work it out with some massage / lotion. OBJECTIVE    22 min Therapeutic Exercise:  [x] See flow sheet :   Rationale: increase ROM and increase strength to improve the patients ability to complete ADLs with ease. 9 min Therapeutic Activity:  [x]  See flow sheet :functional gripping and reaching   Rationale: increase ROM, increase strength, improve coordination, and increase proprioception  to improve the patients ability to complete functional activities with ease. 8 min Manual Therapy:  PROM into right flexion, right abduction, and right ER; IR/ER isometrics and GH perturbations with patient supine throughout. The manual therapy interventions were performed at a separate and distinct time from the therapeutic activities interventions. Rationale: decrease pain, increase ROM, increase tissue extensibility, and decrease trigger points to improve functional mobility.            With   [x] TE   [] TA   [] neuro   [] other: Patient Education: [x] Review HEP    [] Progressed/Changed HEP based on:   [] positioning   [] body mechanics   [] transfers   [] heat/ice application    [] other:      Other Objective/Functional Measures:   Progressed to light resistance theraband rows and extension to improve periscapular strengthening; added IR/ER isometrics during manuals today   Increased repetitions and resistance per flow sheet    Pain Level (0-10 scale) post treatment: 0/10    ASSESSMENT/Changes in Function: Session continued to focus on R shoulder ROM, mobility and gentle strength strength progressions to improve overall function per referral. Pt displays no adverse reactions throughout session and appears to be healing routinely. Continue to progress POC, progressing strengthening according to referral.     Patient will continue to benefit from skilled PT services to modify and progress therapeutic interventions, address functional mobility deficits, address ROM deficits, address strength deficits, analyze and address soft tissue restrictions, analyze and cue movement patterns, analyze and modify body mechanics/ergonomics, assess and modify postural abnormalities, and instruct in home and community integration to attain remaining goals. []  See Plan of Care  []  See progress note/recertification  []  See Discharge Summary         Progress towards goals / Updated goals:  Progress towards goals / Updated goals: To be accomplished in 4 weeks:               1. Patient will improve FOTO to at least 65 to demonstrate improved function. status at PN: FOTO = 59   Assess at end of PN               2. Patient will improve Right shoulder AROM flexion and scaption to 120deg to increase ease with overhead activities. Status at PN: right shoulder PROM flexion 151 deg, scaption 150 deg                  3. Patient will report ability to lift 2# into overhead cabinet x 5 repetitions to improve ease of ADL completion.      PLAN  []  Upgrade activities as tolerated     [x]  Continue plan of care  []  Update interventions per flow sheet       []  Discharge due to:_  []  Other:_      Faustina Salamanca PT 11/8/2022  11:14 AM    Future Appointments   Date Time Provider Desirae Lucia   11/8/2022 11:15 AM Carie Enciso, PT [facility]   11/11/2022  1:00 PM REYNALDO Vaughn [facility]   11/15/2022 11:15 AM Carie Enciso, PT [facility]   11/22/2022 11:15 AM Carie Enciso, PT [facility]   11/29/2022  8:20 AM Latisha Severino, [facility] BS AMB

## 2022-11-11 ENCOUNTER — OFFICE VISIT (OUTPATIENT)
Dept: ORTHOPEDIC SURGERY | Age: 57
End: 2022-11-11
Payer: OTHER GOVERNMENT

## 2022-11-11 VITALS
OXYGEN SATURATION: 98 % | WEIGHT: 171.6 LBS | BODY MASS INDEX: 28.59 KG/M2 | TEMPERATURE: 97.8 F | HEIGHT: 65 IN | HEART RATE: 108 BPM

## 2022-11-11 DIAGNOSIS — M75.101 TEAR OF RIGHT ROTATOR CUFF, UNSPECIFIED TEAR EXTENT, UNSPECIFIED WHETHER TRAUMATIC: Primary | ICD-10-CM

## 2022-11-11 PROCEDURE — 99024 POSTOP FOLLOW-UP VISIT: CPT | Performed by: PHYSICIAN ASSISTANT

## 2022-11-11 NOTE — PROGRESS NOTES
Keith Ford  1965     HISTORY OF PRESENT ILLNESS  Keith Ford is a 59 y.o. female who presents today for evaluation s/p Right shoulder arthroscopic 3cm rotator cuff repair on 8/29. Patient has been going to PT. Her numbness in her tongue resolved without going to ENT. Gets occasional discomfort in her biceps area that resolves with ice and massage. Describes pain as a 0/10. Patient denies any fever, chills, chest pain, shortness of breath or calf pain. The remainder of the review of systems is negative. There are no new illness or injuries to report since last seen in the office. No changes in medications, allergies, social or family history. PHYSICAL EXAM:   Visit Vitals  LMP 05/16/2014      The patient is a well-developed, well-nourished female in no acute distress. The patient is alert and oriented times three. The patient appears to be well groomed.  Mood and affect are normal.  ORTHOPEDIC EXAM of right shoulder:  Inspection: swelling not present,  Bruising not present  Incisions well healed  Passive glenohumeral abduction 0-90 degrees  Stability: Stable  Strength:5/5  2+ distal pulses    IMPRESSION:  S/P Right shoulder arthroscopic 3cm rotator cuff repair    PLAN:   Patient improving post operatively  Will cont with PT transition to HEP when ready  RTC PRN    NEYMAR [facility] Specialist

## 2022-11-11 NOTE — LETTER
NOTIFICATION RETURN TO WORK / SCHOOL    11/11/2022 1:09 PM    Ms. Davis Velazquez  [address] [address]      To Whom It May Concern:    Davis Velazquez is currently under the care of [address] [place] [address] Winston Marinelli. She will return to work/school on: 11/28/22 with following restrictions:  1. No lifting greater than 15lbs with right arm x 6 weeks  2. Only work 3 days a week with shifts no longer than 4 hours at a time for 3 weeks  After 3 weeks can work up to 6 hour shifts    After 6 weeks can return to full duty no restrictions    If there are questions or concerns please have the patient contact our office.         Sincerely,      REYNALDO Perry

## 2022-11-15 ENCOUNTER — HOSPITAL ENCOUNTER (OUTPATIENT)
Dept: PHYSICAL THERAPY | Age: 57
Discharge: HOME | End: 2022-11-15
Attending: ORTHOPAEDIC SURGERY
Payer: OTHER GOVERNMENT

## 2022-11-15 PROCEDURE — 97140 MANUAL THERAPY 1/> REGIONS: CPT

## 2022-11-15 PROCEDURE — 97110 THERAPEUTIC EXERCISES: CPT

## 2022-11-15 PROCEDURE — 97530 THERAPEUTIC ACTIVITIES: CPT

## 2022-11-15 NOTE — PROGRESS NOTES
PT DAILY TREATMENT NOTE     Patient Name: Viri Meehan  Date:11/15/2022  : 1965  [x]  Patient  Verified  Payor: DELMY / Plan: Tone Madrigal 74 / Product Type:  /    In time:1117  Out time:1155  Total Treatment Time (min): 38  Visit #: 4 of 8      Treatment Area: Right shoulder pain [M25.511]    SUBJECTIVE  Pain Level (0-10 scale): 0/10  Any medication changes, allergies to medications, adverse drug reactions, diagnosis change, or new procedure performed?: [x] No    [] Yes (see summary sheet for update)  Subjective functional status/changes:   [] No changes reported  Pt reports MD visit went well and can return to work . Per pt reports MD pleased with progress thus far. Pt states she plans to d/c from PT after 4 remaining sessions and will continue to progress at home. OBJECTIVE    20 min Therapeutic Exercise:  [x] See flow sheet :   Rationale: increase ROM and increase strength to improve the patients ability to complete ADLs with ease. 10 min Therapeutic Activity:  [x]  See flow sheet :functional gripping and reaching   Rationale: increase ROM, increase strength, improve coordination, and increase proprioception  to improve the patients ability to complete functional activities with ease. 8 min Manual Therapy:  PROM into right flexion, right abduction, and right ER; IR/ER isometrics and GH perturbations with patient supine throughout. The manual therapy interventions were performed at a separate and distinct time from the therapeutic activities interventions. Rationale: decrease pain, increase ROM, increase tissue extensibility, and decrease trigger points to improve functional mobility.            With   [] TE   [] TA   [] neuro   [] other: Patient Education: [x] Review HEP    [] Progressed/Changed HEP based on:   [] positioning   [] body mechanics   [] transfers   [] heat/ice application    [] other:      Other Objective/Functional Measures: Continued with therex and NMR per flow sheet    Added S/L ER for posterior cuff strengthening     Pain Level (0-10 scale) post treatment: 0/10    ASSESSMENT/Changes in Function: Session continued to focus on R shoulder ROM, mobility and functional strength per referral. No increase in pain throughout session today. Pt demos excellent R shoulder ROM to date. Continue to progress POC, progressing strengthening according to referral.     Patient will continue to benefit from skilled PT services to modify and progress therapeutic interventions, address functional mobility deficits, address ROM deficits, address strength deficits, analyze and address soft tissue restrictions, analyze and cue movement patterns, analyze and modify body mechanics/ergonomics, assess and modify postural abnormalities, and instruct in home and community integration to attain remaining goals. []  See Plan of Care  []  See progress note/recertification  []  See Discharge Summary         Progress towards goals / Updated goals: To be accomplished in 4 weeks:               1. Patient will improve FOTO to at least 65 to demonstrate improved function. status at PN: FOTO = 59   Assess at end of PN               2. Patient will improve Right shoulder AROM flexion and scaption to 120deg to increase ease with overhead activities. Status at PN: right shoulder PROM flexion 151 deg, scaption 150 deg   Current: MET R shoulder AROM Flexion: 169 deg, Scaption: 160 deg 11/15/2022                  3. Patient will report ability to lift 2# into overhead cabinet x 5 repetitions to improve ease of ADL completion.      PLAN  []  Upgrade activities as tolerated     [x]  Continue plan of care  []  Update interventions per flow sheet       []  Discharge due to:_  []  Other:_      Ml Rodriguez, PT 11/15/2022  11:19 AM    Future Appointments   Date Time Provider Desirae Lucia   11/22/2022 11:15 AM Martha Moulton, PT [id number] 11/29/2022  8:20 AM Nicole Persaud, NP [facility]

## 2022-11-22 ENCOUNTER — APPOINTMENT (OUTPATIENT)
Dept: FAMILY MEDICINE CLINIC | Age: 57
End: 2022-11-22

## 2022-11-22 ENCOUNTER — HOSPITAL ENCOUNTER (OUTPATIENT)
Dept: PHYSICAL THERAPY | Age: 57
Discharge: HOME | End: 2022-11-22
Attending: ORTHOPAEDIC SURGERY
Payer: OTHER GOVERNMENT

## 2022-11-22 ENCOUNTER — HOSPITAL ENCOUNTER (OUTPATIENT)
Dept: LAB | Age: 57
Discharge: HOME | End: 2022-11-22
Payer: OTHER GOVERNMENT

## 2022-11-22 DIAGNOSIS — D64.9 ANEMIA, UNSPECIFIED TYPE: Primary | ICD-10-CM

## 2022-11-22 DIAGNOSIS — D64.9 ANEMIA, UNSPECIFIED TYPE: ICD-10-CM

## 2022-11-22 LAB
BASOPHILS # BLD: 0.1 K/UL (ref 0–0.1)
BASOPHILS NFR BLD: 1 % (ref 0–2)
DIFFERENTIAL METHOD BLD: ABNORMAL
EOSINOPHIL # BLD: 0.2 K/UL (ref 0–0.4)
EOSINOPHIL NFR BLD: 3 % (ref 0–5)
ERYTHROCYTE [DISTWIDTH] IN BLOOD BY AUTOMATED COUNT: 13.4 % (ref 11.6–14.5)
FERRITIN SERPL-MCNC: 21 NG/ML (ref 8–388)
HCT VFR BLD AUTO: 34.9 % (ref 35–45)
HGB BLD-MCNC: 10.4 G/DL (ref 12–16)
IMM GRANULOCYTES # BLD AUTO: 0 K/UL (ref 0–0.04)
IMM GRANULOCYTES NFR BLD AUTO: 0 % (ref 0–0.5)
IRON SATN MFR SERPL: 14 % (ref 20–50)
IRON SERPL-MCNC: 36 UG/DL (ref 50–175)
LYMPHOCYTES # BLD: 2.5 K/UL (ref 0.9–3.6)
LYMPHOCYTES NFR BLD: 39 % (ref 21–52)
MCH RBC QN AUTO: 25.3 PG (ref 24–34)
MCHC RBC AUTO-ENTMCNC: 29.8 G/DL (ref 31–37)
MCV RBC AUTO: 84.9 FL (ref 78–100)
MONOCYTES # BLD: 0.6 K/UL (ref 0.05–1.2)
MONOCYTES NFR BLD: 9 % (ref 3–10)
NEUTS SEG # BLD: 3.1 K/UL (ref 1.8–8)
NEUTS SEG NFR BLD: 48 % (ref 40–73)
NRBC # BLD: 0 K/UL (ref 0–0.01)
NRBC BLD-RTO: 0 PER 100 WBC
PLATELET # BLD AUTO: 288 K/UL (ref 135–420)
PMV BLD AUTO: 9.9 FL (ref 9.2–11.8)
RBC # BLD AUTO: 4.11 M/UL (ref 4.2–5.3)
TIBC SERPL-MCNC: 263 UG/DL (ref 250–450)
WBC # BLD AUTO: 6.5 K/UL (ref 4.6–13.2)

## 2022-11-22 PROCEDURE — 85025 COMPLETE CBC W/AUTO DIFF WBC: CPT

## 2022-11-22 PROCEDURE — 97530 THERAPEUTIC ACTIVITIES: CPT

## 2022-11-22 PROCEDURE — 97140 MANUAL THERAPY 1/> REGIONS: CPT

## 2022-11-22 PROCEDURE — 36415 COLL VENOUS BLD VENIPUNCTURE: CPT

## 2022-11-22 PROCEDURE — 82728 ASSAY OF FERRITIN: CPT

## 2022-11-22 PROCEDURE — 83540 ASSAY OF IRON: CPT

## 2022-11-22 PROCEDURE — 97110 THERAPEUTIC EXERCISES: CPT

## 2022-11-22 NOTE — PROGRESS NOTES
PT DISCHARGE DAILY NOTE AND [id number]    Patient name: Dar Carolina Start of Care: 2022   Referral source: Sunitha Del Cid,* : 1965   Medical/Treatment Diagnosis: Right shoulder pain [M25.511] Onset Date:2022     Prior Hospitalization: see medical history Provider#: [id number]   Medications: Verified on Patient Summary List    Comorbidities:  Patient confirmed: Allergies, Arthritis, Asthma, BMI over 30, HA, Prior surgery, Sleep dysfunction, tobacco use    Prior Level of Function: Patient is right hand dominant. Patient enjoys sewing and crafts and was able to do that prior to the injury. Visits from Start of Care: 11    Missed Visits: 2    Reporting Period : 10/26/2022 to 2022    Date:2022  : 1965  [x]  Patient  Verified  Payor:  / Plan: Tone Madrigal 74 / Product Type:  /    In time:1028AM  Out time:1104am  Total Treatment Time (min): 36  Visit #: 5 of 8    SUBJECTIVE  Pain Level (0-10 scale): 1  Any medication changes, allergies to medications, adverse drug reactions, diagnosis change, or new procedure performed?: [x] No    [] Yes (see summary sheet for update)  Subjective functional status/changes:   [] No changes reported  Reports she is a little sore from doing house chores. OBJECTIVE    20 min Therapeutic Exercise:  [x] See flow sheet :   Rationale: increase ROM and increase strength to improve the patients ability to complete ADLs with improved ease. 8 min Therapeutic Activity:  [x]  See flow sheet :functional gripping and reaching   Rationale: increase ROM, increase strength, improve coordination, and increase proprioception  to improve the patients ability to reach overhead with reduced pain and improved ease.       8 min Manual Therapy:  PROM in all directions with OP; STM right UT; GR III posterior mobs   The manual therapy interventions were performed at a separate and distinct time from the therapeutic activities interventions. Rationale: decrease pain, increase ROM, and increase tissue extensibility to improve ease of overhead reaching and return to work. With   [] TE   [] TA   [] neuro   [] other: Patient Education: [x] Review HEP    [] Progressed/Changed HEP based on:   [] positioning   [] body mechanics   [] transfers   [] heat/ice application    [] other:      Other Objective/Functional Measures: see summary of care. Pain Level (0-10 scale) post treatment: 0    Summary of Care: To be accomplished in 4 weeks:                1. Patient will improve FOTO to at least 65 to demonstrate improved function. status at PN: FOTO = 59    Current: not met, progressed to 60 11/22/2022               2. Patient will improve Right shoulder AROM flexion and scaption to 120deg to increase ease with overhead activities. Status at PN: right shoulder PROM flexion 151 deg, scaption 150 deg              Current: MET R shoulder AROM Flexion: 169 deg, Scaption: 160 deg 11/15/2022                  3. Patient will report ability to lift 2# into overhead cabinet x 5 repetitions to improve ease of ADL completion. PN: not tested    Current: met, performs without pain. 11/22/2022    ASSESSMENT/Changes in Function: Pt opts to discharge today and plans to return to work next week. Feels comfortable with her HEP. Advised her that she may purchase pulleys at home to continue to work on her PROM at home for pain relief. Pt has no further questions.      Thank you for this referral!      PLAN  [x]Discontinue therapy: [x]Patient has reached or is progressing toward set goals      [x]Patient is non-compliant or has abdicated      []Due to lack of appreciable progress towards set goals    Katelyn Roe, PT 11/22/2022  10:10 AM

## 2022-11-22 NOTE — PROGRESS NOTES
Physical Therapy Discharge Instructions      [facility]  [address]  [place], [address]  [phone] fax    Patient: Byron Caldera  : 1965      Continue Home Exercise Program 2 times per day for 4 weeks, then decrease to 3 times per week      Continue with    [x] Ice  as needed 2 times per day     [] Heat           Follow up with MD:     [] Upon completion of therapy     [x] As needed      Recommendations:     [x]   Return to activity with home program    []   Return to activity with the following modifications:       []Post Rehab Program    []Join Independent aquatic program     []Return to/join local gym    Additional Comments: Consider purchasing arm pulleys for home on [facility] that may be affixed over a door for you to continue your stretching at home.     Lenora Damian, PT 2022 10:32 AM

## 2022-11-29 ENCOUNTER — VIRTUAL VISIT (OUTPATIENT)
Dept: FAMILY MEDICINE CLINIC | Age: 57
End: 2022-11-29
Payer: OTHER GOVERNMENT

## 2022-11-29 DIAGNOSIS — K21.9 GASTROESOPHAGEAL REFLUX DISEASE, UNSPECIFIED WHETHER ESOPHAGITIS PRESENT: Primary | ICD-10-CM

## 2022-11-29 DIAGNOSIS — Z80.9 FAMILY HISTORY OF CANCER: ICD-10-CM

## 2022-11-29 DIAGNOSIS — D50.9 IRON DEFICIENCY ANEMIA, UNSPECIFIED IRON DEFICIENCY ANEMIA TYPE: ICD-10-CM

## 2022-11-29 DIAGNOSIS — N62 MACROMASTIA: ICD-10-CM

## 2022-11-29 PROCEDURE — 99213 OFFICE O/P EST LOW 20 MIN: CPT | Performed by: NURSE PRACTITIONER

## 2022-11-29 RX ORDER — CYANOCOBALAMIN (VITAMIN B-12) 1000 MCG
1 TABLET, EXTENDED RELEASE ORAL
Qty: 90 TABLET | Refills: 1 | Status: SHIPPED
Start: 2022-11-29 | End: 2022-12-15 | Stop reason: RX

## 2022-11-29 NOTE — PROGRESS NOTES
Davis Velazquez is a 59 y.o. female who was seen by synchronous (real-time) audio-video technology on 11/29/2022 for GERD and Weight Loss    Assessment & Plan:   Diagnoses and all orders for this visit:    1. Gastroesophageal reflux disease, unspecified whether esophagitis present    2. Family history of cancer  -     REFERRAL TO HEMATOLOGY    3. Iron deficiency anemia, unspecified iron deficiency anemia type  -     REFERRAL TO HEMATOLOGY    Other orders  -     iron, carbonyl (FEOSOL) 45 mg tab; Take 1 Tablet by mouth daily (with breakfast). Begin ferrous sulfate as above Rx sent to [address] [place] [address]    Follow-up and Dispositions    Return in about 4 months (around 3/29/2023) for anemia with labs 1 week prior. 712  Subjective:   Patient reports GERD has improved with prilosec. Patient expressing concerns regarding mild anemia and also family history of cancer due to no change in bruising. Prior to Admission medications    Medication Sig Start Date End Date Taking? Authorizing Provider   acetaminophen (Tylenol Extra Strength) 500 mg tablet Take 1 Tablet by mouth every six (6) hours as needed for Pain (not to exceed 4 tabs in 24 hrs). 10/28/22  Yes Isaac Trejo NP   butalbital-acetaminophen-caffeine (FIORICET, ESGIC) -40 mg per tablet Take 1 Tablet by mouth every six (6) hours as needed (for pain or headache). Not to exceed 4 tabs in 24 hrs 10/28/22  Yes Isaac Trejo NP   ondansetron (ZOFRAN ODT) 8 mg disintegrating tablet Take 1 Tablet by mouth every twelve (12) hours as needed for Nausea. 10/28/22  Yes Isaac Trejo NP   rosuvastatin (CRESTOR) 5 mg tablet Take 1 Tablet by mouth nightly. 10/28/22  Yes Tamra MARTINEZ NP   montelukast (SINGULAIR) 10 mg tablet Take 1 Tablet by mouth nightly. 10/28/22  Yes Isaac Trejo NP   diphenhydrAMINE (Benadryl Allergy) 25 mg tablet Take 1 Tablet by mouth every six (6) hours as needed (allergies).  10/28/22  Yes Bruce Beltran Ashley Love NP   fluticasone propion-salmeteroL (ADVAIR/WIXELA) 250-50 mcg/dose diskus inhaler Take 1 Puff by inhalation every twelve (12) hours. 10/28/22  Yes Susie Werner NP   albuterol (PROVENTIL HFA, VENTOLIN HFA, PROAIR HFA) 90 mcg/actuation inhaler Take 2 Puffs by inhalation every four (4) hours as needed for Wheezing or Shortness of Breath. 10/28/22  Yes Susie Werner NP   albuterol (PROVENTIL VENTOLIN) 2.5 mg /3 mL (0.083 %) nebu 3 mL by Nebulization route every four (4) hours as needed for Wheezing or Shortness of Breath. 10/28/22  Yes Susie Werner NP   ibuprofen (MOTRIN) 800 mg tablet Take 1 Tablet by mouth every eight (8) hours as needed for Pain (take with food). 10/28/22  Yes Susie Werner NP   omeprazole (PRILOSEC) 20 mg capsule Take 1 Capsule by mouth daily. 10/28/22  Yes Susie Werner NP   diclofenac EC (VOLTAREN) 75 mg EC tablet Take 1 Tablet by mouth two (2) times daily (with meals). 8/19/22  Yes Nate Weston PA   meloxicam (MOBIC) 7.5 mg tablet Take 1 Tablet by mouth in the morning. 8/8/22  Yes Nate Weston PA   polyethylene glycol (MIRALAX) 17 gram/dose powder Take 17 g by mouth daily. 7/6/22  Yes Susie Werner NP   naproxen (NAPROSYN) 500 mg tablet Take 1 Tablet by mouth two (2) times daily as needed for Pain. 7/6/22  Yes Susie Werner NP   amitriptyline (ELAVIL) 25 mg tablet Take 1 tab po QHS, may increase to 2 tabs po QHS 3/23/21  Yes Provider, Historical   naratriptan (AMERGE) 2.5 mg tab  3/23/21  Yes Provider, Historical   erenumab-aooe (Aimovig Autoinjector) 140 mg/mL injection 140 mg by SubCUTAneous route. 3/23/21  Yes Provider, Historical   loratadine (CLARITIN REDITABS) 10 mg dissolvable tablet Take 1 Tab by mouth daily. 4/26/19  Yes Susie Nose, NP   Nebulizer & Compressor machine 1 Each by Does Not Apply route every four (4) hours as needed.  5/1/13  Yes Susie Nose, NP   hydrocortisone-pramoxine ([facility] rectal foam Insert 1 Applicator into rectum two (2) times a day. Patient not taking: No sig reported 5/6/22   Jeffery MARTINEZ NP   oxyCODONE-acetaminophen (PERCOCET) 5-325 mg per tablet TAKE 1 TABLET BY MOUTH EVERY 8 HOURS AS NEEDED FOR 7 DAYS  Patient not taking: No sig reported 4/12/21   ProviderKeenan   cloNIDine (CATAPRES) 0.1 mg/24 hr ptwk 1 Patch by TransDERmal route every seven (7) days. Patient not taking: No sig reported 4/22/21   Filiberto Argueta NP   Transparent Dressings (Tegaderm Frame Style) 2 3/8 X 2 3/4 \" bndg To use with clonidine patch weekly  Patient not taking: No sig reported 10/21/20   Filiberto Argueta NP   ospemifene (OSPHENA) 60 mg tab tablet Take 1 Tab by mouth daily. Patient not taking: No sig reported 6/25/20   Andie Maria MD     Patient Active Problem List   Diagnosis Code    HTN (hypertension) I10    Hyperlipemia E78.5    Asthma J45.909    Allergic rhinitis J30.9    Obesity E66.9    Chronic shoulder pain I20.859, S94.93    Lichen planus E51.1    Anxiety F41.9    Allergic conjunctivitis H10.10    Severe obesity ([address] E66.01     Patient Active Problem List    Diagnosis Date Noted    Severe obesity ([facility] [address] 03/10/2020    Allergic conjunctivitis 13/87/7724    Lichen planus 01/69/9980    Anxiety 09/07/2010    HTN (hypertension) 07/16/2010    Hyperlipemia 07/16/2010    Asthma 07/16/2010    Allergic rhinitis 07/16/2010    Obesity 07/16/2010    Chronic shoulder pain 07/16/2010     Current Outpatient Medications   Medication Sig Dispense Refill    acetaminophen (Tylenol Extra Strength) 500 mg tablet Take 1 Tablet by mouth every six (6) hours as needed for Pain (not to exceed 4 tabs in 24 hrs). 60 Tablet 2    butalbital-acetaminophen-caffeine (FIORICET, ESGIC) -40 mg per tablet Take 1 Tablet by mouth every six (6) hours as needed (for pain or headache). Not to exceed 4 tabs in 24 hrs 40 Tablet 1    ondansetron (ZOFRAN ODT) 8 mg disintegrating tablet Take 1 Tablet by mouth every twelve (12) hours as needed for Nausea.  21 Tablet 0    rosuvastatin (CRESTOR) 5 mg tablet Take 1 Tablet by mouth nightly. 90 Tablet 3    montelukast (SINGULAIR) 10 mg tablet Take 1 Tablet by mouth nightly. 90 Tablet 3    diphenhydrAMINE (Benadryl Allergy) 25 mg tablet Take 1 Tablet by mouth every six (6) hours as needed (allergies). 30 Tablet 3    fluticasone propion-salmeteroL (ADVAIR/WIXELA) 250-50 mcg/dose diskus inhaler Take 1 Puff by inhalation every twelve (12) hours. 3 Each 3    albuterol (PROVENTIL HFA, VENTOLIN HFA, PROAIR HFA) 90 mcg/actuation inhaler Take 2 Puffs by inhalation every four (4) hours as needed for Wheezing or Shortness of Breath. 1 Each 6    albuterol (PROVENTIL VENTOLIN) 2.5 mg /3 mL (0.083 %) nebu 3 mL by Nebulization route every four (4) hours as needed for Wheezing or Shortness of Breath. 2 Each 3    ibuprofen (MOTRIN) 800 mg tablet Take 1 Tablet by mouth every eight (8) hours as needed for Pain (take with food). 60 Tablet 2    omeprazole (PRILOSEC) 20 mg capsule Take 1 Capsule by mouth daily. 90 Capsule 1    diclofenac EC (VOLTAREN) 75 mg EC tablet Take 1 Tablet by mouth two (2) times daily (with meals). 60 Tablet 0    meloxicam (MOBIC) 7.5 mg tablet Take 1 Tablet by mouth in the morning. 30 Tablet 1    polyethylene glycol (MIRALAX) 17 gram/dose powder Take 17 g by mouth daily. 1521 g 1    naproxen (NAPROSYN) 500 mg tablet Take 1 Tablet by mouth two (2) times daily as needed for Pain. 60 Tablet 1    amitriptyline (ELAVIL) 25 mg tablet Take 1 tab po QHS, may increase to 2 tabs po QHS      naratriptan (AMERGE) 2.5 mg tab       erenumab-aooe (Aimovig Autoinjector) 140 mg/mL injection 140 mg by SubCUTAneous route.      loratadine (CLARITIN REDITABS) 10 mg dissolvable tablet Take 1 Tab by mouth daily. 30 Tab 2    Nebulizer & Compressor machine 1 Each by Does Not Apply route every four (4) hours as needed. 1 Each 0    hydrocortisone-pramoxine (PROCTOFOAM HC) rectal foam Insert 1 Applicator into rectum two (2) times a day.  (Patient not taking: No sig reported) 1 Each 0    oxyCODONE-acetaminophen (PERCOCET) 5-325 mg per tablet TAKE 1 TABLET BY MOUTH EVERY 8 HOURS AS NEEDED FOR 7 DAYS (Patient not taking: No sig reported)      cloNIDine (CATAPRES) 0.1 mg/24 hr ptwk 1 Patch by TransDERmal route every seven (7) days. (Patient not taking: No sig reported) 12 Patch 2    Transparent Dressings (Tegaderm Frame Style) 2 3/8 X 2 3/4 \" bndg To use with clonidine patch weekly (Patient not taking: No sig reported) 20 Each 1    ospemifene (OSPHENA) 60 mg tab tablet Take 1 Tab by mouth daily. (Patient not taking: No sig reported) 90 Tab 3     Allergies   Allergen Reactions    Winton Anaphylaxis    Cherry Anaphylaxis    Methocarbamol Nausea and Vomiting    Asa-Acetaminophen-Caff-Potass Hives     Just allergic to aspirin only per Pt. Aspirin Unknown (comments)     Reaction(s): Unknown; Note: [id number] HIVES [id number]      Osborne Other (comments)     syncope    Osborne Concentrate [Flavoring Agent] Nausea and Vomiting    Zyrtec [Cetirizine] Other (comments)     Dizziness, blurred vision and disoriented     Past Medical History:   Diagnosis Date    Allergic rhinitis 7/16/2010    Asthma     Chronic shoulder pain 7/16/2010    HTN (hypertension) 7/16/2010    Hyperlipemia 6/52/1956    Lichen planus 4/6/4681    Obesity 7/16/2010     Past Surgical History:   Procedure Laterality Date    HX HYSTERECTOMY  5/16/14    HX HYSTERECTOMY       Family History   Problem Relation Age of Onset    Hypertension Mother     Diabetes Mother     Hypertension Sister     Diabetes Maternal Grandmother     Cancer Maternal Aunt         breast at age 54    Cancer Other         lung ca in great grand ma.     No Known Problems Father      Social History     Tobacco Use    Smoking status: Never    Smokeless tobacco: Never   Substance Use Topics    Alcohol use: No       ROS    Objective:     Patient-Reported Vitals 11/28/2022   Patient-Reported Weight 170   Patient-Reported Pulse 52 Patient-Reported Temperature 79   Patient-Reported Systolic  692   Patient-Reported Diastolic 78      General: alert, cooperative, no distress   Mental  status: normal mood, behavior, speech, dress, motor activity, and thought processes, able to follow commands   HENT: NCAT   Neck: no visualized mass   Resp: no respiratory distress   Neuro: no gross deficits   Skin: no discoloration or lesions of concern on visible areas   Psychiatric: normal affect, consistent with stated mood, no evidence of hallucinations     Additional exam findings: We discussed the expected course, resolution and complications of the diagnosis(es) in detail. Medication risks, benefits, costs, interactions, and alternatives were discussed as indicated. I advised her to contact the office if her condition worsens, changes or fails to improve as anticipated. She expressed understanding with the diagnosis(es) and plan. Manan Yoo, was evaluated through a synchronous (real-time) audio-video encounter. The patient (or guardian if applicable) is aware that this is a billable service, which includes applicable co-pays. This Virtual Visit was conducted with patient's (and/or legal guardian's) consent. The visit was conducted pursuant to the emergency declaration under the [facility], [address] [place] [address] waiver authority and the [employer] General Act. Patient identification was verified, and a caregiver was present when appropriate. The patient was located at: Home: [address] [place] [id number]  The provider was located at:  Facility (Appt Department): [address] [place] [address]  [address] [facility] [address] CRISTEL Yañez

## 2022-11-29 NOTE — PROGRESS NOTES
Johny Bush (: 1965) is a 59 y.o. female, established patient, here for evaluation of the following chief complaint(s):   GERD and Weight Loss           Johny Bush, was evaluated through a synchronous (real-time) audio-video encounter. The patient (or guardian if applicable) is aware that this is a billable service, which includes applicable co-pays. This Virtual Visit was conducted with patient's (and/or legal guardian's) consent. The visit was conducted pursuant to the emergency declaration under the [address] [place] [address], [address] [place] [address] authority and the [employer] and [employer] General Act. Patient identification was verified, and a caregiver was present when appropriate. The patient was located at: Home: [address] [facility] [id number]  The provider was located at: Facility (Appt Department): [address] [place] [address] [address]  [address]        [address] [facility] was used to authenticate this note.   -- Priti Luo

## 2022-12-15 RX ORDER — IRON,CARBONYL/ASCORBIC ACID 65MG-125MG
1 TABLET, DELAYED RELEASE (ENTERIC COATED) ORAL DAILY
Qty: 90 TABLET | Refills: 3 | Status: SHIPPED | OUTPATIENT
Start: 2022-12-15

## 2023-01-05 ENCOUNTER — TELEPHONE (OUTPATIENT)
Dept: FAMILY MEDICINE CLINIC | Age: 58
End: 2023-01-05

## 2023-01-12 ENCOUNTER — VIRTUAL VISIT (OUTPATIENT)
Dept: FAMILY MEDICINE CLINIC | Age: 58
End: 2023-01-12
Payer: OTHER GOVERNMENT

## 2023-01-12 DIAGNOSIS — R01.1 HEART MURMUR: Primary | ICD-10-CM

## 2023-01-12 DIAGNOSIS — J45.30 MILD PERSISTENT ASTHMA WITHOUT COMPLICATION: ICD-10-CM

## 2023-01-12 PROCEDURE — 99212 OFFICE O/P EST SF 10 MIN: CPT | Performed by: NURSE PRACTITIONER

## 2023-01-12 NOTE — PROGRESS NOTES
Dick Vincent is a 59 y.o. female who was seen by synchronous (real-time) audio-video technology on 1/12/2023 for Referral / Consult (Cardiology for [facility])    Assessment & Plan:   Diagnoses and all orders for this visit:    1. Heart murmur  -     REFERRAL TO CARDIOLOGY    2. Mild persistent asthma without complication  -     REFERRAL TO PULMONARY DISEASE      Follow-up and Dispositions    Return if symptoms worsen or fail to improve, for keep previous scheduled appt. Subjective:   Patient states she needs referral to cardiologist and pulmonologist prior to having endocrinology/colonoscopy due to heart murmur and asthma. Patient reports she needs an updated referral to neurology through [address] [place] [address]. Prior to Admission medications    Medication Sig Start Date End Date Taking? Authorizing Provider   acetaminophen (Tylenol Extra Strength) 500 mg tablet Take 1 Tablet by mouth every six (6) hours as needed for Pain (not to exceed 4 tabs in 24 hrs). 10/28/22  Yes Kiki Oppenheim, NP   butalbital-acetaminophen-caffeine (FIORICET, ESGIC) -40 mg per tablet Take 1 Tablet by mouth every six (6) hours as needed (for pain or headache). Not to exceed 4 tabs in 24 hrs 10/28/22  Yes Kiki Oppenheim, NP   ondansetron (ZOFRAN ODT) 8 mg disintegrating tablet Take 1 Tablet by mouth every twelve (12) hours as needed for Nausea. 10/28/22  Yes Kiki Oppenheim, NP   rosuvastatin (CRESTOR) 5 mg tablet Take 1 Tablet by mouth nightly. 10/28/22  Yes Jasmin MARTINEZ NP   montelukast (SINGULAIR) 10 mg tablet Take 1 Tablet by mouth nightly. 10/28/22  Yes Kiki Oppenheim, NP   diphenhydrAMINE (Benadryl Allergy) 25 mg tablet Take 1 Tablet by mouth every six (6) hours as needed (allergies). 10/28/22  Yes Kiki Oppenheim, NP   fluticasone propion-salmeteroL (ADVAIR/WIXELA) 250-50 mcg/dose diskus inhaler Take 1 Puff by inhalation every twelve (12) hours.  10/28/22  Yes Kiki Oppenheim, NP   albuterol (PROVENTIL HFA, VENTOLIN HFA, PROAIR HFA) 90 mcg/actuation inhaler Take 2 Puffs by inhalation every four (4) hours as needed for Wheezing or Shortness of Breath. 10/28/22  Yes Raj Nichols NP   albuterol (PROVENTIL VENTOLIN) 2.5 mg /3 mL (0.083 %) nebu 3 mL by Nebulization route every four (4) hours as needed for Wheezing or Shortness of Breath. 10/28/22  Yes Raj Nichols NP   ibuprofen (MOTRIN) 800 mg tablet Take 1 Tablet by mouth every eight (8) hours as needed for Pain (take with food). 10/28/22  Yes Raj Nichols NP   omeprazole (PRILOSEC) 20 mg capsule Take 1 Capsule by mouth daily. 10/28/22  Yes Raj Nichols NP   polyethylene glycol (MIRALAX) 17 gram/dose powder Take 17 g by mouth daily. 7/6/22  Yes Raj Nichols NP   naproxen (NAPROSYN) 500 mg tablet Take 1 Tablet by mouth two (2) times daily as needed for Pain. 7/6/22  Yes Raj Nichols NP   amitriptyline (ELAVIL) 25 mg tablet Take 1 tab po QHS, may increase to 2 tabs po QHS 3/23/21  Yes Provider, Historical   naratriptan (AMERGE) 2.5 mg tab  3/23/21  Yes Provider, Historical   erenumab-aooe (Aimovig Autoinjector) 140 mg/mL injection 140 mg by SubCUTAneous route. 3/23/21  Yes Provider, Historical   loratadine (CLARITIN REDITABS) 10 mg dissolvable tablet Take 1 Tab by mouth daily. 4/26/19  Yes Raj Nichols NP   Nebulizer & Compressor machine 1 Each by Does Not Apply route every four (4) hours as needed. 5/1/13  Yes Raj Nichols NP   iron,carbonyl-vitamin C (Vitron-C) 65 mg iron- 125 mg TbEC Take 1 Tablet by mouth daily. Patient not taking: Reported on 1/12/2023 12/15/22   Matias MARTINEZ NP   diclofenac EC (VOLTAREN) 75 mg EC tablet Take 1 Tablet by mouth two (2) times daily (with meals). Patient not taking: Reported on 1/12/2023 8/19/22   REYNALDO Denise   meloxicam (MOBIC) 7.5 mg tablet Take 1 Tablet by mouth in the morning.   Patient not taking: Reported on 1/12/2023 8/8/22   REYNALDO Denise   hydrocortisone-pramoxine ([facility] rectal foam Insert 1 Applicator into rectum two (2) times a day. Patient not taking: No sig reported 5/6/22   Alfredo MARTINEZ NP   oxyCODONE-acetaminophen (PERCOCET) 5-325 mg per tablet TAKE 1 TABLET BY MOUTH EVERY 8 HOURS AS NEEDED FOR 7 DAYS  Patient not taking: No sig reported 4/12/21   Provider, Keenan   cloNIDine (CATAPRES) 0.1 mg/24 hr ptwk 1 Patch by TransDERmal route every seven (7) days. Patient not taking: Reported on 1/12/2023 4/22/21   Aramis Gill NP   Transparent Dressings (Tegaderm Frame Style) 2 3/8 X 2 3/4 \" bndg To use with clonidine patch weekly  Patient not taking: No sig reported 10/21/20   Aramis Gill NP   ospemifene (OSPHENA) 60 mg tab tablet Take 1 Tab by mouth daily. Patient not taking: No sig reported 6/25/20   Kelsi Mills MD     Patient Active Problem List   Diagnosis Code    HTN (hypertension) I10    Hyperlipemia E78.5    Asthma J45.909    Allergic rhinitis J30.9    Obesity E66.9    Chronic shoulder pain A14.179, F07.62    Lichen planus Z61.5    Anxiety F41.9    Allergic conjunctivitis H10.10    Severe obesity ([address] E66.01     Patient Active Problem List    Diagnosis Date Noted    Severe obesity ([address] 03/10/2020    Allergic conjunctivitis 10/33/9994    Lichen planus 17/79/0266    Anxiety 09/07/2010    HTN (hypertension) 07/16/2010    Hyperlipemia 07/16/2010    Asthma 07/16/2010    Allergic rhinitis 07/16/2010    Obesity 07/16/2010    Chronic shoulder pain 07/16/2010     Current Outpatient Medications   Medication Sig Dispense Refill    acetaminophen (Tylenol Extra Strength) 500 mg tablet Take 1 Tablet by mouth every six (6) hours as needed for Pain (not to exceed 4 tabs in 24 hrs). 60 Tablet 2    butalbital-acetaminophen-caffeine (FIORICET, ESGIC) -40 mg per tablet Take 1 Tablet by mouth every six (6) hours as needed (for pain or headache).  Not to exceed 4 tabs in 24 hrs 40 Tablet 1    ondansetron (ZOFRAN ODT) 8 mg disintegrating tablet Take 1 Tablet by mouth every twelve (12) hours as needed for Nausea. 20 Tablet 0    rosuvastatin (CRESTOR) 5 mg tablet Take 1 Tablet by mouth nightly. 90 Tablet 3    montelukast (SINGULAIR) 10 mg tablet Take 1 Tablet by mouth nightly. 90 Tablet 3    diphenhydrAMINE (Benadryl Allergy) 25 mg tablet Take 1 Tablet by mouth every six (6) hours as needed (allergies). 30 Tablet 3    fluticasone propion-salmeteroL (ADVAIR/WIXELA) 250-50 mcg/dose diskus inhaler Take 1 Puff by inhalation every twelve (12) hours. 3 Each 3    albuterol (PROVENTIL HFA, VENTOLIN HFA, PROAIR HFA) 90 mcg/actuation inhaler Take 2 Puffs by inhalation every four (4) hours as needed for Wheezing or Shortness of Breath. 1 Each 6    albuterol (PROVENTIL VENTOLIN) 2.5 mg /3 mL (0.083 %) nebu 3 mL by Nebulization route every four (4) hours as needed for Wheezing or Shortness of Breath. 2 Each 3    ibuprofen (MOTRIN) 800 mg tablet Take 1 Tablet by mouth every eight (8) hours as needed for Pain (take with food). 60 Tablet 2    omeprazole (PRILOSEC) 20 mg capsule Take 1 Capsule by mouth daily. 90 Capsule 1    polyethylene glycol (MIRALAX) 17 gram/dose powder Take 17 g by mouth daily. 1521 g 1    naproxen (NAPROSYN) 500 mg tablet Take 1 Tablet by mouth two (2) times daily as needed for Pain. 60 Tablet 1    amitriptyline (ELAVIL) 25 mg tablet Take 1 tab po QHS, may increase to 2 tabs po QHS      naratriptan (AMERGE) 2.5 mg tab       erenumab-aooe (Aimovig Autoinjector) 140 mg/mL injection 140 mg by SubCUTAneous route.      loratadine (CLARITIN REDITABS) 10 mg dissolvable tablet Take 1 Tab by mouth daily. 30 Tab 2    Nebulizer & Compressor machine 1 Each by Does Not Apply route every four (4) hours as needed. 1 Each 0    iron,carbonyl-vitamin C (Vitron-C) 65 mg iron- 125 mg TbEC Take 1 Tablet by mouth daily. (Patient not taking: Reported on 1/12/2023) 90 Tablet 3    diclofenac EC (VOLTAREN) 75 mg EC tablet Take 1 Tablet by mouth two (2) times daily (with meals).  (Patient not taking: Reported on 1/12/2023) 60 Tablet 0    meloxicam (MOBIC) 7.5 mg tablet Take 1 Tablet by mouth in the morning. (Patient not taking: Reported on 1/12/2023) 30 Tablet 1    hydrocortisone-pramoxine (PROCTOFOAM HC) rectal foam Insert 1 Applicator into rectum two (2) times a day. (Patient not taking: No sig reported) 1 Each 0    oxyCODONE-acetaminophen (PERCOCET) 5-325 mg per tablet TAKE 1 TABLET BY MOUTH EVERY 8 HOURS AS NEEDED FOR 7 DAYS (Patient not taking: No sig reported)      cloNIDine (CATAPRES) 0.1 mg/24 hr ptwk 1 Patch by TransDERmal route every seven (7) days. (Patient not taking: Reported on 1/12/2023) 12 Patch 2    Transparent Dressings (Tegaderm Frame Style) 2 3/8 X 2 3/4 \" bndg To use with clonidine patch weekly (Patient not taking: No sig reported) 20 Each 1    ospemifene (OSPHENA) 60 mg tab tablet Take 1 Tab by mouth daily. (Patient not taking: No sig reported) 90 Tab 3     Allergies   Allergen Reactions    Minong Anaphylaxis    Cherry Anaphylaxis    Methocarbamol Nausea and Vomiting    Asa-Acetaminophen-Caff-Potass Hives     Just allergic to aspirin only per Pt. Aspirin Unknown (comments)     Reaction(s): Unknown; Note: [id number] [phone]      [place] Other (comments)     syncope    [place] Concentrate [Flavoring Agent] Nausea and Vomiting    Zyrtec [Cetirizine] Other (comments)     Dizziness, blurred vision and disoriented     Past Medical History:   Diagnosis Date    Allergic rhinitis 7/16/2010    Asthma     Chronic shoulder pain 7/16/2010    HTN (hypertension) 7/16/2010    Hyperlipemia 1/55/9932    Lichen planus 9/2/2772    Obesity 7/16/2010     Past Surgical History:   Procedure Laterality Date    HX HYSTERECTOMY  5/16/14    HX HYSTERECTOMY       Family History   Problem Relation Age of Onset    Hypertension Mother     Diabetes Mother     Hypertension Sister     Diabetes Maternal Grandmother     Cancer Maternal Aunt         breast at age 54    Cancer Other         lung ca in great grand ma.     No Known Problems Father      Social History     Tobacco Use    Smoking status: Never    Smokeless tobacco: Never   Substance Use Topics    Alcohol use: No     ROS    Objective:     Patient-Reported Vitals 1/11/2023   Patient-Reported Weight 172   Patient-Reported Pulse 89   Patient-Reported Temperature 79   Patient-Reported SpO2 100   Patient-Reported Systolic  460   Patient-Reported Diastolic 80      General: alert, cooperative, no distress   Mental  status: normal mood, behavior, speech, dress, motor activity, and thought processes, able to follow commands   HENT: NCAT   Neck: no visualized mass   Resp: no respiratory distress   Neuro: no gross deficits   Skin: no discoloration or lesions of concern on visible areas   Psychiatric: normal affect, consistent with stated mood, no evidence of hallucinations     Additional exam findings: We discussed the expected course, resolution and complications of the diagnosis(es) in detail. Medication risks, benefits, costs, interactions, and alternatives were discussed as indicated. I advised her to contact the office if her condition worsens, changes or fails to improve as anticipated. She expressed understanding with the diagnosis(es) and plan. Angelica Ruano, was evaluated through a synchronous (real-time) audio-video encounter. The patient (or guardian if applicable) is aware that this is a billable service, which includes applicable co-pays. This Virtual Visit was conducted with patient's (and/or legal guardian's) consent. The visit was conducted pursuant to the emergency declaration under the [address] [place] [address], [address] [place] [address] [facility] authority and the [employer] General Act. Patient identification was verified, and a caregiver was present when appropriate. The patient was located at: Home: [address] [place] [id number]  The provider was located at:  [facility] [phone] [address] [facility]  [address], NP

## 2023-01-12 NOTE — PROGRESS NOTES
Lissy Mondragon (: 1965) is a 59 y.o. female, established patient, here for evaluation of the following chief complaint(s):   Referral / Consult (Cardiology for Heart [facility])         Lissy Mondragon, was evaluated through a synchronous (real-time) audio-video encounter. The patient (or guardian if applicable) is aware that this is a billable service, which includes applicable co-pays. This Virtual Visit was conducted with patient's (and/or legal guardian's) consent. The visit was conducted pursuant to the emergency declaration under the [address] [place] [address], [address] [place] [address] authority and the [employer] General Act. Patient identification was verified, and a caregiver was present when appropriate. The patient was located at: Home: [address] [place] [id number]  The provider was located at: Facility (Appt Department): [address]  [address]       An [address] [facility] was used to authenticate this note.   -- Florentin Vicente

## 2023-02-21 ENCOUNTER — PROCEDURE VISIT (OUTPATIENT)
Age: 58
End: 2023-02-21
Payer: OTHER GOVERNMENT

## 2023-02-21 VITALS — WEIGHT: 172 LBS | HEIGHT: 61 IN | BODY MASS INDEX: 32.47 KG/M2

## 2023-02-21 DIAGNOSIS — J45.909 UNCOMPLICATED ASTHMA, UNSPECIFIED ASTHMA SEVERITY, UNSPECIFIED WHETHER PERSISTENT: ICD-10-CM

## 2023-02-21 DIAGNOSIS — Z01.818 PRE-OP EXAM: Primary | ICD-10-CM

## 2023-02-21 PROCEDURE — 94010 BREATHING CAPACITY TEST: CPT | Performed by: INTERNAL MEDICINE

## 2023-03-15 ENCOUNTER — OFFICE VISIT (OUTPATIENT)
Age: 58
End: 2023-03-15
Payer: OTHER GOVERNMENT

## 2023-03-15 VITALS
OXYGEN SATURATION: 97 % | DIASTOLIC BLOOD PRESSURE: 71 MMHG | SYSTOLIC BLOOD PRESSURE: 107 MMHG | BODY MASS INDEX: 33.23 KG/M2 | HEIGHT: 61 IN | HEART RATE: 88 BPM | RESPIRATION RATE: 18 BRPM | TEMPERATURE: 98 F | WEIGHT: 176 LBS

## 2023-03-15 DIAGNOSIS — Z01.818 PRE-OP EVALUATION: ICD-10-CM

## 2023-03-15 DIAGNOSIS — Z72.89 VAPES NON-NICOTINE CONTAINING SUBSTANCE: ICD-10-CM

## 2023-03-15 DIAGNOSIS — J30.1 SEASONAL ALLERGIC RHINITIS DUE TO POLLEN: ICD-10-CM

## 2023-03-15 DIAGNOSIS — J45.20 MILD INTERMITTENT ASTHMA WITHOUT COMPLICATION: Primary | ICD-10-CM

## 2023-03-15 PROCEDURE — 3074F SYST BP LT 130 MM HG: CPT | Performed by: INTERNAL MEDICINE

## 2023-03-15 PROCEDURE — 99203 OFFICE O/P NEW LOW 30 MIN: CPT | Performed by: INTERNAL MEDICINE

## 2023-03-15 PROCEDURE — 3078F DIAST BP <80 MM HG: CPT | Performed by: INTERNAL MEDICINE

## 2023-03-15 ASSESSMENT — PATIENT HEALTH QUESTIONNAIRE - PHQ9
SUM OF ALL RESPONSES TO PHQ QUESTIONS 1-9: 0
1. LITTLE INTEREST OR PLEASURE IN DOING THINGS: 0
2. FEELING DOWN, DEPRESSED OR HOPELESS: 0
SUM OF ALL RESPONSES TO PHQ QUESTIONS 1-9: 0
SUM OF ALL RESPONSES TO PHQ9 QUESTIONS 1 & 2: 0

## 2023-03-15 NOTE — PATIENT INSTRUCTIONS
What is the plan?  -Continue Albuterol rescue inhaler only as needed for shortness of breath/cough    -Avoid using Florentin's rub in your nose.  -Stop use of all vape products.   -May start use of Netti pot.    -I agree with evaluation by Cardiology

## 2023-03-15 NOTE — PROGRESS NOTES
Grey Epstein presents today for   Chief Complaint   Patient presents with    Asthma       Is someone accompanying this pt? No    Is the patient using any DME equipment during OV? No    -DME Company No    Depression Screening:    PHQ-9 Questionaire 1/12/2023   Little interest or pleasure in doing things 0   Feeling down, depressed, or hopeless 0   PHQ-9 Total Score 0       Learning Assessment:    No flowsheet data found. Abuse Screening:    No flowsheet data found. Fall Risk    No flowsheet data found. Coordination of Care:    1. Have you been to the ER, urgent care clinic since your last visit? Hospitalized since your last visit? No    2. Have you seen or consulted any other health care providers outside of the [address] [place] [address] since your last visit? Include any pap smears or colon screening. No    Medication list has been update per patient.
(ZOFRAN-ODT) 8 MG TBDP disintegrating tablet Take 8 mg by mouth      Ospemifene 60 MG TABS Take 60 mg by mouth daily      oxyCODONE-acetaminophen (PERCOCET) 5-325 MG per tablet TAKE 1 TABLET BY MOUTH EVERY 8 HOURS AS NEEDED FOR 7 DAYS      polyethylene glycol (GLYCOLAX) 17 GM/SCOOP powder Take 17 g by mouth daily      rosuvastatin (CRESTOR) 5 MG tablet Take 5 mg by mouth       No current facility-administered medications for this visit. Review of Systems:  HEENT: No epistaxis, no nasal drainage, no difficulty in swallowing, no redness in eyes  Respiratory: as above  Cardiovascular: no chest pain, no palpitations, no chronic leg edema, no syncope  Gastrointestinal: no abd pain, no vomiting, no diarrhea, no bleeding symptoms  Genitourinary: No urinary symptoms or hematuria  Integument/breast: No ulcers or rashes  Musculoskeletal: Neg  Neurological: No focal weakness, no seizures, no headaches  Behvioral/Psych: No anxiety, no depression  Constitutional: No fever, no chills, no weight loss, no night sweats     Objective:   /71 (Site: Left Upper Arm, Position: Sitting, Cuff Size: Medium Adult)   Pulse 88   Temp 98 °F (36.7 °C) (Temporal)   Resp 18   Ht 5' 1\" (1.549 m)   Wt 176 lb (79.8 kg)   SpO2 97%   BMI 33.25 kg/m²      Physical Exam:   GENERAL: well developed and in  mild distress  HEENT:  PERRL, EOMI, no alar flaring or epistaxis, oral mucosa moist without cyanosis  NECK:  no jugular vein distention, no retractions, no thyromegaly or masses  LUNGS:  CTA, no tachypnea or associated muscle use  HEART:  Regular rate and rhythm no edema is present.  +Systolic murmur  ABDOMEN:  soft with no tenderness, bowel sounds present  EXTREMITIES:  warm with no cyanosis  SKIN:  no jaundice or ecchymosis  NEUROLOGIC:  alert and oriented, grossly non-focal    Data review:   Blood Eosinophils: n/a  IgE level: n/a  Allergy testing: n/a  [facility] allergen panel: n/a  Sputum eosinophils:

## 2023-03-29 ENCOUNTER — OFFICE VISIT (OUTPATIENT)
Age: 58
End: 2023-03-29
Payer: OTHER GOVERNMENT

## 2023-03-29 ENCOUNTER — HOSPITAL ENCOUNTER (OUTPATIENT)
Facility: HOSPITAL | Age: 58
Setting detail: SPECIMEN
Discharge: HOME | End: 2023-04-01
Payer: OTHER GOVERNMENT

## 2023-03-29 VITALS
OXYGEN SATURATION: 99 % | HEIGHT: 61 IN | HEART RATE: 82 BPM | SYSTOLIC BLOOD PRESSURE: 121 MMHG | RESPIRATION RATE: 18 BRPM | BODY MASS INDEX: 32.97 KG/M2 | WEIGHT: 174.6 LBS | TEMPERATURE: 97.4 F | DIASTOLIC BLOOD PRESSURE: 79 MMHG

## 2023-03-29 DIAGNOSIS — E78.2 MIXED HYPERLIPIDEMIA: ICD-10-CM

## 2023-03-29 DIAGNOSIS — R73.09 ELEVATED HEMOGLOBIN A1C: ICD-10-CM

## 2023-03-29 DIAGNOSIS — I10 ESSENTIAL (PRIMARY) HYPERTENSION: Primary | ICD-10-CM

## 2023-03-29 DIAGNOSIS — I10 ESSENTIAL (PRIMARY) HYPERTENSION: ICD-10-CM

## 2023-03-29 LAB
ALBUMIN SERPL-MCNC: 3.7 G/DL (ref 3.4–5)
ALBUMIN/GLOB SERPL: 1.1 (ref 0.8–1.7)
ALP SERPL-CCNC: 137 U/L (ref 45–117)
ALT SERPL-CCNC: 20 U/L (ref 13–56)
ANION GAP SERPL CALC-SCNC: 2 MMOL/L (ref 3–18)
AST SERPL-CCNC: 11 U/L (ref 10–38)
BILIRUB SERPL-MCNC: 0.5 MG/DL (ref 0.2–1)
BUN SERPL-MCNC: 11 MG/DL (ref 7–18)
BUN/CREAT SERPL: 15 (ref 12–20)
CALCIUM SERPL-MCNC: 9.3 MG/DL (ref 8.5–10.1)
CHLORIDE SERPL-SCNC: 107 MMOL/L (ref 100–111)
CHOLEST SERPL-MCNC: 204 MG/DL
CO2 SERPL-SCNC: 30 MMOL/L (ref 21–32)
CREAT SERPL-MCNC: 0.73 MG/DL (ref 0.6–1.3)
EST. AVERAGE GLUCOSE BLD GHB EST-MCNC: 105 MG/DL
GLOBULIN SER CALC-MCNC: 3.5 G/DL (ref 2–4)
GLUCOSE SERPL-MCNC: 84 MG/DL (ref 74–99)
HBA1C MFR BLD: 5.3 % (ref 4.2–5.6)
HDLC SERPL-MCNC: 56 MG/DL (ref 40–60)
HDLC SERPL: 3.6 (ref 0–5)
LDLC SERPL CALC-MCNC: 123.2 MG/DL (ref 0–100)
LIPID PANEL: ABNORMAL
POTASSIUM SERPL-SCNC: 5 MMOL/L (ref 3.5–5.5)
PROT SERPL-MCNC: 7.2 G/DL (ref 6.4–8.2)
SODIUM SERPL-SCNC: 139 MMOL/L (ref 136–145)
TRIGL SERPL-MCNC: 124 MG/DL
VLDLC SERPL CALC-MCNC: 24.8 MG/DL

## 2023-03-29 PROCEDURE — 83036 HEMOGLOBIN GLYCOSYLATED A1C: CPT

## 2023-03-29 PROCEDURE — 80061 LIPID PANEL: CPT

## 2023-03-29 PROCEDURE — 80053 COMPREHEN METABOLIC PANEL: CPT

## 2023-03-29 PROCEDURE — 36415 COLL VENOUS BLD VENIPUNCTURE: CPT

## 2023-03-29 RX ORDER — ONDANSETRON 8 MG/1
8 TABLET, ORALLY DISINTEGRATING ORAL EVERY 12 HOURS PRN
Qty: 30 TABLET | Refills: 2 | Status: SHIPPED | OUTPATIENT
Start: 2023-03-29

## 2023-03-29 RX ORDER — ROSUVASTATIN CALCIUM 5 MG/1
5 TABLET, COATED ORAL DAILY
Qty: 90 TABLET | Refills: 3 | Status: SHIPPED | OUTPATIENT
Start: 2023-03-29

## 2023-03-29 RX ORDER — POLYETHYLENE GLYCOL 3350 17 G/17G
17 POWDER, FOR SOLUTION ORAL DAILY
Qty: 90 EACH | Refills: 3 | Status: SHIPPED | OUTPATIENT
Start: 2023-03-29

## 2023-03-29 RX ORDER — PROMETHAZINE HYDROCHLORIDE 12.5 MG/1
TABLET ORAL
COMMUNITY
Start: 2023-02-20

## 2023-03-29 RX ORDER — NAPROXEN 500 MG/1
500 TABLET ORAL 2 TIMES DAILY PRN
Qty: 60 TABLET | Refills: 2 | Status: SHIPPED | OUTPATIENT
Start: 2023-03-29

## 2023-03-29 SDOH — ECONOMIC STABILITY: INCOME INSECURITY: HOW HARD IS IT FOR YOU TO PAY FOR THE VERY BASICS LIKE FOOD, HOUSING, MEDICAL CARE, AND HEATING?: NOT HARD AT ALL

## 2023-03-29 SDOH — ECONOMIC STABILITY: FOOD INSECURITY: WITHIN THE PAST 12 MONTHS, THE FOOD YOU BOUGHT JUST DIDN'T LAST AND YOU DIDN'T HAVE MONEY TO GET MORE.: NEVER TRUE

## 2023-03-29 SDOH — ECONOMIC STABILITY: HOUSING INSECURITY
IN THE LAST 12 MONTHS, WAS THERE A TIME WHEN YOU DID NOT HAVE A STEADY PLACE TO SLEEP OR SLEPT IN A SHELTER (INCLUDING NOW)?: NO

## 2023-03-29 SDOH — ECONOMIC STABILITY: FOOD INSECURITY: WITHIN THE PAST 12 MONTHS, YOU WORRIED THAT YOUR FOOD WOULD RUN OUT BEFORE YOU GOT MONEY TO BUY MORE.: NEVER TRUE

## 2023-03-29 ASSESSMENT — PATIENT HEALTH QUESTIONNAIRE - PHQ9
1. LITTLE INTEREST OR PLEASURE IN DOING THINGS: 0
SUM OF ALL RESPONSES TO PHQ9 QUESTIONS 1 & 2: 0
SUM OF ALL RESPONSES TO PHQ QUESTIONS 1-9: 0
2. FEELING DOWN, DEPRESSED OR HOPELESS: 0
SUM OF ALL RESPONSES TO PHQ QUESTIONS 1-9: 0

## 2023-03-29 NOTE — PROGRESS NOTES
1. \"Have you been to the ER, urgent care clinic since your last visit? Hospitalized since your last visit? \" No    2. \"Have you seen or consulted any other health care providers outside of the [address] [place] [address] since your last visit? \" No     3. For patients aged 39-70: Has the patient had a colonoscopy / FIT/ Cologuard? Yes - Care Gap present. Most recent result on file      If the patient is female:    4. For patients aged 41-77: Has the patient had a mammogram within the past 2 years? Yes - Care Gap present. Most recent result on file      5. For patients aged 21-65: Has the patient had a pap smear?  No
(PROCTOFOAM-HC) 1-1 % rectal foam Place 1 applicator rectally 2 times daily  Patient not taking: Reported on 3/29/2023 5/6/22   Ar Automatic Reconciliation   Iron-Vitamin C (VITRON-C)  MG TABS Take 1 tablet by mouth daily  Patient not taking: Reported on 3/29/2023 12/15/22   Ar Automatic Reconciliation   meloxicam (MOBIC) 7.5 MG tablet Take 7.5 mg by mouth daily  Patient not taking: Reported on 3/29/2023 8/8/22   Ar Automatic Reconciliation   omeprazole (PRILOSEC) 20 MG delayed release capsule Take 20 mg by mouth daily  Patient not taking: Reported on 3/29/2023 10/28/22   Ar Automatic Reconciliation   oxyCODONE-acetaminophen (PERCOCET) 5-325 MG per tablet TAKE 1 TABLET BY MOUTH EVERY 8 HOURS AS NEEDED FOR 7 DAYS  Patient not taking: Reported on 3/29/2023 4/12/21   Ar Automatic Reconciliation         The following sections were reviewed & updated as appropriate: PMH, PSH, FH, and SH. Review of Systems       Objective:   /79 (Site: Left Upper Arm, Position: Sitting, Cuff Size: Small Adult)   Pulse 82   Temp 97.4 °F (36.3 °C) (Temporal)   Resp 18   Ht 5' 1\" (1.549 m)   Wt 174 lb 9.6 oz (79.2 kg)   SpO2 99%   BMI 32.99 kg/m²    Physical Exam      Disclaimer: The patient understands our medical plan. Alternatives have been explained and offered. The risks, benefits and significant side effects of all medications have been reviewed. Anticipated time course and progression of condition reviewed. All questions have been addressed. She is encouraged to employ the information provided in the after visit summary, which was reviewed. Where applicable, she is instructed to call the clinic if she has not been notified either by phone or through [address] with the results of her tests or with an appointment plan for any referrals within 1 week(s). No news is not good news; it's no news.  The patient  is to call if her condition worsens or fails to improve or if significant side effects are

## 2023-04-20 RX ORDER — ALBUTEROL SULFATE 90 UG/1
2 AEROSOL, METERED RESPIRATORY (INHALATION) EVERY 4 HOURS PRN
Qty: 18 G | Refills: 5 | Status: SHIPPED | OUTPATIENT
Start: 2023-04-20

## 2023-04-20 RX ORDER — ALBUTEROL SULFATE 2.5 MG/3ML
2.5 SOLUTION RESPIRATORY (INHALATION) EVERY 4 HOURS PRN
Qty: 120 EACH | Refills: 5 | Status: SHIPPED | OUTPATIENT
Start: 2023-04-20

## 2023-04-20 ASSESSMENT — ENCOUNTER SYMPTOMS
CHEST TIGHTNESS: 0
SHORTNESS OF BREATH: 0

## 2023-05-30 ENCOUNTER — TRANSCRIBE ORDERS (OUTPATIENT)
Facility: HOSPITAL | Age: 58
End: 2023-05-30

## 2023-05-30 DIAGNOSIS — Z12.31 VISIT FOR SCREENING MAMMOGRAM: Primary | ICD-10-CM

## 2023-06-09 ENCOUNTER — OFFICE VISIT (OUTPATIENT)
Age: 58
End: 2023-06-09
Payer: OTHER GOVERNMENT

## 2023-06-09 VITALS
OXYGEN SATURATION: 97 % | HEART RATE: 86 BPM | HEIGHT: 61 IN | WEIGHT: 173.6 LBS | SYSTOLIC BLOOD PRESSURE: 129 MMHG | DIASTOLIC BLOOD PRESSURE: 87 MMHG | BODY MASS INDEX: 32.77 KG/M2 | TEMPERATURE: 97.3 F | RESPIRATION RATE: 16 BRPM

## 2023-06-09 DIAGNOSIS — R45.89 FEELING OF SADNESS: Primary | ICD-10-CM

## 2023-06-09 DIAGNOSIS — E78.2 MIXED HYPERLIPIDEMIA: ICD-10-CM

## 2023-06-09 DIAGNOSIS — I10 ESSENTIAL (PRIMARY) HYPERTENSION: ICD-10-CM

## 2023-06-09 DIAGNOSIS — R73.09 ELEVATED HEMOGLOBIN A1C: ICD-10-CM

## 2023-06-09 PROCEDURE — 99214 OFFICE O/P EST MOD 30 MIN: CPT | Performed by: NURSE PRACTITIONER

## 2023-06-09 PROCEDURE — 3078F DIAST BP <80 MM HG: CPT | Performed by: NURSE PRACTITIONER

## 2023-06-09 PROCEDURE — 3074F SYST BP LT 130 MM HG: CPT | Performed by: NURSE PRACTITIONER

## 2023-06-09 PROCEDURE — 99211 OFF/OP EST MAY X REQ PHY/QHP: CPT

## 2023-06-09 RX ORDER — EPINEPHRINE 0.3 MG/.3ML
0.3 INJECTION SUBCUTANEOUS ONCE
Qty: 0.6 ML | Refills: 0 | Status: SHIPPED | OUTPATIENT
Start: 2023-06-09 | End: 2023-06-09

## 2023-06-09 ASSESSMENT — PATIENT HEALTH QUESTIONNAIRE - PHQ9
SUM OF ALL RESPONSES TO PHQ QUESTIONS 1-9: 1
SUM OF ALL RESPONSES TO PHQ9 QUESTIONS 1 & 2: 1
2. FEELING DOWN, DEPRESSED OR HOPELESS: 1
SUM OF ALL RESPONSES TO PHQ QUESTIONS 1-9: 1
1. LITTLE INTEREST OR PLEASURE IN DOING THINGS: 0

## 2023-06-09 ASSESSMENT — ENCOUNTER SYMPTOMS
CHEST TIGHTNESS: 0
SHORTNESS OF BREATH: 0

## 2023-06-09 ASSESSMENT — ANXIETY QUESTIONNAIRES
5. BEING SO RESTLESS THAT IT IS HARD TO SIT STILL: 3
IF YOU CHECKED OFF ANY PROBLEMS ON THIS QUESTIONNAIRE, HOW DIFFICULT HAVE THESE PROBLEMS MADE IT FOR YOU TO DO YOUR WORK, TAKE CARE OF THINGS AT HOME, OR GET ALONG WITH OTHER PEOPLE: NOT DIFFICULT AT ALL
6. BECOMING EASILY ANNOYED OR IRRITABLE: 3
7. FEELING AFRAID AS IF SOMETHING AWFUL MIGHT HAPPEN: 3
2. NOT BEING ABLE TO STOP OR CONTROL WORRYING: 0
1. FEELING NERVOUS, ANXIOUS, OR ON EDGE: 0
3. WORRYING TOO MUCH ABOUT DIFFERENT THINGS: 3
4. TROUBLE RELAXING: 3
GAD7 TOTAL SCORE: 15

## 2023-06-22 ENCOUNTER — HOSPITAL ENCOUNTER (OUTPATIENT)
Facility: HOSPITAL | Age: 58
Discharge: HOME | End: 2023-06-22
Payer: OTHER GOVERNMENT

## 2023-06-22 DIAGNOSIS — Z12.31 VISIT FOR SCREENING MAMMOGRAM: ICD-10-CM

## 2023-06-22 PROCEDURE — 77063 BREAST TOMOSYNTHESIS BI: CPT

## 2023-07-18 ENCOUNTER — TELEPHONE (OUTPATIENT)
Age: 58
End: 2023-07-18

## 2023-07-18 NOTE — TELEPHONE ENCOUNTER
Left patient a voice message to contact the office. Patient's referral was faxed to Referral faxed to DAVID Riley on 6/15/23. Patient may call [phone] to schedule appointment.

## 2023-07-18 NOTE — TELEPHONE ENCOUNTER
Patient came in office to check status of referral for psychology requesting call back with update.  [phone]

## 2023-08-09 ENCOUNTER — TELEPHONE (OUTPATIENT)
Age: 58
End: 2023-08-09

## 2023-08-09 NOTE — TELEPHONE ENCOUNTER
Psychiatrist   [facility]  [phone]  [address] [place] Black  [address] [place] [address]    Patient requesting a call back once information has been faxed so she can schedule an appt

## 2023-08-18 ENCOUNTER — TELEPHONE (OUTPATIENT)
Age: 58
End: 2023-08-18

## 2023-08-18 NOTE — TELEPHONE ENCOUNTER
Spoke with patient to inform her referral information was faxed to [facility]. I informed patient to contact their office and let them know information was sent over and we received a confirmation receipt at 11:40. I called [facility] and spoke with Gareth to see if they received patients information and was informed they did not. Patient's information was faxed to [phone]. I spoke with patient again to inform her that her information has been faxed to [facility] and patient stated she was speaking them on her other line. Patient was informed to let [facility] know they should receive her referral shortly. Patient verbalized understanding. NP Kandy Akbar has been notified.

## 2023-08-18 NOTE — TELEPHONE ENCOUNTER
[facility] and [facility] have not received referral adv pt that referral was faxed by nurse patient stated if she does not get this referral she is about to kill somebody or herself requesting call back.

## 2023-11-03 RX ORDER — NAPROXEN 500 MG/1
500 TABLET ORAL 2 TIMES DAILY PRN
Qty: 60 TABLET | Refills: 2 | OUTPATIENT
Start: 2023-11-03

## 2023-11-03 NOTE — TELEPHONE ENCOUNTER
Last Visit: 06- OV   Next Appointment: none  Previous Refill Encounter: 03- #60 tabs with 2 refills      Requested Prescriptions     Pending Prescriptions Disp Refills    naproxen (NAPROSYN) 500 MG tablet 60 tablet 2     Sig: Take 1 tablet by mouth 2 times daily as needed for Pain

## 2023-11-09 RX ORDER — NAPROXEN 500 MG/1
500 TABLET ORAL 2 TIMES DAILY PRN
Qty: 60 TABLET | Refills: 2 | Status: SHIPPED | OUTPATIENT
Start: 2023-11-09

## 2023-12-15 ENCOUNTER — TELEPHONE (OUTPATIENT)
Age: 58
End: 2023-12-15

## 2023-12-15 DIAGNOSIS — N62 HYPERTROPHY OF BREAST: Primary | ICD-10-CM

## 2023-12-15 RX ORDER — MONTELUKAST SODIUM 10 MG/1
10 TABLET ORAL NIGHTLY
Qty: 100 TABLET | Refills: 3 | Status: SHIPPED | OUTPATIENT
Start: 2023-12-15

## 2023-12-15 NOTE — TELEPHONE ENCOUNTER
Patient requesting medication refill     montelukast (SINGULAIR) 10 MG tablet     [facility] [place], [place] - [address] [facility] [facility] [address]  Phone: [phone]  Fax: [phone]

## 2023-12-15 NOTE — TELEPHONE ENCOUNTER
Patient called stating she needs updated referral for breast reduction states office will not see her until referral has been updated appt 01/25/24.     Mandy Ruiz MD  [address]  [facility] [address]  Phone [phone]  Fax [phone]

## 2024-01-05 RX ORDER — NAPROXEN 500 MG/1
500 TABLET ORAL 2 TIMES DAILY PRN
Qty: 60 TABLET | Refills: 2 | OUTPATIENT
Start: 2024-01-05

## 2024-01-05 NOTE — TELEPHONE ENCOUNTER
Last Visit: 06- OV   Next Appointment: none      Requested Prescriptions     Pending Prescriptions Disp Refills    polyethylene glycol (GLYCOLAX) 17 GM/SCOOP powder 90 each 3     Sig: Take 17 g by mouth daily    EPINEPHrine (EPIPEN 2-HAKEEM) 0.3 MG/0.3ML SOAJ injection 0.6 mL 0     Sig: Inject 0.3 mLs into the muscle once for 1 dose Use as directed for allergic reaction

## 2024-01-06 ENCOUNTER — PATIENT MESSAGE (OUTPATIENT)
Age: 59
End: 2024-01-06

## 2024-01-08 ENCOUNTER — TELEPHONE (OUTPATIENT)
Age: 59
End: 2024-01-08

## 2024-01-08 RX ORDER — POLYETHYLENE GLYCOL 3350 17 G/17G
17 POWDER, FOR SOLUTION ORAL DAILY
Qty: 90 EACH | Refills: 3 | Status: SHIPPED | OUTPATIENT
Start: 2024-01-08

## 2024-01-08 RX ORDER — EPINEPHRINE 0.3 MG/.3ML
0.3 INJECTION SUBCUTANEOUS ONCE
Qty: 0.6 ML | Refills: 0 | Status: SHIPPED | OUTPATIENT
Start: 2024-01-08 | End: 2024-01-08

## 2024-01-08 RX ORDER — NAPROXEN 500 MG/1
500 TABLET ORAL 2 TIMES DAILY PRN
Qty: 60 TABLET | Refills: 2 | Status: SHIPPED | OUTPATIENT
Start: 2024-01-08

## 2024-01-08 NOTE — TELEPHONE ENCOUNTER
Last Appointment- 6/9/23    Next Appointment- None    Previous Refill Encounter(s): Date: 11/9/23 #30 Refills 2    Requested Prescriptions     Pending Prescriptions Disp Refills    naproxen (NAPROSYN) 500 MG tablet 60 tablet 2     Sig: Take 1 tablet by mouth 2 times daily as needed for Pain

## 2024-01-08 NOTE — TELEPHONE ENCOUNTER
From: Angélica Reina  To: Ashia Felton  Sent: 1/6/2024 7:35 AM EST  Subject: Naproxen     I went to the [facility] last month for a pain in my left leg and they told a X-ray of my knee and said I had arthritis in my joint but the pain goes up my leg it’s like it’s pushing on my nerve up and down my leg so that’s why I’m taking it to reduce the swelling . I’m not giving up my treadmill I had to stop for a while now I’m gaining weight again so can I please have some refills and thank you

## 2024-01-09 NOTE — TELEPHONE ENCOUNTER
Spoke with patient to inform her DAVID Felton was out sick and on vacation and has returned. Patient was informed her referral is in DAVID Felton box pending her signature. Patient verbalized understanding.

## 2024-01-31 ENCOUNTER — TELEPHONE (OUTPATIENT)
Age: 59
End: 2024-01-31

## 2024-01-31 NOTE — TELEPHONE ENCOUNTER
A [facility] message has been sent to the patient with prescription information from March 2023 and January 2024.

## 2024-01-31 NOTE — TELEPHONE ENCOUNTER
Patient came in office stating when she went to  medication (polyethylene glycol (GLYCOLAX) 17 GM/SCOOP powder)  from pharmacy she only received 1 bottle states pharmacy usually gives her 6 bottles advised patient provider wrote prescription with 3 refills and need to call pharmacy when she needs a refill states she wants to know who drop the ball in writing her prescription. Requesting nurse to call pharmacy for clarification on medication and how many bottles she is suppose to receive.      [facility] - [place], [place] - [address] - P [phone] - F [phone]  [address] [facility] [address]  Phone: [phone]  Fax: [phone]

## 2024-03-27 NOTE — TELEPHONE ENCOUNTER
Last Appointment- 6/9/23    Next Appointment- 4/5/24    Previous Refill Encounter(s) Zofran: Date: 3/29/23 #30 Refills 2  Previous Refill Encounter(s) Flovent: Date: 4/20/23 #3 Refills 3  Previous Refill Encounter(s) Albuterol: Date: 4/20/23 #18g Refills 5  Previous Refill Encounter(s) Albuterol Solution: Date: 4/20/23 #120 Refills 5  Previous Refill Encounter(s) Naproxen: Date: 1/8/24 #60 Refills 2    Requested Prescriptions     Pending Prescriptions Disp Refills    ondansetron (ZOFRAN-ODT) 8 MG TBDP disintegrating tablet 30 tablet 2     Sig: Take 1 tablet by mouth every 12 hours as needed for Nausea or Vomiting    fluticasone propionate (FLOVENT DISKUS) 250 MCG/ACT inhaler 3 each 3     Sig: Inhale 1 puff into the lungs 2 times daily    albuterol sulfate HFA (PROVENTIL;VENTOLIN;PROAIR) 108 (90 Base) MCG/ACT inhaler 18 g 5     Sig: Inhale 2 puffs into the lungs every 4 hours as needed for Wheezing or Shortness of Breath    albuterol (PROVENTIL) (2.5 MG/3ML) 0.083% nebulizer solution 120 each 5     Sig: Take 3 mLs by nebulization every 4 hours as needed for Wheezing or Shortness of Breath    naproxen (NAPROSYN) 500 MG tablet 60 tablet 2     Sig: Take 1 tablet by mouth 2 times daily as needed for Pain

## 2024-04-03 RX ORDER — ONDANSETRON 8 MG/1
8 TABLET, ORALLY DISINTEGRATING ORAL EVERY 12 HOURS PRN
Qty: 30 TABLET | Refills: 2 | Status: SHIPPED | OUTPATIENT
Start: 2024-04-03

## 2024-04-03 RX ORDER — NAPROXEN 500 MG/1
500 TABLET ORAL 2 TIMES DAILY PRN
Qty: 60 TABLET | Refills: 2 | Status: SHIPPED | OUTPATIENT
Start: 2024-04-03

## 2024-04-03 RX ORDER — ALBUTEROL SULFATE 2.5 MG/3ML
2.5 SOLUTION RESPIRATORY (INHALATION) EVERY 4 HOURS PRN
Qty: 120 EACH | Refills: 5 | Status: SHIPPED | OUTPATIENT
Start: 2024-04-03

## 2024-04-03 RX ORDER — ALBUTEROL SULFATE 90 UG/1
2 AEROSOL, METERED RESPIRATORY (INHALATION) EVERY 4 HOURS PRN
Qty: 18 G | Refills: 5 | Status: SHIPPED | OUTPATIENT
Start: 2024-04-03

## 2024-06-14 NOTE — TELEPHONE ENCOUNTER
Last Appointment- 4/5/24    Next Appointment- None    Previous Refill Encounter(s): Date: 3/29/23 #90 Refills 3    Requested Prescriptions     Pending Prescriptions Disp Refills    rosuvastatin (CRESTOR) 5 MG tablet 90 tablet 3     Sig: Take 1 tablet by mouth daily

## 2024-06-24 RX ORDER — ROSUVASTATIN CALCIUM 5 MG/1
5 TABLET, COATED ORAL DAILY
Qty: 90 TABLET | Refills: 3 | Status: SHIPPED | OUTPATIENT
Start: 2024-06-24

## 2024-08-28 RX ORDER — NAPROXEN 500 MG/1
500 TABLET ORAL 2 TIMES DAILY PRN
Qty: 60 TABLET | Refills: 2 | Status: SHIPPED | OUTPATIENT
Start: 2024-08-28

## 2024-08-28 RX ORDER — ROSUVASTATIN CALCIUM 5 MG/1
5 TABLET, COATED ORAL DAILY
Qty: 90 TABLET | Refills: 3 | Status: SHIPPED | OUTPATIENT
Start: 2024-08-28

## 2024-11-25 NOTE — TELEPHONE ENCOUNTER
Last Appointment- 4/5/24    Next Appointment- None    Previous Refill Encounter(s) Montelukast: Date: 12/15/23 #100 Refills 3  Previous Refill Encounter(s) Ondansetron: Date: 4/3/24 #30 Refills 2  Previous Refill Encounter(s) Gylcolax: Date: 4/5/24 850g Refills 5    Requested Prescriptions     Pending Prescriptions Disp Refills    montelukast (SINGULAIR) 10 MG tablet 100 tablet 3     Sig: Take 1 tablet by mouth nightly    ondansetron (ZOFRAN-ODT) 8 MG TBDP disintegrating tablet 30 tablet 2     Sig: Take 1 tablet by mouth every 12 hours as needed for Nausea or Vomiting    polyethylene glycol (GLYCOLAX) 17 GM/SCOOP powder 850 g 5     Sig: Take 17 g by mouth daily

## 2024-11-27 RX ORDER — MONTELUKAST SODIUM 10 MG/1
10 TABLET ORAL NIGHTLY
Qty: 100 TABLET | Refills: 3 | Status: SHIPPED | OUTPATIENT
Start: 2024-11-27

## 2024-11-27 RX ORDER — ONDANSETRON 8 MG/1
8 TABLET, ORALLY DISINTEGRATING ORAL EVERY 12 HOURS PRN
Qty: 30 TABLET | Refills: 2 | Status: SHIPPED | OUTPATIENT
Start: 2024-11-27

## 2024-11-27 RX ORDER — POLYETHYLENE GLYCOL 3350 17 G/17G
17 POWDER, FOR SOLUTION ORAL DAILY
Qty: 850 G | Refills: 5 | Status: SHIPPED | OUTPATIENT
Start: 2024-11-27

## 2024-12-02 ENCOUNTER — HOSPITAL ENCOUNTER (OUTPATIENT)
Facility: HOSPITAL | Age: 59
Setting detail: SPECIMEN
Discharge: HOME | End: 2024-12-05
Payer: OTHER GOVERNMENT

## 2024-12-02 DIAGNOSIS — R73.09 ELEVATED HEMOGLOBIN A1C: ICD-10-CM

## 2024-12-02 DIAGNOSIS — E78.2 MIXED HYPERLIPIDEMIA: ICD-10-CM

## 2024-12-02 DIAGNOSIS — I10 ESSENTIAL (PRIMARY) HYPERTENSION: ICD-10-CM

## 2024-12-02 DIAGNOSIS — I10 ESSENTIAL (PRIMARY) HYPERTENSION: Primary | ICD-10-CM

## 2024-12-02 LAB
ALBUMIN SERPL-MCNC: 4.2 G/DL (ref 3.4–5)
ALBUMIN/GLOB SERPL: 1.4 (ref 0.8–1.7)
ALP SERPL-CCNC: 108 U/L (ref 45–117)
ALT SERPL-CCNC: 18 U/L (ref 13–56)
ANION GAP SERPL CALC-SCNC: 4 MMOL/L (ref 3–18)
AST SERPL-CCNC: 19 U/L (ref 10–38)
BILIRUB SERPL-MCNC: 0.6 MG/DL (ref 0.2–1)
BUN SERPL-MCNC: 18 MG/DL (ref 7–18)
BUN/CREAT SERPL: 17 (ref 12–20)
CALCIUM SERPL-MCNC: 9.7 MG/DL (ref 8.5–10.1)
CHLORIDE SERPL-SCNC: 106 MMOL/L (ref 100–111)
CHOLEST SERPL-MCNC: 225 MG/DL
CO2 SERPL-SCNC: 27 MMOL/L (ref 21–32)
CREAT SERPL-MCNC: 1.08 MG/DL (ref 0.6–1.3)
EST. AVERAGE GLUCOSE BLD GHB EST-MCNC: 117 MG/DL
GLOBULIN SER CALC-MCNC: 3 G/DL (ref 2–4)
GLUCOSE SERPL-MCNC: 93 MG/DL (ref 74–99)
HBA1C MFR BLD: 5.7 % (ref 4.2–5.6)
HDLC SERPL-MCNC: 71 MG/DL (ref 40–60)
HDLC SERPL: 3.2 (ref 0–5)
LDLC SERPL CALC-MCNC: 141.4 MG/DL (ref 0–100)
LIPID PANEL: ABNORMAL
POTASSIUM SERPL-SCNC: 5 MMOL/L (ref 3.5–5.5)
PROT SERPL-MCNC: 7.2 G/DL (ref 6.4–8.2)
SODIUM SERPL-SCNC: 137 MMOL/L (ref 136–145)
TRIGL SERPL-MCNC: 63 MG/DL
VLDLC SERPL CALC-MCNC: 12.6 MG/DL

## 2024-12-02 PROCEDURE — 36415 COLL VENOUS BLD VENIPUNCTURE: CPT

## 2024-12-02 PROCEDURE — 80053 COMPREHEN METABOLIC PANEL: CPT

## 2024-12-02 PROCEDURE — 80061 LIPID PANEL: CPT

## 2024-12-02 PROCEDURE — 83036 HEMOGLOBIN GLYCOSYLATED A1C: CPT

## 2024-12-06 ENCOUNTER — OFFICE VISIT (OUTPATIENT)
Facility: CLINIC | Age: 59
End: 2024-12-06
Payer: OTHER GOVERNMENT

## 2024-12-06 VITALS
HEIGHT: 61 IN | WEIGHT: 174.6 LBS | DIASTOLIC BLOOD PRESSURE: 70 MMHG | BODY MASS INDEX: 32.97 KG/M2 | TEMPERATURE: 97.9 F | SYSTOLIC BLOOD PRESSURE: 111 MMHG | RESPIRATION RATE: 18 BRPM | OXYGEN SATURATION: 100 % | HEART RATE: 87 BPM

## 2024-12-06 DIAGNOSIS — R73.09 ELEVATED HEMOGLOBIN A1C: ICD-10-CM

## 2024-12-06 DIAGNOSIS — E78.00 PURE HYPERCHOLESTEROLEMIA: ICD-10-CM

## 2024-12-06 DIAGNOSIS — R92.323 SCATTERED FIBROGLANDULAR TISSUE DENSITY OF BOTH BREASTS ON MAMMOGRAPHY: ICD-10-CM

## 2024-12-06 DIAGNOSIS — N63.15 MASS OVERLAPPING MULTIPLE QUADRANTS OF RIGHT BREAST: ICD-10-CM

## 2024-12-06 DIAGNOSIS — I10 PRIMARY HYPERTENSION: Primary | ICD-10-CM

## 2024-12-06 DIAGNOSIS — Z12.31 SCREENING MAMMOGRAM FOR BREAST CANCER: ICD-10-CM

## 2024-12-06 PROCEDURE — 3074F SYST BP LT 130 MM HG: CPT | Performed by: NURSE PRACTITIONER

## 2024-12-06 PROCEDURE — 3078F DIAST BP <80 MM HG: CPT | Performed by: NURSE PRACTITIONER

## 2024-12-06 PROCEDURE — 99214 OFFICE O/P EST MOD 30 MIN: CPT | Performed by: NURSE PRACTITIONER

## 2024-12-06 NOTE — PROGRESS NOTES
Angélica Reina (:  1965) is a 59 y.o. female, Established patient, presents alone, here for evaluation of the following chief complaint(s):  Hypertension, Hyperlipidemia, and Prediabetes  History obtained from patient.  Assessment & Plan  1. Hyperlipidemia.  Her total cholesterol has increased from 204 to 225, likely due to recent dietary indulgences, including candies and ice cream. Her HDL cholesterol is at its highest at 71, which is beneficial for heart health, but her LDL cholesterol has also increased. She is advised to continue her current medication regimen and to reduce the intake of high-fat foods and sweets. Cholesterol levels will be rechecked in 6 months.    2. Hypertension.  No specific changes or concerns were discussed regarding her hypertension. She should continue her current medication regimen and lifestyle modifications.    3. Prediabetes.  Her A1c has slightly increased to 5.7, indicating a need for continued monitoring. She is advised to maintain a balanced diet and regular exercise. A recheck of her A1c levels is scheduled for 6 months from now.    4. Decreased GFR.  Her GFR has decreased by 2, currently at 58, which is slightly below the normal range of 60. She is advised to increase her water intake.    5. Post-surgical numbness.  She reports numbness and a mass in the right breast area following breast reduction surgery. There is no pain, but the area remains numb. A diagnostic mammogram and a right breast ultrasound have been ordered to evaluate the mass, which could potentially be scar tissue.    Follow-up  Return in 6 months for follow-up on cholesterol and A1c levels.    Results  Laboratory Studies  Total cholesterol increased from 204 to 225. Good cholesterol (HDL) is 71. LDL cholesterol increased. GFR minimally decreased to 58. A1c increased to 5.7.  Angélica was seen today for hypertension, hyperlipidemia and prediabetes.    Diagnoses and all orders for this

## 2024-12-10 NOTE — TELEPHONE ENCOUNTER
Zofran and Polyethylene Glycol Duplicate Request    Last Appointment- 12/6/24    Next Appointment- None    Previous Refill Encounter(s): Date: 8/28/24 #60 Refills 2    Requested Prescriptions     Pending Prescriptions Disp Refills    naproxen (NAPROSYN) 500 MG tablet 60 tablet 2     Sig: Take 1 tablet by mouth 2 times daily as needed for Pain    ondansetron (ZOFRAN-ODT) 8 MG TBDP disintegrating tablet 30 tablet 2     Sig: Take 1 tablet by mouth every 12 hours as needed for Nausea or Vomiting    polyethylene glycol (GLYCOLAX) 17 GM/SCOOP powder 850 g 5     Sig: Take 17 g by mouth daily

## 2024-12-24 RX ORDER — NAPROXEN 500 MG/1
500 TABLET ORAL 2 TIMES DAILY PRN
Qty: 60 TABLET | Refills: 2 | Status: SHIPPED | OUTPATIENT
Start: 2024-12-24

## 2024-12-24 RX ORDER — ONDANSETRON 8 MG/1
8 TABLET, ORALLY DISINTEGRATING ORAL EVERY 12 HOURS PRN
Qty: 30 TABLET | Refills: 2 | Status: SHIPPED | OUTPATIENT
Start: 2024-12-24

## 2024-12-24 RX ORDER — POLYETHYLENE GLYCOL 3350 17 G/17G
17 POWDER, FOR SOLUTION ORAL DAILY
Qty: 850 G | Refills: 5 | Status: SHIPPED | OUTPATIENT
Start: 2024-12-24

## 2025-03-05 ENCOUNTER — OFFICE VISIT (OUTPATIENT)
Facility: CLINIC | Age: 60
End: 2025-03-05
Payer: OTHER GOVERNMENT

## 2025-03-05 VITALS
SYSTOLIC BLOOD PRESSURE: 116 MMHG | HEART RATE: 81 BPM | DIASTOLIC BLOOD PRESSURE: 82 MMHG | OXYGEN SATURATION: 98 % | WEIGHT: 176.8 LBS | BODY MASS INDEX: 33.38 KG/M2 | TEMPERATURE: 97.7 F | RESPIRATION RATE: 18 BRPM | HEIGHT: 61 IN

## 2025-03-05 DIAGNOSIS — R14.0 ABDOMINAL BLOATING: Primary | ICD-10-CM

## 2025-03-05 DIAGNOSIS — R23.3 PETECHIAE: ICD-10-CM

## 2025-03-05 PROCEDURE — 3079F DIAST BP 80-89 MM HG: CPT | Performed by: NURSE PRACTITIONER

## 2025-03-05 PROCEDURE — 99214 OFFICE O/P EST MOD 30 MIN: CPT | Performed by: NURSE PRACTITIONER

## 2025-03-05 PROCEDURE — 3074F SYST BP LT 130 MM HG: CPT | Performed by: NURSE PRACTITIONER

## 2025-03-05 SDOH — ECONOMIC STABILITY: FOOD INSECURITY: WITHIN THE PAST 12 MONTHS, YOU WORRIED THAT YOUR FOOD WOULD RUN OUT BEFORE YOU GOT MONEY TO BUY MORE.: NEVER TRUE

## 2025-03-05 SDOH — ECONOMIC STABILITY: FOOD INSECURITY: WITHIN THE PAST 12 MONTHS, THE FOOD YOU BOUGHT JUST DIDN'T LAST AND YOU DIDN'T HAVE MONEY TO GET MORE.: NEVER TRUE

## 2025-03-05 ASSESSMENT — PATIENT HEALTH QUESTIONNAIRE - PHQ9
SUM OF ALL RESPONSES TO PHQ QUESTIONS 1-9: 0
2. FEELING DOWN, DEPRESSED OR HOPELESS: NOT AT ALL
SUM OF ALL RESPONSES TO PHQ QUESTIONS 1-9: 0
1. LITTLE INTEREST OR PLEASURE IN DOING THINGS: NOT AT ALL

## 2025-03-05 NOTE — PROGRESS NOTES
Angélica Reina (:  1965) is a 59 y.o. female, Established patient, presents alone, here for evaluation of the following chief complaint(s):  Bloated (X's 2 months, patient states she has gas but not eating anything to cause bloating. Patient states she is taking her miralax and not having any trouble with bowel movement.) and Other (Patient states she has spots on forearm/hands and they sometimes turn blue )  History obtained from patient.    Assessment & Plan  1. Abdominal distension.  A referral to Dr. Pond, a gastroenterologist, will be initiated for further evaluation of her bloating. She is advised to continue taking omeprazole 40 mg first thing in the morning on an empty stomach to help neutralize stomach acid and potentially alleviate the bloating.    2. Erythematous macules on the hands.  The etiology of the red spots on her hands remains uncertain, with potential causes including vitamin deficiency or excess. Laboratory tests will be ordered to investigate the cause of the red spots on her hands. She will be notified of the results via [employer].    Results    Angélica was seen today for bloated and other.    Diagnoses and all orders for this visit:    Abdominal bloating  -     External Referral To Gastroenterology    Petechiae  -     Vitamin C; Future  -     MISCELLANEOUS SENDOUT Vitamin K; Future      lab results reviewed with patient  I have discussed the diagnosis with the patient and the intended plan as seen in the above orders.  The patient has received an after-visit summary and questions were answered concerning future plans.  I have discussed medication side effects and warnings with the patient as well. Patient agreeable with above plan and verbalizes understanding.    Return if symptoms worsen or fail to improve.      Subjective   History of Present Illness  The patient presents for evaluation of bloating and red spots on her hands.    She reports experiencing excessive

## 2025-07-23 SDOH — HEALTH STABILITY: PHYSICAL HEALTH: ON AVERAGE, HOW MANY MINUTES DO YOU ENGAGE IN EXERCISE AT THIS LEVEL?: 30 MIN

## 2025-07-23 SDOH — HEALTH STABILITY: PHYSICAL HEALTH: ON AVERAGE, HOW MANY DAYS PER WEEK DO YOU ENGAGE IN MODERATE TO STRENUOUS EXERCISE (LIKE A BRISK WALK)?: 6 DAYS

## 2025-07-25 ENCOUNTER — OFFICE VISIT (OUTPATIENT)
Facility: CLINIC | Age: 60
End: 2025-07-25
Payer: OTHER GOVERNMENT

## 2025-07-25 VITALS
SYSTOLIC BLOOD PRESSURE: 136 MMHG | HEART RATE: 75 BPM | WEIGHT: 187 LBS | HEIGHT: 61 IN | BODY MASS INDEX: 35.3 KG/M2 | DIASTOLIC BLOOD PRESSURE: 89 MMHG | RESPIRATION RATE: 18 BRPM | OXYGEN SATURATION: 95 %

## 2025-07-25 DIAGNOSIS — Z13.21 ENCOUNTER FOR VITAMIN DEFICIENCY SCREENING: ICD-10-CM

## 2025-07-25 DIAGNOSIS — Z13.29 SCREENING FOR THYROID DISORDER: ICD-10-CM

## 2025-07-25 DIAGNOSIS — E78.00 PURE HYPERCHOLESTEROLEMIA: ICD-10-CM

## 2025-07-25 DIAGNOSIS — R01.1 CARDIAC MURMUR: ICD-10-CM

## 2025-07-25 DIAGNOSIS — Z13.1 SCREENING FOR DIABETES MELLITUS: ICD-10-CM

## 2025-07-25 DIAGNOSIS — Z23 ENCOUNTER FOR IMMUNIZATION: ICD-10-CM

## 2025-07-25 DIAGNOSIS — G43.809 OTHER MIGRAINE WITHOUT STATUS MIGRAINOSUS, NOT INTRACTABLE: ICD-10-CM

## 2025-07-25 DIAGNOSIS — Z13.228 SCREENING FOR METABOLIC DISORDER: ICD-10-CM

## 2025-07-25 DIAGNOSIS — M25.552 LEFT HIP PAIN: Primary | ICD-10-CM

## 2025-07-25 DIAGNOSIS — J45.20 MILD INTERMITTENT ASTHMA, UNSPECIFIED WHETHER COMPLICATED: ICD-10-CM

## 2025-07-25 DIAGNOSIS — D50.9 IRON DEFICIENCY ANEMIA, UNSPECIFIED IRON DEFICIENCY ANEMIA TYPE: ICD-10-CM

## 2025-07-25 DIAGNOSIS — R00.2 PALPITATIONS: ICD-10-CM

## 2025-07-25 DIAGNOSIS — G47.9 SLEEP DISTURBANCES: ICD-10-CM

## 2025-07-25 DIAGNOSIS — J30.1 SEASONAL ALLERGIC RHINITIS DUE TO POLLEN: ICD-10-CM

## 2025-07-25 DIAGNOSIS — Z91.018 NUT ALLERGY: ICD-10-CM

## 2025-07-25 PROBLEM — G43.909 MIGRAINE: Status: ACTIVE | Noted: 2025-07-25

## 2025-07-25 PROCEDURE — 90677 PCV20 VACCINE IM: CPT | Performed by: NURSE PRACTITIONER

## 2025-07-25 PROCEDURE — 90471 IMMUNIZATION ADMIN: CPT | Performed by: NURSE PRACTITIONER

## 2025-07-25 PROCEDURE — 99214 OFFICE O/P EST MOD 30 MIN: CPT | Performed by: NURSE PRACTITIONER

## 2025-07-25 PROCEDURE — 3075F SYST BP GE 130 - 139MM HG: CPT | Performed by: NURSE PRACTITIONER

## 2025-07-25 PROCEDURE — 3079F DIAST BP 80-89 MM HG: CPT | Performed by: NURSE PRACTITIONER

## 2025-07-25 RX ORDER — EPINEPHRINE 0.3 MG/.3ML
0.3 INJECTION SUBCUTANEOUS ONCE
Qty: 0.6 ML | Refills: 0 | Status: SHIPPED | OUTPATIENT
Start: 2025-07-25 | End: 2025-07-25

## 2025-07-25 RX ORDER — MONTELUKAST SODIUM 10 MG/1
10 TABLET ORAL NIGHTLY
Qty: 100 TABLET | Refills: 3 | Status: SHIPPED | OUTPATIENT
Start: 2025-07-25

## 2025-07-25 RX ORDER — ROSUVASTATIN CALCIUM 5 MG/1
5 TABLET, COATED ORAL DAILY
Qty: 90 TABLET | Refills: 3 | Status: SHIPPED | OUTPATIENT
Start: 2025-07-25

## 2025-07-25 RX ORDER — ALBUTEROL SULFATE 90 UG/1
2 INHALANT RESPIRATORY (INHALATION) EVERY 4 HOURS PRN
Qty: 18 G | Refills: 5 | Status: SHIPPED | OUTPATIENT
Start: 2025-07-25

## 2025-07-25 RX ORDER — GALCANEZUMAB 120 MG/ML
120 INJECTION, SOLUTION SUBCUTANEOUS
COMMUNITY
Start: 2025-01-10

## 2025-07-25 RX ORDER — MECLIZINE HCL 12.5 MG 12.5 MG/1
TABLET ORAL
COMMUNITY
Start: 2025-07-13

## 2025-07-25 RX ORDER — SUMATRIPTAN 50 MG/1
50 TABLET, FILM COATED ORAL SEE ADMIN INSTRUCTIONS
COMMUNITY
Start: 2025-07-10

## 2025-07-25 NOTE — PROGRESS NOTES
General Office Visit Note    Assessment/Plan:   orders and follow up as documented in EMR    1. Left hip pain  -     [facility])  2. Other migraine without status migrainosus, not intractable  -     External Referral To Neurology  3. Iron deficiency anemia, unspecified iron deficiency anemia type  -     External Referral To Hematology Oncology  4. Nut allergy  -     EPINEPHrine (EPIPEN 2-HAKEEM) 0.3 MG/0.3ML SOAJ injection; Inject 0.3 mLs into the muscle once for 1 dose Use as directed for allergic reaction, Disp-0.6 mL, R-0Normal  5. Sleep disturbances  -     diphenhydrAMINE (SOMINEX) 25 MG tablet; Take 1 tablet by mouth every 6 hours as needed for Sleep, Disp-90 tablet, R-1Normal  6. Pure hypercholesterolemia  -     rosuvastatin (CRESTOR) 5 MG tablet; Take 1 tablet by mouth daily, Disp-90 tablet, R-3Normal  7. Seasonal allergic rhinitis due to pollen  -     montelukast (SINGULAIR) 10 MG tablet; Take 1 tablet by mouth nightly, Disp-100 tablet, R-3Normal  8. Mild intermittent asthma, unspecified whether complicated  -     albuterol sulfate HFA (PROVENTIL;VENTOLIN;PROAIR) 108 (90 Base) MCG/ACT inhaler; Inhale 2 puffs into the lungs every 4 hours as needed for Wheezing or Shortness of Breath, Disp-18 g, R-5Normal  -     fluticasone propionate (FLOVENT DISKUS) 250 MCG/ACT inhaler; Inhale 1 puff into the lungs 2 times daily, Disp-3 each, R-3Normal  -     montelukast (SINGULAIR) 10 MG tablet; Take 1 tablet by mouth nightly, Disp-100 tablet, R-3Normal  9. Cardiac murmur  -     External Referral To Cardiology  10. Palpitations  -     External Referral To Cardiology  11. Screening for metabolic disorder  12. Screening for thyroid disorder  -     TSH; Future  13. Encounter for vitamin deficiency screening  -     Vitamin D 25 Hydroxy; Future  14. Screening for diabetes mellitus  15. Encounter for immunization  -     Pneumococcal, PCV20,

## 2025-07-25 NOTE — PROGRESS NOTES
Have you been to the ER, urgent care clinic since your last visit?  Hospitalized since your last visit?   NO    Have you seen or consulted any other health care providers outside our system since your last visit?   NO    Have you had a mammogram?”   NO    Date of last Mammogram: 6/22/2023

## 2025-08-04 ENCOUNTER — TELEPHONE (OUTPATIENT)
Facility: CLINIC | Age: 60
End: 2025-08-04

## 2025-08-07 ENCOUNTER — OFFICE VISIT (OUTPATIENT)
Age: 60
End: 2025-08-07

## 2025-08-07 ENCOUNTER — HOSPITAL ENCOUNTER (OUTPATIENT)
Facility: HOSPITAL | Age: 60
Discharge: HOME | End: 2025-08-10

## 2025-08-07 VITALS — BODY MASS INDEX: 34.2 KG/M2 | WEIGHT: 181 LBS

## 2025-08-07 DIAGNOSIS — M99.08 RIB CAGE REGION SOMATIC DYSFUNCTION: ICD-10-CM

## 2025-08-07 DIAGNOSIS — M99.07 UPPER EXTREMITY SOMATIC DYSFUNCTION: ICD-10-CM

## 2025-08-07 DIAGNOSIS — M99.03 SOMATIC DYSFUNCTION OF LUMBAR REGION: ICD-10-CM

## 2025-08-07 DIAGNOSIS — M70.62 GREATER TROCHANTERIC BURSITIS, LEFT: Primary | ICD-10-CM

## 2025-08-07 DIAGNOSIS — M99.05 PELVIC REGION SOMATIC DYSFUNCTION: ICD-10-CM

## 2025-08-07 DIAGNOSIS — M99.01 CERVICAL SOMATIC DYSFUNCTION: ICD-10-CM

## 2025-08-07 DIAGNOSIS — M70.62 GREATER TROCHANTERIC BURSITIS, LEFT: ICD-10-CM

## 2025-08-07 DIAGNOSIS — M99.06 LOWER LIMB REGION SOMATIC DYSFUNCTION: ICD-10-CM

## 2025-08-07 DIAGNOSIS — M99.09 SOMATIC DYSFUNCTION OF ABDOMINAL REGION: ICD-10-CM

## 2025-08-07 DIAGNOSIS — M99.02 THORACIC REGION SOMATIC DYSFUNCTION: ICD-10-CM

## 2025-08-07 DIAGNOSIS — M99.04 SACRAL REGION SOMATIC DYSFUNCTION: ICD-10-CM

## 2025-08-07 RX ORDER — NAPROXEN 500 MG/1
500 TABLET ORAL 2 TIMES DAILY WITH MEALS
Qty: 60 TABLET | Refills: 1 | Status: SHIPPED | OUTPATIENT
Start: 2025-08-07